# Patient Record
Sex: MALE | Race: BLACK OR AFRICAN AMERICAN | NOT HISPANIC OR LATINO | Employment: FULL TIME | URBAN - METROPOLITAN AREA
[De-identification: names, ages, dates, MRNs, and addresses within clinical notes are randomized per-mention and may not be internally consistent; named-entity substitution may affect disease eponyms.]

---

## 2022-01-01 ENCOUNTER — TELEPHONE (OUTPATIENT)
Dept: HEMATOLOGY/ONCOLOGY | Facility: CLINIC | Age: 62
End: 2022-01-01
Payer: MEDICAID

## 2022-01-01 ENCOUNTER — LAB VISIT (OUTPATIENT)
Dept: LAB | Facility: CLINIC | Age: 62
End: 2022-01-01
Payer: MEDICAID

## 2022-01-01 ENCOUNTER — OFFICE VISIT (OUTPATIENT)
Dept: HEMATOLOGY/ONCOLOGY | Facility: CLINIC | Age: 62
End: 2022-01-01
Payer: MEDICAID

## 2022-01-01 ENCOUNTER — INFUSION (OUTPATIENT)
Dept: INFUSION THERAPY | Facility: HOSPITAL | Age: 62
End: 2022-01-01
Attending: INTERNAL MEDICINE
Payer: MEDICAID

## 2022-01-01 ENCOUNTER — PATIENT MESSAGE (OUTPATIENT)
Dept: ADMINISTRATIVE | Facility: OTHER | Age: 62
End: 2022-01-01
Payer: MEDICAID

## 2022-01-01 ENCOUNTER — PATIENT MESSAGE (OUTPATIENT)
Dept: HEMATOLOGY/ONCOLOGY | Facility: CLINIC | Age: 62
End: 2022-01-01
Payer: MEDICAID

## 2022-01-01 ENCOUNTER — PATIENT MESSAGE (OUTPATIENT)
Dept: PALLIATIVE MEDICINE | Facility: CLINIC | Age: 62
End: 2022-01-01
Payer: MEDICAID

## 2022-01-01 ENCOUNTER — LAB VISIT (OUTPATIENT)
Dept: LAB | Facility: HOSPITAL | Age: 62
End: 2022-01-01
Attending: INTERNAL MEDICINE
Payer: MEDICAID

## 2022-01-01 ENCOUNTER — CLINICAL SUPPORT (OUTPATIENT)
Dept: HEMATOLOGY/ONCOLOGY | Facility: CLINIC | Age: 62
End: 2022-01-01
Payer: MEDICAID

## 2022-01-01 ENCOUNTER — OFFICE VISIT (OUTPATIENT)
Dept: PALLIATIVE MEDICINE | Facility: CLINIC | Age: 62
End: 2022-01-01
Payer: MEDICAID

## 2022-01-01 ENCOUNTER — DOCUMENTATION ONLY (OUTPATIENT)
Dept: HEMATOLOGY/ONCOLOGY | Facility: CLINIC | Age: 62
End: 2022-01-01
Payer: MEDICAID

## 2022-01-01 ENCOUNTER — LAB VISIT (OUTPATIENT)
Dept: LAB | Facility: HOSPITAL | Age: 62
End: 2022-01-01
Attending: NURSE PRACTITIONER
Payer: MEDICAID

## 2022-01-01 ENCOUNTER — TELEPHONE (OUTPATIENT)
Dept: INFUSION THERAPY | Facility: HOSPITAL | Age: 62
End: 2022-01-01
Payer: MEDICAID

## 2022-01-01 ENCOUNTER — DOCUMENTATION ONLY (OUTPATIENT)
Dept: INFUSION THERAPY | Facility: HOSPITAL | Age: 62
End: 2022-01-01
Payer: MEDICAID

## 2022-01-01 ENCOUNTER — HOSPITAL ENCOUNTER (OUTPATIENT)
Dept: RADIOLOGY | Facility: HOSPITAL | Age: 62
Discharge: HOME | End: 2022-11-18
Attending: INTERNAL MEDICINE
Payer: MEDICAID

## 2022-01-01 ENCOUNTER — TELEPHONE (OUTPATIENT)
Dept: ORTHOPEDICS | Facility: CLINIC | Age: 62
End: 2022-01-01
Payer: MEDICAID

## 2022-01-01 ENCOUNTER — DOCUMENTATION ONLY (OUTPATIENT)
Dept: INFUSION THERAPY | Facility: HOSPITAL | Age: 62
End: 2022-01-01

## 2022-01-01 ENCOUNTER — LAB VISIT (OUTPATIENT)
Dept: LAB | Facility: CLINIC | Age: 62
End: 2022-01-01
Attending: INTERNAL MEDICINE
Payer: MEDICAID

## 2022-01-01 ENCOUNTER — CONFERENCE (OUTPATIENT)
Dept: TRANSPLANT | Facility: CLINIC | Age: 62
End: 2022-01-01
Payer: MEDICAID

## 2022-01-01 ENCOUNTER — TELEPHONE (OUTPATIENT)
Dept: HEPATOLOGY | Facility: HOSPITAL | Age: 62
End: 2022-01-01
Payer: MEDICAID

## 2022-01-01 ENCOUNTER — SPECIALTY PHARMACY (OUTPATIENT)
Dept: PHARMACY | Facility: CLINIC | Age: 62
End: 2022-01-01
Payer: MEDICAID

## 2022-01-01 ENCOUNTER — HOSPITAL ENCOUNTER (OUTPATIENT)
Facility: HOSPITAL | Age: 62
Discharge: HOME | End: 2022-06-23
Attending: HOSPITALIST | Admitting: HOSPITALIST
Payer: MEDICAID

## 2022-01-01 ENCOUNTER — HOSPITAL ENCOUNTER (OUTPATIENT)
Dept: RADIOLOGY | Facility: HOSPITAL | Age: 62
Discharge: HOME | End: 2022-09-21
Attending: INTERNAL MEDICINE
Payer: MEDICAID

## 2022-01-01 VITALS
TEMPERATURE: 98 F | BODY MASS INDEX: 25.11 KG/M2 | WEIGHT: 165.13 LBS | RESPIRATION RATE: 16 BRPM | SYSTOLIC BLOOD PRESSURE: 117 MMHG | HEART RATE: 55 BPM | DIASTOLIC BLOOD PRESSURE: 74 MMHG

## 2022-01-01 VITALS
BODY MASS INDEX: 23.76 KG/M2 | RESPIRATION RATE: 18 BRPM | TEMPERATURE: 97 F | HEART RATE: 76 BPM | DIASTOLIC BLOOD PRESSURE: 87 MMHG | SYSTOLIC BLOOD PRESSURE: 129 MMHG | WEIGHT: 156.75 LBS | HEIGHT: 68 IN

## 2022-01-01 VITALS
HEIGHT: 68 IN | DIASTOLIC BLOOD PRESSURE: 70 MMHG | TEMPERATURE: 97 F | SYSTOLIC BLOOD PRESSURE: 115 MMHG | HEART RATE: 57 BPM | OXYGEN SATURATION: 97 % | RESPIRATION RATE: 18 BRPM | BODY MASS INDEX: 23.45 KG/M2 | WEIGHT: 154.75 LBS

## 2022-01-01 VITALS
DIASTOLIC BLOOD PRESSURE: 75 MMHG | RESPIRATION RATE: 16 BRPM | TEMPERATURE: 98 F | HEART RATE: 78 BPM | SYSTOLIC BLOOD PRESSURE: 107 MMHG | WEIGHT: 166.13 LBS | BODY MASS INDEX: 25.18 KG/M2 | HEIGHT: 68 IN | OXYGEN SATURATION: 97 %

## 2022-01-01 VITALS
RESPIRATION RATE: 16 BRPM | BODY MASS INDEX: 25.26 KG/M2 | SYSTOLIC BLOOD PRESSURE: 117 MMHG | HEART RATE: 69 BPM | HEIGHT: 68 IN | TEMPERATURE: 98 F | WEIGHT: 166.69 LBS | DIASTOLIC BLOOD PRESSURE: 75 MMHG

## 2022-01-01 VITALS
TEMPERATURE: 97 F | DIASTOLIC BLOOD PRESSURE: 70 MMHG | BODY MASS INDEX: 24.79 KG/M2 | HEART RATE: 68 BPM | SYSTOLIC BLOOD PRESSURE: 116 MMHG | HEIGHT: 68 IN | WEIGHT: 163.56 LBS | OXYGEN SATURATION: 100 %

## 2022-01-01 VITALS
TEMPERATURE: 98 F | HEART RATE: 62 BPM | SYSTOLIC BLOOD PRESSURE: 122 MMHG | DIASTOLIC BLOOD PRESSURE: 88 MMHG | RESPIRATION RATE: 18 BRPM | WEIGHT: 160.94 LBS | BODY MASS INDEX: 24.39 KG/M2 | HEIGHT: 68 IN

## 2022-01-01 VITALS
TEMPERATURE: 97 F | OXYGEN SATURATION: 96 % | WEIGHT: 153.88 LBS | SYSTOLIC BLOOD PRESSURE: 103 MMHG | DIASTOLIC BLOOD PRESSURE: 72 MMHG | BODY MASS INDEX: 23.32 KG/M2 | RESPIRATION RATE: 18 BRPM | HEIGHT: 68 IN | HEART RATE: 72 BPM

## 2022-01-01 VITALS
HEART RATE: 81 BPM | SYSTOLIC BLOOD PRESSURE: 129 MMHG | OXYGEN SATURATION: 98 % | BODY MASS INDEX: 23.83 KG/M2 | TEMPERATURE: 97 F | RESPIRATION RATE: 18 BRPM | DIASTOLIC BLOOD PRESSURE: 91 MMHG | WEIGHT: 156.75 LBS

## 2022-01-01 VITALS
HEART RATE: 72 BPM | TEMPERATURE: 97 F | WEIGHT: 160.94 LBS | DIASTOLIC BLOOD PRESSURE: 88 MMHG | HEIGHT: 68 IN | SYSTOLIC BLOOD PRESSURE: 118 MMHG | BODY MASS INDEX: 24.39 KG/M2 | OXYGEN SATURATION: 98 %

## 2022-01-01 VITALS
BODY MASS INDEX: 24.89 KG/M2 | HEART RATE: 70 BPM | DIASTOLIC BLOOD PRESSURE: 70 MMHG | HEIGHT: 68 IN | WEIGHT: 164.25 LBS | TEMPERATURE: 98 F | OXYGEN SATURATION: 96 % | SYSTOLIC BLOOD PRESSURE: 102 MMHG

## 2022-01-01 VITALS
TEMPERATURE: 97 F | SYSTOLIC BLOOD PRESSURE: 98 MMHG | HEIGHT: 68 IN | WEIGHT: 154.13 LBS | BODY MASS INDEX: 23.36 KG/M2 | OXYGEN SATURATION: 98 % | DIASTOLIC BLOOD PRESSURE: 70 MMHG | HEART RATE: 96 BPM

## 2022-01-01 VITALS
DIASTOLIC BLOOD PRESSURE: 70 MMHG | HEART RATE: 76 BPM | TEMPERATURE: 98 F | WEIGHT: 166.69 LBS | OXYGEN SATURATION: 98 % | SYSTOLIC BLOOD PRESSURE: 106 MMHG | HEIGHT: 68 IN | BODY MASS INDEX: 25.26 KG/M2

## 2022-01-01 VITALS
RESPIRATION RATE: 18 BRPM | SYSTOLIC BLOOD PRESSURE: 98 MMHG | HEIGHT: 68 IN | HEART RATE: 80 BPM | OXYGEN SATURATION: 96 % | WEIGHT: 159.63 LBS | TEMPERATURE: 97 F | DIASTOLIC BLOOD PRESSURE: 70 MMHG | BODY MASS INDEX: 24.19 KG/M2

## 2022-01-01 VITALS
HEIGHT: 68 IN | HEART RATE: 81 BPM | BODY MASS INDEX: 25.13 KG/M2 | WEIGHT: 165.81 LBS | OXYGEN SATURATION: 98 % | DIASTOLIC BLOOD PRESSURE: 84 MMHG | TEMPERATURE: 98 F | SYSTOLIC BLOOD PRESSURE: 120 MMHG

## 2022-01-01 VITALS
RESPIRATION RATE: 16 BRPM | TEMPERATURE: 97 F | BODY MASS INDEX: 23.45 KG/M2 | DIASTOLIC BLOOD PRESSURE: 70 MMHG | HEIGHT: 68 IN | WEIGHT: 154.75 LBS | OXYGEN SATURATION: 97 % | HEART RATE: 56 BPM | SYSTOLIC BLOOD PRESSURE: 125 MMHG

## 2022-01-01 VITALS
HEIGHT: 68 IN | WEIGHT: 153.88 LBS | DIASTOLIC BLOOD PRESSURE: 64 MMHG | HEART RATE: 66 BPM | TEMPERATURE: 97 F | BODY MASS INDEX: 23.32 KG/M2 | SYSTOLIC BLOOD PRESSURE: 110 MMHG | RESPIRATION RATE: 18 BRPM | OXYGEN SATURATION: 96 %

## 2022-01-01 VITALS
RESPIRATION RATE: 18 BRPM | DIASTOLIC BLOOD PRESSURE: 84 MMHG | OXYGEN SATURATION: 96 % | BODY MASS INDEX: 24.71 KG/M2 | HEIGHT: 68 IN | WEIGHT: 163 LBS | HEART RATE: 89 BPM | TEMPERATURE: 99 F | SYSTOLIC BLOOD PRESSURE: 125 MMHG

## 2022-01-01 DIAGNOSIS — M54.50 CHRONIC LEFT-SIDED LOW BACK PAIN WITHOUT SCIATICA: ICD-10-CM

## 2022-01-01 DIAGNOSIS — C22.0 HCC (HEPATOCELLULAR CARCINOMA): ICD-10-CM

## 2022-01-01 DIAGNOSIS — G89.3 CANCER RELATED PAIN: ICD-10-CM

## 2022-01-01 DIAGNOSIS — I10 HYPERTENSION, UNSPECIFIED TYPE: ICD-10-CM

## 2022-01-01 DIAGNOSIS — D69.6 THROMBOCYTOPENIA: ICD-10-CM

## 2022-01-01 DIAGNOSIS — R22.2 CHEST MASS: ICD-10-CM

## 2022-01-01 DIAGNOSIS — D64.9 NORMOCYTIC ANEMIA: ICD-10-CM

## 2022-01-01 DIAGNOSIS — B19.20 HEPATITIS C VIRUS INFECTION WITHOUT HEPATIC COMA, UNSPECIFIED CHRONICITY: ICD-10-CM

## 2022-01-01 DIAGNOSIS — R07.9 CHEST PAIN: ICD-10-CM

## 2022-01-01 DIAGNOSIS — G47.00 INSOMNIA, UNSPECIFIED TYPE: ICD-10-CM

## 2022-01-01 DIAGNOSIS — C22.0 HCC (HEPATOCELLULAR CARCINOMA): Primary | ICD-10-CM

## 2022-01-01 DIAGNOSIS — R16.0 LIVER MASS: Primary | ICD-10-CM

## 2022-01-01 DIAGNOSIS — K76.82 HEPATIC ENCEPHALOPATHY: ICD-10-CM

## 2022-01-01 DIAGNOSIS — R07.9 CHEST PAIN, UNSPECIFIED TYPE: ICD-10-CM

## 2022-01-01 DIAGNOSIS — R04.0 BLEEDING FROM THE NOSE: ICD-10-CM

## 2022-01-01 DIAGNOSIS — K76.82 HEPATIC ENCEPHALOPATHY: Primary | ICD-10-CM

## 2022-01-01 DIAGNOSIS — G89.29 CHRONIC LEFT-SIDED LOW BACK PAIN WITHOUT SCIATICA: ICD-10-CM

## 2022-01-01 DIAGNOSIS — I82.220: ICD-10-CM

## 2022-01-01 DIAGNOSIS — R25.1 TREMORS OF NERVOUS SYSTEM: ICD-10-CM

## 2022-01-01 DIAGNOSIS — M79.18 PIRIFORMIS MUSCLE PAIN: ICD-10-CM

## 2022-01-01 DIAGNOSIS — J45.20 MILD INTERMITTENT REACTIVE AIRWAY DISEASE WITHOUT COMPLICATION: ICD-10-CM

## 2022-01-01 DIAGNOSIS — Z71.89 ACP (ADVANCE CARE PLANNING): ICD-10-CM

## 2022-01-01 LAB
A1AT SERPL-MCNC: 200 MG/DL (ref 100–190)
AFP SERPL-MCNC: 8114 NG/ML (ref 0–8.4)
AFP SERPL-MCNC: 9243 NG/ML (ref 0–8.4)
AFP SERPL-MCNC: ABNORMAL NG/ML (ref 0–8.4)
AFP SERPL-MCNC: ABNORMAL NG/ML (ref 0–8.4)
ALBUMIN SERPL BCP-MCNC: 2.5 G/DL (ref 3.5–5.2)
ALBUMIN SERPL BCP-MCNC: 2.8 G/DL (ref 3.5–5.2)
ALBUMIN SERPL BCP-MCNC: 3 G/DL (ref 3.5–5.2)
ALBUMIN SERPL BCP-MCNC: 3.1 G/DL (ref 3.5–5.2)
ALBUMIN SERPL BCP-MCNC: 3.2 G/DL (ref 3.5–5.2)
ALBUMIN SERPL BCP-MCNC: 3.2 G/DL (ref 3.5–5.2)
ALBUMIN SERPL BCP-MCNC: 3.3 G/DL (ref 3.5–5.2)
ALBUMIN SERPL BCP-MCNC: 3.3 G/DL (ref 3.5–5.2)
ALP SERPL-CCNC: 105 U/L (ref 55–135)
ALP SERPL-CCNC: 119 U/L (ref 55–135)
ALP SERPL-CCNC: 120 U/L (ref 55–135)
ALP SERPL-CCNC: 120 U/L (ref 55–135)
ALP SERPL-CCNC: 124 U/L (ref 55–135)
ALP SERPL-CCNC: 127 U/L (ref 55–135)
ALP SERPL-CCNC: 131 U/L (ref 55–135)
ALP SERPL-CCNC: 131 U/L (ref 55–135)
ALP SERPL-CCNC: 192 U/L (ref 55–135)
ALP SERPL-CCNC: 82 U/L (ref 55–135)
ALP SERPL-CCNC: 82 U/L (ref 55–135)
ALT SERPL W/O P-5'-P-CCNC: 14 U/L (ref 10–44)
ALT SERPL W/O P-5'-P-CCNC: 18 U/L (ref 10–44)
ALT SERPL W/O P-5'-P-CCNC: 19 U/L (ref 10–44)
ALT SERPL W/O P-5'-P-CCNC: 31 U/L (ref 10–44)
ALT SERPL W/O P-5'-P-CCNC: 32 U/L (ref 10–44)
ALT SERPL W/O P-5'-P-CCNC: 35 U/L (ref 10–44)
ALT SERPL W/O P-5'-P-CCNC: 39 U/L (ref 10–44)
ALT SERPL W/O P-5'-P-CCNC: 49 U/L (ref 10–44)
ALT SERPL W/O P-5'-P-CCNC: 61 U/L (ref 10–44)
ALT SERPL W/O P-5'-P-CCNC: 64 U/L (ref 10–44)
ALT SERPL W/O P-5'-P-CCNC: 88 U/L (ref 10–44)
ANA SER QL IF: NORMAL
ANION GAP SERPL CALC-SCNC: 6 MMOL/L (ref 8–16)
ANION GAP SERPL CALC-SCNC: 7 MMOL/L (ref 8–16)
ANION GAP SERPL CALC-SCNC: 8 MMOL/L (ref 8–16)
ANION GAP SERPL CALC-SCNC: 9 MMOL/L (ref 8–16)
ASCENDING AORTA: 3 CM
AST SERPL-CCNC: 110 U/L (ref 10–40)
AST SERPL-CCNC: 111 U/L (ref 10–40)
AST SERPL-CCNC: 128 U/L (ref 10–40)
AST SERPL-CCNC: 143 U/L (ref 10–40)
AST SERPL-CCNC: 33 U/L (ref 10–40)
AST SERPL-CCNC: 33 U/L (ref 10–40)
AST SERPL-CCNC: 43 U/L (ref 10–40)
AST SERPL-CCNC: 74 U/L (ref 10–40)
AST SERPL-CCNC: 74 U/L (ref 10–40)
AST SERPL-CCNC: 83 U/L (ref 10–40)
AST SERPL-CCNC: 90 U/L (ref 10–40)
AV INDEX (PROSTH): 0.68
AV MEAN GRADIENT: 3 MMHG
AV PEAK GRADIENT: 4 MMHG
AV VALVE AREA: 2.36 CM2
AV VELOCITY RATIO: 0.81
BASOPHILS # BLD AUTO: 0.02 K/UL (ref 0–0.2)
BASOPHILS # BLD AUTO: 0.05 K/UL (ref 0–0.2)
BASOPHILS # BLD AUTO: 0.06 K/UL (ref 0–0.2)
BASOPHILS # BLD AUTO: 0.07 K/UL (ref 0–0.2)
BASOPHILS # BLD AUTO: 0.07 K/UL (ref 0–0.2)
BASOPHILS # BLD AUTO: 0.08 K/UL (ref 0–0.2)
BASOPHILS NFR BLD: 0.6 % (ref 0–1.9)
BASOPHILS NFR BLD: 0.8 % (ref 0–1.9)
BASOPHILS NFR BLD: 1.1 % (ref 0–1.9)
BASOPHILS NFR BLD: 1.4 % (ref 0–1.9)
BASOPHILS NFR BLD: 1.4 % (ref 0–1.9)
BASOPHILS NFR BLD: 1.5 % (ref 0–1.9)
BASOPHILS NFR BLD: 1.5 % (ref 0–1.9)
BASOPHILS NFR BLD: 1.6 % (ref 0–1.9)
BILIRUB SERPL-MCNC: 1 MG/DL (ref 0.1–1)
BILIRUB SERPL-MCNC: 1 MG/DL (ref 0.1–1)
BILIRUB SERPL-MCNC: 1.2 MG/DL (ref 0.1–1)
BILIRUB SERPL-MCNC: 1.3 MG/DL (ref 0.1–1)
BILIRUB SERPL-MCNC: 1.8 MG/DL (ref 0.1–1)
BILIRUB SERPL-MCNC: 2 MG/DL (ref 0.1–1)
BILIRUB SERPL-MCNC: 2.4 MG/DL (ref 0.1–1)
BILIRUB SERPL-MCNC: 2.5 MG/DL (ref 0.1–1)
BILIRUB SERPL-MCNC: 2.7 MG/DL (ref 0.1–1)
BILIRUB SERPL-MCNC: 2.7 MG/DL (ref 0.1–1)
BILIRUB SERPL-MCNC: 3.5 MG/DL (ref 0.1–1)
BSA FOR ECHO PROCEDURE: 1.88 M2
BUN SERPL-MCNC: 13 MG/DL (ref 8–23)
BUN SERPL-MCNC: 13 MG/DL (ref 8–23)
BUN SERPL-MCNC: 14 MG/DL (ref 8–23)
BUN SERPL-MCNC: 15 MG/DL (ref 8–23)
BUN SERPL-MCNC: 16 MG/DL (ref 8–23)
BUN SERPL-MCNC: 17 MG/DL (ref 8–23)
BUN SERPL-MCNC: 23 MG/DL (ref 8–23)
CALCIUM SERPL-MCNC: 8.3 MG/DL (ref 8.7–10.5)
CALCIUM SERPL-MCNC: 8.4 MG/DL (ref 8.7–10.5)
CALCIUM SERPL-MCNC: 8.4 MG/DL (ref 8.7–10.5)
CALCIUM SERPL-MCNC: 8.6 MG/DL (ref 8.7–10.5)
CALCIUM SERPL-MCNC: 9 MG/DL (ref 8.7–10.5)
CALCIUM SERPL-MCNC: 9.2 MG/DL (ref 8.7–10.5)
CALCIUM SERPL-MCNC: 9.2 MG/DL (ref 8.7–10.5)
CALCIUM SERPL-MCNC: 9.4 MG/DL (ref 8.7–10.5)
CALCIUM SERPL-MCNC: 9.4 MG/DL (ref 8.7–10.5)
CALCIUM SERPL-MCNC: 9.5 MG/DL (ref 8.7–10.5)
CALCIUM SERPL-MCNC: 9.7 MG/DL (ref 8.7–10.5)
CANCER AG19-9 SERPL-ACNC: <2.1 U/ML (ref 0–40)
CEA SERPL-MCNC: 11.9 NG/ML (ref 0–5)
CERULOPLASMIN SERPL-MCNC: 36 MG/DL (ref 15–45)
CHLORIDE SERPL-SCNC: 104 MMOL/L (ref 95–110)
CHLORIDE SERPL-SCNC: 105 MMOL/L (ref 95–110)
CHLORIDE SERPL-SCNC: 105 MMOL/L (ref 95–110)
CHLORIDE SERPL-SCNC: 106 MMOL/L (ref 95–110)
CHLORIDE SERPL-SCNC: 107 MMOL/L (ref 95–110)
CHLORIDE SERPL-SCNC: 108 MMOL/L (ref 95–110)
CO2 SERPL-SCNC: 18 MMOL/L (ref 23–29)
CO2 SERPL-SCNC: 18 MMOL/L (ref 23–29)
CO2 SERPL-SCNC: 20 MMOL/L (ref 23–29)
CO2 SERPL-SCNC: 21 MMOL/L (ref 23–29)
CO2 SERPL-SCNC: 22 MMOL/L (ref 23–29)
CO2 SERPL-SCNC: 23 MMOL/L (ref 23–29)
CO2 SERPL-SCNC: 24 MMOL/L (ref 23–29)
CREAT SERPL-MCNC: 0.8 MG/DL (ref 0.5–1.4)
CREAT SERPL-MCNC: 0.9 MG/DL (ref 0.5–1.4)
CREAT SERPL-MCNC: 1 MG/DL (ref 0.5–1.4)
CREAT SERPL-MCNC: 1.1 MG/DL (ref 0.5–1.4)
CREAT SERPL-MCNC: 1.3 MG/DL (ref 0.5–1.4)
CV ECHO LV RWT: 0.52 CM
DIFFERENTIAL METHOD: ABNORMAL
DOP CALC AO PEAK VEL: 1.04 M/S
DOP CALC AO VTI: 18.19 CM
DOP CALC LVOT AREA: 3.5 CM2
DOP CALC LVOT DIAMETER: 2.1 CM
DOP CALC LVOT PEAK VEL: 0.84 M/S
DOP CALC LVOT STROKE VOLUME: 43 CM3
DOP CALCLVOT PEAK VEL VTI: 12.42 CM
E WAVE DECELERATION TIME: 269.86 MSEC
E/A RATIO: 0.79
E/E' RATIO: 5.07 M/S
ECHO LV POSTERIOR WALL: 1.04 CM (ref 0.6–1.1)
EJECTION FRACTION: 58 %
EOSINOPHIL # BLD AUTO: 0.1 K/UL (ref 0–0.5)
EOSINOPHIL # BLD AUTO: 0.2 K/UL (ref 0–0.5)
EOSINOPHIL # BLD AUTO: 0.3 K/UL (ref 0–0.5)
EOSINOPHIL NFR BLD: 1.9 % (ref 0–8)
EOSINOPHIL NFR BLD: 2.7 % (ref 0–8)
EOSINOPHIL NFR BLD: 3 % (ref 0–8)
EOSINOPHIL NFR BLD: 3.7 % (ref 0–8)
EOSINOPHIL NFR BLD: 3.7 % (ref 0–8)
EOSINOPHIL NFR BLD: 4.3 % (ref 0–8)
EOSINOPHIL NFR BLD: 4.7 % (ref 0–8)
EOSINOPHIL NFR BLD: 5.3 % (ref 0–8)
ERYTHROCYTE [DISTWIDTH] IN BLOOD BY AUTOMATED COUNT: 13.2 % (ref 11.5–14.5)
ERYTHROCYTE [DISTWIDTH] IN BLOOD BY AUTOMATED COUNT: 13.2 % (ref 11.5–14.5)
ERYTHROCYTE [DISTWIDTH] IN BLOOD BY AUTOMATED COUNT: 14.3 % (ref 11.5–14.5)
ERYTHROCYTE [DISTWIDTH] IN BLOOD BY AUTOMATED COUNT: 14.5 % (ref 11.5–14.5)
ERYTHROCYTE [DISTWIDTH] IN BLOOD BY AUTOMATED COUNT: 14.5 % (ref 11.5–14.5)
ERYTHROCYTE [DISTWIDTH] IN BLOOD BY AUTOMATED COUNT: 14.6 % (ref 11.5–14.5)
ERYTHROCYTE [DISTWIDTH] IN BLOOD BY AUTOMATED COUNT: 14.7 % (ref 11.5–14.5)
ERYTHROCYTE [DISTWIDTH] IN BLOOD BY AUTOMATED COUNT: 15 % (ref 11.5–14.5)
ERYTHROCYTE [DISTWIDTH] IN BLOOD BY AUTOMATED COUNT: 15 % (ref 11.5–14.5)
ERYTHROCYTE [DISTWIDTH] IN BLOOD BY AUTOMATED COUNT: 15.9 % (ref 11.5–14.5)
ERYTHROCYTE [DISTWIDTH] IN BLOOD BY AUTOMATED COUNT: 16.3 % (ref 11.5–14.5)
EST. GFR  (AFRICAN AMERICAN): >60 ML/MIN/1.73 M^2
EST. GFR  (NO RACE VARIABLE): >60 ML/MIN/1.73 M^2
EST. GFR  (NON AFRICAN AMERICAN): >60 ML/MIN/1.73 M^2
FERRITIN SERPL-MCNC: 979 NG/ML (ref 20–300)
FRACTIONAL SHORTENING: 25 % (ref 28–44)
GLUCOSE SERPL-MCNC: 67 MG/DL (ref 70–110)
GLUCOSE SERPL-MCNC: 78 MG/DL (ref 70–110)
GLUCOSE SERPL-MCNC: 81 MG/DL (ref 70–110)
GLUCOSE SERPL-MCNC: 82 MG/DL (ref 70–110)
GLUCOSE SERPL-MCNC: 84 MG/DL (ref 70–110)
GLUCOSE SERPL-MCNC: 85 MG/DL (ref 70–110)
GLUCOSE SERPL-MCNC: 85 MG/DL (ref 70–110)
GLUCOSE SERPL-MCNC: 86 MG/DL (ref 70–110)
GLUCOSE SERPL-MCNC: 89 MG/DL (ref 70–110)
GLUCOSE SERPL-MCNC: 90 MG/DL (ref 70–110)
GLUCOSE SERPL-MCNC: 91 MG/DL (ref 70–110)
HAPTOGLOB SERPL-MCNC: 59 MG/DL (ref 30–250)
HAV IGM SERPL QL IA: NEGATIVE
HBV CORE IGM SERPL QL IA: NEGATIVE
HBV SURFACE AG SERPL QL IA: NEGATIVE
HCT VFR BLD AUTO: 23.6 % (ref 40–54)
HCT VFR BLD AUTO: 24.9 % (ref 40–54)
HCT VFR BLD AUTO: 25.2 % (ref 40–54)
HCT VFR BLD AUTO: 25.5 % (ref 40–54)
HCT VFR BLD AUTO: 25.8 % (ref 40–54)
HCT VFR BLD AUTO: 27 % (ref 40–54)
HCT VFR BLD AUTO: 27.6 % (ref 40–54)
HCT VFR BLD AUTO: 28 % (ref 40–54)
HCT VFR BLD AUTO: 28.9 % (ref 40–54)
HCT VFR BLD AUTO: 28.9 % (ref 40–54)
HCT VFR BLD AUTO: 31.9 % (ref 40–54)
HCT VFR BLD AUTO: 35.2 % (ref 40–54)
HCT VFR BLD AUTO: 37.5 % (ref 40–54)
HCV AB SERPL QL IA: POSITIVE
HCV RNA SERPL NAA+PROBE-ACNC: ABNORMAL IU/ML
HGB BLD-MCNC: 11 G/DL (ref 14–18)
HGB BLD-MCNC: 12.3 G/DL (ref 14–18)
HGB BLD-MCNC: 8.2 G/DL (ref 14–18)
HGB BLD-MCNC: 8.3 G/DL (ref 14–18)
HGB BLD-MCNC: 8.4 G/DL (ref 14–18)
HGB BLD-MCNC: 8.5 G/DL (ref 14–18)
HGB BLD-MCNC: 8.6 G/DL (ref 14–18)
HGB BLD-MCNC: 8.8 G/DL (ref 14–18)
HGB BLD-MCNC: 9.1 G/DL (ref 14–18)
HGB BLD-MCNC: 9.2 G/DL (ref 14–18)
HGB BLD-MCNC: 9.6 G/DL (ref 14–18)
HGB BLD-MCNC: 9.6 G/DL (ref 14–18)
HGB BLD-MCNC: 9.7 G/DL (ref 14–18)
IGG SERPL-MCNC: 2730 MG/DL (ref 650–1600)
IMM GRANULOCYTES # BLD AUTO: 0 K/UL (ref 0–0.04)
IMM GRANULOCYTES # BLD AUTO: 0.01 K/UL (ref 0–0.04)
IMM GRANULOCYTES # BLD AUTO: 0.02 K/UL (ref 0–0.04)
IMM GRANULOCYTES # BLD AUTO: 0.02 K/UL (ref 0–0.04)
IMM GRANULOCYTES # BLD AUTO: 0.03 K/UL (ref 0–0.04)
IMM GRANULOCYTES NFR BLD AUTO: 0 % (ref 0–0.5)
IMM GRANULOCYTES NFR BLD AUTO: 0.2 % (ref 0–0.5)
IMM GRANULOCYTES NFR BLD AUTO: 0.3 % (ref 0–0.5)
IMM GRANULOCYTES NFR BLD AUTO: 0.4 % (ref 0–0.5)
IMM GRANULOCYTES NFR BLD AUTO: 0.5 % (ref 0–0.5)
IMM GRANULOCYTES NFR BLD AUTO: 0.6 % (ref 0–0.5)
INR PPP: 1.1 (ref 0.8–1.2)
INR PPP: 1.2 (ref 0.8–1.2)
INR PPP: 1.2 (ref 0.8–1.2)
INR PPP: 1.3 (ref 0.8–1.2)
INR PPP: 1.4 (ref 0.8–1.2)
INTERVENTRICULAR SEPTUM: 1.22 CM (ref 0.6–1.1)
IRON SERPL-MCNC: 57 UG/DL (ref 45–160)
IVRT: 138.92 MSEC
LA MAJOR: 5.99 CM
LA MINOR: 6.05 CM
LA WIDTH: 3.25 CM
LDH SERPL L TO P-CCNC: 367 U/L (ref 110–260)
LEFT ATRIUM SIZE: 3.45 CM
LEFT ATRIUM VOLUME INDEX MOD: 20.6 ML/M2
LEFT ATRIUM VOLUME INDEX: 30.7 ML/M2
LEFT ATRIUM VOLUME MOD: 38.59 CM3
LEFT ATRIUM VOLUME: 57.37 CM3
LEFT INTERNAL DIMENSION IN SYSTOLE: 3 CM (ref 2.1–4)
LEFT VENTRICLE DIASTOLIC VOLUME INDEX: 37.71 ML/M2
LEFT VENTRICLE DIASTOLIC VOLUME: 70.51 ML
LEFT VENTRICLE MASS INDEX: 81 G/M2
LEFT VENTRICLE SYSTOLIC VOLUME INDEX: 18.8 ML/M2
LEFT VENTRICLE SYSTOLIC VOLUME: 35.09 ML
LEFT VENTRICULAR INTERNAL DIMENSION IN DIASTOLE: 4.01 CM (ref 3.5–6)
LEFT VENTRICULAR MASS: 152.03 G
LV LATERAL E/E' RATIO: 3.8 M/S
LV SEPTAL E/E' RATIO: 7.6 M/S
LYMPHOCYTES # BLD AUTO: 0.9 K/UL (ref 1–4.8)
LYMPHOCYTES # BLD AUTO: 1.3 K/UL (ref 1–4.8)
LYMPHOCYTES # BLD AUTO: 1.7 K/UL (ref 1–4.8)
LYMPHOCYTES # BLD AUTO: 1.8 K/UL (ref 1–4.8)
LYMPHOCYTES # BLD AUTO: 1.8 K/UL (ref 1–4.8)
LYMPHOCYTES # BLD AUTO: 2.2 K/UL (ref 1–4.8)
LYMPHOCYTES NFR BLD: 26.4 % (ref 18–48)
LYMPHOCYTES NFR BLD: 27.1 % (ref 18–48)
LYMPHOCYTES NFR BLD: 36.2 % (ref 18–48)
LYMPHOCYTES NFR BLD: 36.2 % (ref 18–48)
LYMPHOCYTES NFR BLD: 37.2 % (ref 18–48)
LYMPHOCYTES NFR BLD: 39.3 % (ref 18–48)
LYMPHOCYTES NFR BLD: 40 % (ref 18–48)
LYMPHOCYTES NFR BLD: 41.5 % (ref 18–48)
MAGNESIUM SERPL-MCNC: 1.5 MG/DL (ref 1.6–2.6)
MAGNESIUM SERPL-MCNC: 1.6 MG/DL (ref 1.6–2.6)
MCH RBC QN AUTO: 29.3 PG (ref 27–31)
MCH RBC QN AUTO: 29.3 PG (ref 27–31)
MCH RBC QN AUTO: 29.6 PG (ref 27–31)
MCH RBC QN AUTO: 29.8 PG (ref 27–31)
MCH RBC QN AUTO: 30 PG (ref 27–31)
MCH RBC QN AUTO: 30.1 PG (ref 27–31)
MCH RBC QN AUTO: 30.2 PG (ref 27–31)
MCH RBC QN AUTO: 30.4 PG (ref 27–31)
MCH RBC QN AUTO: 31 PG (ref 27–31)
MCH RBC QN AUTO: 31.1 PG (ref 27–31)
MCH RBC QN AUTO: 32.5 PG (ref 27–31)
MCH RBC QN AUTO: 33.4 PG (ref 27–31)
MCH RBC QN AUTO: 33.6 PG (ref 27–31)
MCHC RBC AUTO-ENTMCNC: 30.4 G/DL (ref 32–36)
MCHC RBC AUTO-ENTMCNC: 31.3 G/DL (ref 32–36)
MCHC RBC AUTO-ENTMCNC: 31.5 G/DL (ref 32–36)
MCHC RBC AUTO-ENTMCNC: 31.8 G/DL (ref 32–36)
MCHC RBC AUTO-ENTMCNC: 32.6 G/DL (ref 32–36)
MCHC RBC AUTO-ENTMCNC: 32.8 G/DL (ref 32–36)
MCHC RBC AUTO-ENTMCNC: 32.9 G/DL (ref 32–36)
MCHC RBC AUTO-ENTMCNC: 33.3 G/DL (ref 32–36)
MCHC RBC AUTO-ENTMCNC: 33.3 G/DL (ref 32–36)
MCHC RBC AUTO-ENTMCNC: 33.7 G/DL (ref 32–36)
MCHC RBC AUTO-ENTMCNC: 34.3 G/DL (ref 32–36)
MCHC RBC AUTO-ENTMCNC: 34.7 G/DL (ref 32–36)
MCHC RBC AUTO-ENTMCNC: 34.8 G/DL (ref 32–36)
MCV RBC AUTO: 91 FL (ref 82–98)
MCV RBC AUTO: 92 FL (ref 82–98)
MCV RBC AUTO: 93 FL (ref 82–98)
MCV RBC AUTO: 94 FL (ref 82–98)
MCV RBC AUTO: 95 FL (ref 82–98)
MCV RBC AUTO: 96 FL (ref 82–98)
MCV RBC AUTO: 98 FL (ref 82–98)
MITOCHONDRIA AB TITR SER IF: NORMAL {TITER}
MONOCYTES # BLD AUTO: 0.3 K/UL (ref 0.3–1)
MONOCYTES # BLD AUTO: 0.4 K/UL (ref 0.3–1)
MONOCYTES # BLD AUTO: 0.5 K/UL (ref 0.3–1)
MONOCYTES # BLD AUTO: 0.6 K/UL (ref 0.3–1)
MONOCYTES # BLD AUTO: 0.9 K/UL (ref 0.3–1)
MONOCYTES NFR BLD: 10.1 % (ref 4–15)
MONOCYTES NFR BLD: 11 % (ref 4–15)
MONOCYTES NFR BLD: 12.4 % (ref 4–15)
MONOCYTES NFR BLD: 12.6 % (ref 4–15)
MONOCYTES NFR BLD: 13.4 % (ref 4–15)
MONOCYTES NFR BLD: 14 % (ref 4–15)
MONOCYTES NFR BLD: 8.1 % (ref 4–15)
MONOCYTES NFR BLD: 9.9 % (ref 4–15)
MV PEAK A VEL: 0.48 M/S
MV PEAK E VEL: 0.38 M/S
MV STENOSIS PRESSURE HALF TIME: 78.26 MS
MV VALVE AREA P 1/2 METHOD: 2.81 CM2
NEUTROPHILS # BLD AUTO: 1.6 K/UL (ref 1.8–7.7)
NEUTROPHILS # BLD AUTO: 1.6 K/UL (ref 1.8–7.7)
NEUTROPHILS # BLD AUTO: 1.8 K/UL (ref 1.8–7.7)
NEUTROPHILS # BLD AUTO: 2.1 K/UL (ref 1.8–7.7)
NEUTROPHILS # BLD AUTO: 2.3 K/UL (ref 1.8–7.7)
NEUTROPHILS # BLD AUTO: 2.3 K/UL (ref 1.8–7.7)
NEUTROPHILS # BLD AUTO: 2.4 K/UL (ref 1.8–7.7)
NEUTROPHILS # BLD AUTO: 3.5 K/UL (ref 1.8–7.7)
NEUTROPHILS NFR BLD: 40.4 % (ref 38–73)
NEUTROPHILS NFR BLD: 41.6 % (ref 38–73)
NEUTROPHILS NFR BLD: 43.1 % (ref 38–73)
NEUTROPHILS NFR BLD: 45.7 % (ref 38–73)
NEUTROPHILS NFR BLD: 47.5 % (ref 38–73)
NEUTROPHILS NFR BLD: 48.7 % (ref 38–73)
NEUTROPHILS NFR BLD: 55.7 % (ref 38–73)
NEUTROPHILS NFR BLD: 60.3 % (ref 38–73)
NRBC BLD-RTO: 0 /100 WBC
PATH REV BLD -IMP: NORMAL
PATH REV BLD -IMP: NORMAL
PETH 16:0/18.1 (POPETH): 19 NG/ML
PETH 16:0/18.2 (PLPETH): 20 NG/ML
PHOSPHATE SERPL-MCNC: 2.7 MG/DL (ref 2.7–4.5)
PHOSPHATE SERPL-MCNC: 3 MG/DL (ref 2.7–4.5)
PISA TR MAX VEL: 1.91 M/S
PLATELET # BLD AUTO: 100 K/UL (ref 150–450)
PLATELET # BLD AUTO: 103 K/UL (ref 150–450)
PLATELET # BLD AUTO: 103 K/UL (ref 150–450)
PLATELET # BLD AUTO: 113 K/UL (ref 150–450)
PLATELET # BLD AUTO: 147 K/UL (ref 150–450)
PLATELET # BLD AUTO: 77 K/UL (ref 150–450)
PLATELET # BLD AUTO: 83 K/UL (ref 150–450)
PLATELET # BLD AUTO: 83 K/UL (ref 150–450)
PLATELET # BLD AUTO: 85 K/UL (ref 150–450)
PLATELET # BLD AUTO: 85 K/UL (ref 150–450)
PLATELET # BLD AUTO: 87 K/UL (ref 150–450)
PLATELET # BLD AUTO: 92 K/UL (ref 150–450)
PLATELET # BLD AUTO: 94 K/UL (ref 150–450)
PLATELET # BLD AUTO: 97 K/UL (ref 150–450)
PMV BLD AUTO: 10.2 FL (ref 9.2–12.9)
PMV BLD AUTO: 10.8 FL (ref 9.2–12.9)
PMV BLD AUTO: 10.8 FL (ref 9.2–12.9)
PMV BLD AUTO: 11.1 FL (ref 9.2–12.9)
PMV BLD AUTO: 11.2 FL (ref 9.2–12.9)
PMV BLD AUTO: 11.7 FL (ref 9.2–12.9)
PMV BLD AUTO: 11.8 FL (ref 9.2–12.9)
PMV BLD AUTO: 11.8 FL (ref 9.2–12.9)
PMV BLD AUTO: 11.9 FL (ref 9.2–12.9)
PMV BLD AUTO: 12.2 FL (ref 9.2–12.9)
PMV BLD AUTO: 12.3 FL (ref 9.2–12.9)
PMV BLD AUTO: 12.9 FL (ref 9.2–12.9)
POTASSIUM SERPL-SCNC: 3.8 MMOL/L (ref 3.5–5.1)
POTASSIUM SERPL-SCNC: 3.9 MMOL/L (ref 3.5–5.1)
POTASSIUM SERPL-SCNC: 4.1 MMOL/L (ref 3.5–5.1)
POTASSIUM SERPL-SCNC: 4.1 MMOL/L (ref 3.5–5.1)
POTASSIUM SERPL-SCNC: 4.2 MMOL/L (ref 3.5–5.1)
POTASSIUM SERPL-SCNC: 4.2 MMOL/L (ref 3.5–5.1)
POTASSIUM SERPL-SCNC: 4.6 MMOL/L (ref 3.5–5.1)
PROT SERPL-MCNC: 6.9 G/DL (ref 6–8.4)
PROT SERPL-MCNC: 7.1 G/DL (ref 6–8.4)
PROT SERPL-MCNC: 7.2 G/DL (ref 6–8.4)
PROT SERPL-MCNC: 7.2 G/DL (ref 6–8.4)
PROT SERPL-MCNC: 8.5 G/DL (ref 6–8.4)
PROT SERPL-MCNC: 8.8 G/DL (ref 6–8.4)
PROT SERPL-MCNC: 8.8 G/DL (ref 6–8.4)
PROT SERPL-MCNC: 9 G/DL (ref 6–8.4)
PROT SERPL-MCNC: 9.2 G/DL (ref 6–8.4)
PROTHROMBIN TIME: 11.1 SEC (ref 9–12.5)
PROTHROMBIN TIME: 11.3 SEC (ref 9–12.5)
PROTHROMBIN TIME: 11.4 SEC (ref 9–12.5)
PROTHROMBIN TIME: 11.7 SEC (ref 9–12.5)
PROTHROMBIN TIME: 12.4 SEC (ref 9–12.5)
PROTHROMBIN TIME: 12.6 SEC (ref 9–12.5)
PROTHROMBIN TIME: 13 SEC (ref 9–12.5)
PROTHROMBIN TIME: 14.6 SEC (ref 9–12.5)
RA MAJOR: 4.81 CM
RA PRESSURE: 15 MMHG
RA WIDTH: 2.58 CM
RBC # BLD AUTO: 2.52 M/UL (ref 4.6–6.2)
RBC # BLD AUTO: 2.71 M/UL (ref 4.6–6.2)
RBC # BLD AUTO: 2.73 M/UL (ref 4.6–6.2)
RBC # BLD AUTO: 2.75 M/UL (ref 4.6–6.2)
RBC # BLD AUTO: 2.83 M/UL (ref 4.6–6.2)
RBC # BLD AUTO: 2.86 M/UL (ref 4.6–6.2)
RBC # BLD AUTO: 2.87 M/UL (ref 4.6–6.2)
RBC # BLD AUTO: 2.97 M/UL (ref 4.6–6.2)
RBC # BLD AUTO: 3.03 M/UL (ref 4.6–6.2)
RBC # BLD AUTO: 3.14 M/UL (ref 4.6–6.2)
RBC # BLD AUTO: 3.31 M/UL (ref 4.6–6.2)
RBC # BLD AUTO: 3.66 M/UL (ref 4.6–6.2)
RBC # BLD AUTO: 4.13 M/UL (ref 4.6–6.2)
RETICS/RBC NFR AUTO: 2.2 % (ref 0.4–2)
RIGHT VENTRICULAR END-DIASTOLIC DIMENSION: 3.46 CM
RV TISSUE DOPPLER FREE WALL SYSTOLIC VELOCITY 1 (APICAL 4 CHAMBER VIEW): 8.49 CM/S
SATURATED IRON: 19 % (ref 20–50)
SINUS: 3.07 CM
SMOOTH MUSCLE AB TITR SER IF: ABNORMAL {TITER}
SODIUM SERPL-SCNC: 132 MMOL/L (ref 136–145)
SODIUM SERPL-SCNC: 133 MMOL/L (ref 136–145)
SODIUM SERPL-SCNC: 134 MMOL/L (ref 136–145)
SODIUM SERPL-SCNC: 135 MMOL/L (ref 136–145)
SODIUM SERPL-SCNC: 136 MMOL/L (ref 136–145)
SODIUM SERPL-SCNC: 137 MMOL/L (ref 136–145)
SODIUM SERPL-SCNC: 138 MMOL/L (ref 136–145)
STJ: 2.54 CM
TDI LATERAL: 0.1 M/S
TDI SEPTAL: 0.05 M/S
TDI: 0.08 M/S
TOTAL IRON BINDING CAPACITY: 306 UG/DL (ref 250–450)
TR MAX PG: 15 MMHG
TRANSFERRIN SERPL-MCNC: 207 MG/DL (ref 200–375)
TRICUSPID ANNULAR PLANE SYSTOLIC EXCURSION: 1.19 CM
TSH SERPL DL<=0.005 MIU/L-ACNC: 0.5 UIU/ML (ref 0.4–4)
TSH SERPL DL<=0.005 MIU/L-ACNC: 0.75 UIU/ML (ref 0.4–4)
TSH SERPL DL<=0.005 MIU/L-ACNC: 0.92 UIU/ML (ref 0.4–4)
TSH SERPL DL<=0.005 MIU/L-ACNC: 1.02 UIU/ML (ref 0.4–4)
TSH SERPL DL<=0.005 MIU/L-ACNC: 1.17 UIU/ML (ref 0.4–4)
TSH SERPL DL<=0.005 MIU/L-ACNC: 2.08 UIU/ML (ref 0.4–4)
TSH SERPL DL<=0.005 MIU/L-ACNC: 2.46 UIU/ML (ref 0.4–4)
TV REST PULMONARY ARTERY PRESSURE: 30 MMHG
WBC # BLD AUTO: 3.45 K/UL (ref 3.9–12.7)
WBC # BLD AUTO: 3.45 K/UL (ref 3.9–12.7)
WBC # BLD AUTO: 3.54 K/UL (ref 3.9–12.7)
WBC # BLD AUTO: 3.9 K/UL (ref 3.9–12.7)
WBC # BLD AUTO: 3.95 K/UL (ref 3.9–12.7)
WBC # BLD AUTO: 3.98 K/UL (ref 3.9–12.7)
WBC # BLD AUTO: 4.02 K/UL (ref 3.9–12.7)
WBC # BLD AUTO: 4.3 K/UL (ref 3.9–12.7)
WBC # BLD AUTO: 4.35 K/UL (ref 3.9–12.7)
WBC # BLD AUTO: 4.73 K/UL (ref 3.9–12.7)
WBC # BLD AUTO: 4.92 K/UL (ref 3.9–12.7)
WBC # BLD AUTO: 5.45 K/UL (ref 3.9–12.7)
WBC # BLD AUTO: 6.3 K/UL (ref 3.9–12.7)

## 2022-01-01 PROCEDURE — 36415 COLL VENOUS BLD VENIPUNCTURE: CPT | Mod: PN | Performed by: INTERNAL MEDICINE

## 2022-01-01 PROCEDURE — 99214 OFFICE O/P EST MOD 30 MIN: CPT | Mod: PBBFAC,PN | Performed by: NURSE PRACTITIONER

## 2022-01-01 PROCEDURE — 85060 PATHOLOGIST REVIEW: ICD-10-PCS | Mod: ,,, | Performed by: PATHOLOGY

## 2022-01-01 PROCEDURE — 3074F SYST BP LT 130 MM HG: CPT | Mod: CPTII,,, | Performed by: INTERNAL MEDICINE

## 2022-01-01 PROCEDURE — 99999 PR PBB SHADOW E&M-EST. PATIENT-LVL I: ICD-10-PCS | Mod: PBBFAC,,, | Performed by: NURSE PRACTITIONER

## 2022-01-01 PROCEDURE — 85025 COMPLETE CBC W/AUTO DIFF WBC: CPT | Mod: PN | Performed by: INTERNAL MEDICINE

## 2022-01-01 PROCEDURE — 82728 ASSAY OF FERRITIN: CPT | Performed by: INTERNAL MEDICINE

## 2022-01-01 PROCEDURE — 86256 FLUORESCENT ANTIBODY TITER: CPT | Performed by: INTERNAL MEDICINE

## 2022-01-01 PROCEDURE — 99999 PR PBB SHADOW E&M-EST. PATIENT-LVL III: ICD-10-PCS | Mod: PBBFAC,,, | Performed by: INTERNAL MEDICINE

## 2022-01-01 PROCEDURE — 82105 ALPHA-FETOPROTEIN SERUM: CPT | Performed by: INTERNAL MEDICINE

## 2022-01-01 PROCEDURE — 3079F DIAST BP 80-89 MM HG: CPT | Mod: CPTII,,, | Performed by: INTERNAL MEDICINE

## 2022-01-01 PROCEDURE — 99215 OFFICE O/P EST HI 40 MIN: CPT | Mod: S$PBB,,, | Performed by: INTERNAL MEDICINE

## 2022-01-01 PROCEDURE — 85025 COMPLETE CBC W/AUTO DIFF WBC: CPT | Performed by: INTERNAL MEDICINE

## 2022-01-01 PROCEDURE — 99215 PR OFFICE/OUTPT VISIT, EST, LEVL V, 40-54 MIN: ICD-10-PCS | Mod: S$PBB,,, | Performed by: INTERNAL MEDICINE

## 2022-01-01 PROCEDURE — 1159F MED LIST DOCD IN RCRD: CPT | Mod: CPTII,,, | Performed by: INTERNAL MEDICINE

## 2022-01-01 PROCEDURE — 3008F BODY MASS INDEX DOCD: CPT | Mod: CPTII,,, | Performed by: INTERNAL MEDICINE

## 2022-01-01 PROCEDURE — 1159F PR MEDICATION LIST DOCUMENTED IN MEDICAL RECORD: ICD-10-PCS | Mod: CPTII,,, | Performed by: INTERNAL MEDICINE

## 2022-01-01 PROCEDURE — 25000003 PHARM REV CODE 250: Performed by: INTERNAL MEDICINE

## 2022-01-01 PROCEDURE — G0378 HOSPITAL OBSERVATION PER HR: HCPCS

## 2022-01-01 PROCEDURE — 85610 PROTHROMBIN TIME: CPT | Performed by: INTERNAL MEDICINE

## 2022-01-01 PROCEDURE — 96413 CHEMO IV INFUSION 1 HR: CPT | Mod: PN

## 2022-01-01 PROCEDURE — 36415 COLL VENOUS BLD VENIPUNCTURE: CPT | Performed by: INTERNAL MEDICINE

## 2022-01-01 PROCEDURE — 90677 PCV20 VACCINE IM: CPT | Performed by: STUDENT IN AN ORGANIZED HEALTH CARE EDUCATION/TRAINING PROGRAM

## 2022-01-01 PROCEDURE — 71260 CT THORAX DX C+: CPT | Mod: 26,,, | Performed by: RADIOLOGY

## 2022-01-01 PROCEDURE — 80074 ACUTE HEPATITIS PANEL: CPT | Performed by: INTERNAL MEDICINE

## 2022-01-01 PROCEDURE — 25000003 PHARM REV CODE 250: Mod: PN | Performed by: INTERNAL MEDICINE

## 2022-01-01 PROCEDURE — 99225 PR SUBSEQUENT OBSERVATION CARE,LEVEL II: ICD-10-PCS | Mod: ,,, | Performed by: INTERNAL MEDICINE

## 2022-01-01 PROCEDURE — 63600175 PHARM REV CODE 636 W HCPCS: Mod: JG,PN | Performed by: INTERNAL MEDICINE

## 2022-01-01 PROCEDURE — 25500020 PHARM REV CODE 255: Performed by: INTERNAL MEDICINE

## 2022-01-01 PROCEDURE — 3078F PR MOST RECENT DIASTOLIC BLOOD PRESSURE < 80 MM HG: ICD-10-PCS | Mod: CPTII,,, | Performed by: INTERNAL MEDICINE

## 2022-01-01 PROCEDURE — 99999 PR PBB SHADOW E&M-EST. PATIENT-LVL IV: ICD-10-PCS | Mod: PBBFAC,,, | Performed by: INTERNAL MEDICINE

## 2022-01-01 PROCEDURE — 80053 COMPREHEN METABOLIC PANEL: CPT | Performed by: INTERNAL MEDICINE

## 2022-01-01 PROCEDURE — 3078F DIAST BP <80 MM HG: CPT | Mod: CPTII,,, | Performed by: INTERNAL MEDICINE

## 2022-01-01 PROCEDURE — 3074F PR MOST RECENT SYSTOLIC BLOOD PRESSURE < 130 MM HG: ICD-10-PCS | Mod: CPTII,,, | Performed by: INTERNAL MEDICINE

## 2022-01-01 PROCEDURE — 99999 PR PBB SHADOW E&M-EST. PATIENT-LVL III: CPT | Mod: PBBFAC,,, | Performed by: INTERNAL MEDICINE

## 2022-01-01 PROCEDURE — 74177 CT CHEST ABDOMEN PELVIS WITH CONTRAST (XPD): ICD-10-PCS | Mod: 26,,, | Performed by: RADIOLOGY

## 2022-01-01 PROCEDURE — 82390 ASSAY OF CERULOPLASMIN: CPT | Performed by: INTERNAL MEDICINE

## 2022-01-01 PROCEDURE — A9698 NON-RAD CONTRAST MATERIALNOC: HCPCS | Mod: PO | Performed by: INTERNAL MEDICINE

## 2022-01-01 PROCEDURE — 1160F RVW MEDS BY RX/DR IN RCRD: CPT | Mod: CPTII,,, | Performed by: NURSE PRACTITIONER

## 2022-01-01 PROCEDURE — 99223 PR INITIAL HOSPITAL CARE,LEVL III: ICD-10-PCS | Mod: ,,, | Performed by: INTERNAL MEDICINE

## 2022-01-01 PROCEDURE — 99213 OFFICE O/P EST LOW 20 MIN: CPT | Mod: PBBFAC,25,PN | Performed by: INTERNAL MEDICINE

## 2022-01-01 PROCEDURE — 1159F PR MEDICATION LIST DOCUMENTED IN MEDICAL RECORD: ICD-10-PCS | Mod: CPTII,,, | Performed by: NURSE PRACTITIONER

## 2022-01-01 PROCEDURE — 80321 ALCOHOLS BIOMARKERS 1OR 2: CPT | Performed by: INTERNAL MEDICINE

## 2022-01-01 PROCEDURE — A9585 GADOBUTROL INJECTION: HCPCS | Performed by: INTERNAL MEDICINE

## 2022-01-01 PROCEDURE — 99213 OFFICE O/P EST LOW 20 MIN: CPT | Mod: PBBFAC,PN,25 | Performed by: INTERNAL MEDICINE

## 2022-01-01 PROCEDURE — 71260 CT THORAX DX C+: CPT | Mod: TC,PO

## 2022-01-01 PROCEDURE — 85027 COMPLETE CBC AUTOMATED: CPT | Performed by: INTERNAL MEDICINE

## 2022-01-01 PROCEDURE — 99214 PR OFFICE/OUTPT VISIT, EST, LEVL IV, 30-39 MIN: ICD-10-PCS | Mod: FS,,, | Performed by: STUDENT IN AN ORGANIZED HEALTH CARE EDUCATION/TRAINING PROGRAM

## 2022-01-01 PROCEDURE — 84443 ASSAY THYROID STIM HORMONE: CPT | Performed by: INTERNAL MEDICINE

## 2022-01-01 PROCEDURE — 3079F PR MOST RECENT DIASTOLIC BLOOD PRESSURE 80-89 MM HG: ICD-10-PCS | Mod: CPTII,,, | Performed by: INTERNAL MEDICINE

## 2022-01-01 PROCEDURE — 36415 PR COLLECTION VENOUS BLOOD,VENIPUNCTURE: ICD-10-PCS | Mod: ,,, | Performed by: STUDENT IN AN ORGANIZED HEALTH CARE EDUCATION/TRAINING PROGRAM

## 2022-01-01 PROCEDURE — 36415 COLL VENOUS BLD VENIPUNCTURE: CPT | Mod: ,,, | Performed by: STUDENT IN AN ORGANIZED HEALTH CARE EDUCATION/TRAINING PROGRAM

## 2022-01-01 PROCEDURE — 99233 SBSQ HOSP IP/OBS HIGH 50: CPT | Mod: ,,, | Performed by: INTERNAL MEDICINE

## 2022-01-01 PROCEDURE — A4216 STERILE WATER/SALINE, 10 ML: HCPCS | Mod: PN | Performed by: INTERNAL MEDICINE

## 2022-01-01 PROCEDURE — 86301 IMMUNOASSAY TUMOR CA 19-9: CPT | Performed by: INTERNAL MEDICINE

## 2022-01-01 PROCEDURE — 25000003 PHARM REV CODE 250: Performed by: PHYSICIAN ASSISTANT

## 2022-01-01 PROCEDURE — 99213 OFFICE O/P EST LOW 20 MIN: CPT | Mod: PBBFAC,PN | Performed by: INTERNAL MEDICINE

## 2022-01-01 PROCEDURE — 99497 ADVNCD CARE PLAN 30 MIN: CPT | Mod: PBBFAC,PN | Performed by: NURSE PRACTITIONER

## 2022-01-01 PROCEDURE — 82784 ASSAY IGA/IGD/IGG/IGM EACH: CPT | Performed by: INTERNAL MEDICINE

## 2022-01-01 PROCEDURE — 86038 ANTINUCLEAR ANTIBODIES: CPT | Performed by: INTERNAL MEDICINE

## 2022-01-01 PROCEDURE — 99211 OFF/OP EST MAY X REQ PHY/QHP: CPT | Mod: PBBFAC,25,PN | Performed by: NURSE PRACTITIONER

## 2022-01-01 PROCEDURE — 74177 CT ABD & PELVIS W/CONTRAST: CPT | Mod: TC,PO

## 2022-01-01 PROCEDURE — 3008F PR BODY MASS INDEX (BMI) DOCUMENTED: ICD-10-PCS | Mod: CPTII,,, | Performed by: INTERNAL MEDICINE

## 2022-01-01 PROCEDURE — 99233 PR SUBSEQUENT HOSPITAL CARE,LEVL III: ICD-10-PCS | Mod: ,,, | Performed by: INTERNAL MEDICINE

## 2022-01-01 PROCEDURE — 99214 OFFICE O/P EST MOD 30 MIN: CPT | Mod: PBBFAC,PN | Performed by: INTERNAL MEDICINE

## 2022-01-01 PROCEDURE — 87522 HEPATITIS C REVRS TRNSCRPJ: CPT | Performed by: INTERNAL MEDICINE

## 2022-01-01 PROCEDURE — 99214 OFFICE O/P EST MOD 30 MIN: CPT | Mod: S$PBB,,, | Performed by: NURSE PRACTITIONER

## 2022-01-01 PROCEDURE — 83010 ASSAY OF HAPTOGLOBIN QUANT: CPT | Performed by: INTERNAL MEDICINE

## 2022-01-01 PROCEDURE — 1159F MED LIST DOCD IN RCRD: CPT | Mod: CPTII,,, | Performed by: NURSE PRACTITIONER

## 2022-01-01 PROCEDURE — G0379 DIRECT REFER HOSPITAL OBSERV: HCPCS

## 2022-01-01 PROCEDURE — 20600001 HC STEP DOWN PRIVATE ROOM

## 2022-01-01 PROCEDURE — 99999 PR PBB SHADOW E&M-EST. PATIENT-LVL IV: CPT | Mod: PBBFAC,,, | Performed by: INTERNAL MEDICINE

## 2022-01-01 PROCEDURE — 25500020 PHARM REV CODE 255: Mod: PO | Performed by: INTERNAL MEDICINE

## 2022-01-01 PROCEDURE — 99225 PR SUBSEQUENT OBSERVATION CARE,LEVEL II: CPT | Mod: ,,, | Performed by: INTERNAL MEDICINE

## 2022-01-01 PROCEDURE — 85045 AUTOMATED RETICULOCYTE COUNT: CPT | Mod: PN | Performed by: INTERNAL MEDICINE

## 2022-01-01 PROCEDURE — 3080F DIAST BP >= 90 MM HG: CPT | Mod: CPTII,,, | Performed by: INTERNAL MEDICINE

## 2022-01-01 PROCEDURE — 99225 PR SUBSEQUENT OBSERVATION CARE,LEVEL II: ICD-10-PCS | Mod: ,,, | Performed by: STUDENT IN AN ORGANIZED HEALTH CARE EDUCATION/TRAINING PROGRAM

## 2022-01-01 PROCEDURE — 63600175 PHARM REV CODE 636 W HCPCS: Performed by: INTERNAL MEDICINE

## 2022-01-01 PROCEDURE — 80053 COMPREHEN METABOLIC PANEL: CPT | Mod: PN | Performed by: INTERNAL MEDICINE

## 2022-01-01 PROCEDURE — 99223 PR INITIAL HOSPITAL CARE,LEVL III: ICD-10-PCS | Mod: ,,, | Performed by: PHYSICIAN ASSISTANT

## 2022-01-01 PROCEDURE — 63600175 PHARM REV CODE 636 W HCPCS: Performed by: STUDENT IN AN ORGANIZED HEALTH CARE EDUCATION/TRAINING PROGRAM

## 2022-01-01 PROCEDURE — 99999 PR PBB SHADOW E&M-EST. PATIENT-LVL IV: ICD-10-PCS | Mod: PBBFAC,,, | Performed by: NURSE PRACTITIONER

## 2022-01-01 PROCEDURE — 74177 CT ABD & PELVIS W/CONTRAST: CPT | Mod: 26,,, | Performed by: RADIOLOGY

## 2022-01-01 PROCEDURE — 71260 CT CHEST ABDOMEN PELVIS WITH CONTRAST (XPD): ICD-10-PCS | Mod: 26,,, | Performed by: RADIOLOGY

## 2022-01-01 PROCEDURE — 36415 COLL VENOUS BLD VENIPUNCTURE: CPT | Performed by: PHYSICIAN ASSISTANT

## 2022-01-01 PROCEDURE — 83735 ASSAY OF MAGNESIUM: CPT | Performed by: PHYSICIAN ASSISTANT

## 2022-01-01 PROCEDURE — 83615 LACTATE (LD) (LDH) ENZYME: CPT | Mod: PN | Performed by: INTERNAL MEDICINE

## 2022-01-01 PROCEDURE — 80053 COMPREHEN METABOLIC PANEL: CPT | Performed by: PHYSICIAN ASSISTANT

## 2022-01-01 PROCEDURE — 99223 1ST HOSP IP/OBS HIGH 75: CPT | Mod: ,,, | Performed by: INTERNAL MEDICINE

## 2022-01-01 PROCEDURE — 86235 NUCLEAR ANTIGEN ANTIBODY: CPT | Performed by: INTERNAL MEDICINE

## 2022-01-01 PROCEDURE — 99214 PR OFFICE/OUTPT VISIT, EST, LEVL IV, 30-39 MIN: ICD-10-PCS | Mod: S$PBB,,, | Performed by: NURSE PRACTITIONER

## 2022-01-01 PROCEDURE — 85060 BLOOD SMEAR INTERPRETATION: CPT | Mod: ,,, | Performed by: PATHOLOGY

## 2022-01-01 PROCEDURE — 99497 PR ADVNCD CARE PLAN 30 MIN: ICD-10-PCS | Mod: S$PBB,,, | Performed by: NURSE PRACTITIONER

## 2022-01-01 PROCEDURE — 3080F PR MOST RECENT DIASTOLIC BLOOD PRESSURE >= 90 MM HG: ICD-10-PCS | Mod: CPTII,,, | Performed by: INTERNAL MEDICINE

## 2022-01-01 PROCEDURE — 84466 ASSAY OF TRANSFERRIN: CPT | Performed by: INTERNAL MEDICINE

## 2022-01-01 PROCEDURE — 99223 1ST HOSP IP/OBS HIGH 75: CPT | Mod: ,,, | Performed by: PHYSICIAN ASSISTANT

## 2022-01-01 PROCEDURE — 99497 ADVNCD CARE PLAN 30 MIN: CPT | Mod: S$PBB,,, | Performed by: NURSE PRACTITIONER

## 2022-01-01 PROCEDURE — 99999 PR PBB SHADOW E&M-EST. PATIENT-LVL I: CPT | Mod: PBBFAC,,, | Performed by: NURSE PRACTITIONER

## 2022-01-01 PROCEDURE — 83735 ASSAY OF MAGNESIUM: CPT | Performed by: INTERNAL MEDICINE

## 2022-01-01 PROCEDURE — 90471 IMMUNIZATION ADMIN: CPT | Performed by: STUDENT IN AN ORGANIZED HEALTH CARE EDUCATION/TRAINING PROGRAM

## 2022-01-01 PROCEDURE — 99999 PR PBB SHADOW E&M-EST. PATIENT-LVL IV: CPT | Mod: PBBFAC,,, | Performed by: NURSE PRACTITIONER

## 2022-01-01 PROCEDURE — 84100 ASSAY OF PHOSPHORUS: CPT | Performed by: INTERNAL MEDICINE

## 2022-01-01 PROCEDURE — 99225 PR SUBSEQUENT OBSERVATION CARE,LEVEL II: CPT | Mod: ,,, | Performed by: STUDENT IN AN ORGANIZED HEALTH CARE EDUCATION/TRAINING PROGRAM

## 2022-01-01 PROCEDURE — 1160F PR REVIEW ALL MEDS BY PRESCRIBER/CLIN PHARMACIST DOCUMENTED: ICD-10-PCS | Mod: CPTII,,, | Performed by: NURSE PRACTITIONER

## 2022-01-01 PROCEDURE — 99214 OFFICE O/P EST MOD 30 MIN: CPT | Mod: FS,,, | Performed by: STUDENT IN AN ORGANIZED HEALTH CARE EDUCATION/TRAINING PROGRAM

## 2022-01-01 PROCEDURE — 82103 ALPHA-1-ANTITRYPSIN TOTAL: CPT | Performed by: INTERNAL MEDICINE

## 2022-01-01 PROCEDURE — 85610 PROTHROMBIN TIME: CPT | Performed by: PHYSICIAN ASSISTANT

## 2022-01-01 PROCEDURE — 99213 OFFICE O/P EST LOW 20 MIN: CPT | Mod: PBBFAC,25,27,PN | Performed by: INTERNAL MEDICINE

## 2022-01-01 PROCEDURE — 82378 CARCINOEMBRYONIC ANTIGEN: CPT | Performed by: INTERNAL MEDICINE

## 2022-01-01 RX ORDER — HEPARIN 100 UNIT/ML
500 SYRINGE INTRAVENOUS
Status: CANCELLED | OUTPATIENT
Start: 2022-01-01

## 2022-01-01 RX ORDER — MAGNESIUM SULFATE HEPTAHYDRATE 40 MG/ML
2 INJECTION, SOLUTION INTRAVENOUS ONCE
Status: COMPLETED | OUTPATIENT
Start: 2022-01-01 | End: 2022-01-01

## 2022-01-01 RX ORDER — CYCLOBENZAPRINE HCL 5 MG
5 TABLET ORAL 3 TIMES DAILY PRN
Qty: 45 TABLET | Refills: 0 | Status: SHIPPED | OUTPATIENT
Start: 2022-01-01 | End: 2022-01-01

## 2022-01-01 RX ORDER — HEPARIN 100 UNIT/ML
500 SYRINGE INTRAVENOUS
Status: DISCONTINUED | OUTPATIENT
Start: 2022-01-01 | End: 2022-01-01 | Stop reason: HOSPADM

## 2022-01-01 RX ORDER — SENNOSIDES 8.6 MG/1
1 TABLET ORAL DAILY
Qty: 30 TABLET | Refills: 0 | Status: SHIPPED | OUTPATIENT
Start: 2022-01-01 | End: 2022-01-01

## 2022-01-01 RX ORDER — PANTOPRAZOLE SODIUM 40 MG/1
40 TABLET, DELAYED RELEASE ORAL EVERY MORNING
Status: DISCONTINUED | OUTPATIENT
Start: 2022-01-01 | End: 2022-01-01 | Stop reason: HOSPADM

## 2022-01-01 RX ORDER — LACTULOSE 10 G/15ML
10 SOLUTION ORAL 3 TIMES DAILY
Qty: 1350 ML | Refills: 0 | Status: SHIPPED | OUTPATIENT
Start: 2022-01-01 | End: 2022-01-01

## 2022-01-01 RX ORDER — SODIUM CHLORIDE 0.9 % (FLUSH) 0.9 %
10 SYRINGE (ML) INJECTION
Status: CANCELLED | OUTPATIENT
Start: 2022-01-01

## 2022-01-01 RX ORDER — DIPHENHYDRAMINE HYDROCHLORIDE 50 MG/ML
50 INJECTION INTRAMUSCULAR; INTRAVENOUS ONCE AS NEEDED
Status: CANCELLED | OUTPATIENT
Start: 2022-01-01

## 2022-01-01 RX ORDER — GLUCAGON 1 MG
1 KIT INJECTION
Status: DISCONTINUED | OUTPATIENT
Start: 2022-01-01 | End: 2022-01-01 | Stop reason: HOSPADM

## 2022-01-01 RX ORDER — POLYETHYLENE GLYCOL 3350 17 G/17G
17 POWDER, FOR SOLUTION ORAL DAILY PRN
Status: DISCONTINUED | OUTPATIENT
Start: 2022-01-01 | End: 2022-01-01

## 2022-01-01 RX ORDER — GABAPENTIN 300 MG/1
300 CAPSULE ORAL 2 TIMES DAILY
Status: DISCONTINUED | OUTPATIENT
Start: 2022-01-01 | End: 2022-01-01 | Stop reason: HOSPADM

## 2022-01-01 RX ORDER — POLYETHYLENE GLYCOL 3350 17 G/17G
17 POWDER, FOR SOLUTION ORAL DAILY
Status: DISCONTINUED | OUTPATIENT
Start: 2022-01-01 | End: 2022-01-01 | Stop reason: HOSPADM

## 2022-01-01 RX ORDER — EPINEPHRINE 0.3 MG/.3ML
0.3 INJECTION SUBCUTANEOUS ONCE AS NEEDED
Status: DISCONTINUED | OUTPATIENT
Start: 2022-01-01 | End: 2022-01-01 | Stop reason: HOSPADM

## 2022-01-01 RX ORDER — IBUPROFEN 200 MG
16 TABLET ORAL
Status: DISCONTINUED | OUTPATIENT
Start: 2022-01-01 | End: 2022-01-01 | Stop reason: HOSPADM

## 2022-01-01 RX ORDER — ALBUTEROL SULFATE 90 UG/1
2 AEROSOL, METERED RESPIRATORY (INHALATION) EVERY 4 HOURS PRN
Qty: 18 G | Refills: 6 | Status: SHIPPED | OUTPATIENT
Start: 2022-01-01

## 2022-01-01 RX ORDER — OXYCODONE HYDROCHLORIDE 10 MG/1
10 TABLET ORAL EVERY 4 HOURS PRN
Qty: 60 TABLET | Refills: 0 | Status: SHIPPED | OUTPATIENT
Start: 2022-01-01 | End: 2022-01-01 | Stop reason: SDUPTHER

## 2022-01-01 RX ORDER — ONDANSETRON 2 MG/ML
4 INJECTION INTRAMUSCULAR; INTRAVENOUS EVERY 8 HOURS PRN
Status: DISCONTINUED | OUTPATIENT
Start: 2022-01-01 | End: 2022-01-01 | Stop reason: HOSPADM

## 2022-01-01 RX ORDER — SODIUM CHLORIDE 0.9 % (FLUSH) 0.9 %
10 SYRINGE (ML) INJECTION
Status: DISCONTINUED | OUTPATIENT
Start: 2022-01-01 | End: 2022-01-01 | Stop reason: HOSPADM

## 2022-01-01 RX ORDER — ONDANSETRON 8 MG/1
8 TABLET, ORALLY DISINTEGRATING ORAL EVERY 8 HOURS PRN
Qty: 40 TABLET | Refills: 3 | Status: SHIPPED | OUTPATIENT
Start: 2022-01-01 | End: 2023-07-28

## 2022-01-01 RX ORDER — LEVOFLOXACIN 750 MG/1
750 TABLET ORAL DAILY
Status: COMPLETED | OUTPATIENT
Start: 2022-01-01 | End: 2022-01-01

## 2022-01-01 RX ORDER — BISACODYL 10 MG
10 SUPPOSITORY, RECTAL RECTAL DAILY PRN
Status: DISCONTINUED | OUTPATIENT
Start: 2022-01-01 | End: 2022-01-01 | Stop reason: HOSPADM

## 2022-01-01 RX ORDER — LOPERAMIDE HYDROCHLORIDE 2 MG/1
2 CAPSULE ORAL 4 TIMES DAILY PRN
Qty: 30 CAPSULE | Refills: 2 | Status: SHIPPED | OUTPATIENT
Start: 2022-01-01

## 2022-01-01 RX ORDER — HYDROMORPHONE HYDROCHLORIDE 1 MG/ML
1 INJECTION, SOLUTION INTRAMUSCULAR; INTRAVENOUS; SUBCUTANEOUS EVERY 4 HOURS PRN
Status: DISCONTINUED | OUTPATIENT
Start: 2022-01-01 | End: 2022-01-01 | Stop reason: HOSPADM

## 2022-01-01 RX ORDER — SORAFENIB 200 MG/1
400 TABLET, FILM COATED ORAL 2 TIMES DAILY
Qty: 60 TABLET | Refills: 6 | Status: SHIPPED | OUTPATIENT
Start: 2022-01-01 | End: 2022-01-01

## 2022-01-01 RX ORDER — DIPHENHYDRAMINE HYDROCHLORIDE 50 MG/ML
50 INJECTION INTRAMUSCULAR; INTRAVENOUS ONCE AS NEEDED
Status: DISCONTINUED | OUTPATIENT
Start: 2022-01-01 | End: 2022-01-01 | Stop reason: HOSPADM

## 2022-01-01 RX ORDER — OXYCODONE AND ACETAMINOPHEN 10; 325 MG/1; MG/1
1 TABLET ORAL EVERY 4 HOURS PRN
Status: DISCONTINUED | OUTPATIENT
Start: 2022-01-01 | End: 2022-01-01

## 2022-01-01 RX ORDER — ACETAMINOPHEN 325 MG/1
650 TABLET ORAL EVERY 4 HOURS PRN
Status: DISCONTINUED | OUTPATIENT
Start: 2022-01-01 | End: 2022-01-01

## 2022-01-01 RX ORDER — AMOXICILLIN 250 MG
1 CAPSULE ORAL 2 TIMES DAILY
Status: DISCONTINUED | OUTPATIENT
Start: 2022-01-01 | End: 2022-01-01 | Stop reason: HOSPADM

## 2022-01-01 RX ORDER — OXYCODONE AND ACETAMINOPHEN 10; 325 MG/1; MG/1
1 TABLET ORAL EVERY 4 HOURS PRN
Status: DISCONTINUED | OUTPATIENT
Start: 2022-01-01 | End: 2022-01-01 | Stop reason: HOSPADM

## 2022-01-01 RX ORDER — EPINEPHRINE 0.3 MG/.3ML
0.3 INJECTION SUBCUTANEOUS ONCE AS NEEDED
Status: CANCELLED | OUTPATIENT
Start: 2022-01-01

## 2022-01-01 RX ORDER — ZOLPIDEM TARTRATE 5 MG/1
5 TABLET ORAL NIGHTLY PRN
Qty: 10 TABLET | Refills: 0 | Status: SHIPPED | OUTPATIENT
Start: 2022-01-01 | End: 2022-01-01

## 2022-01-01 RX ORDER — AMLODIPINE BESYLATE 5 MG/1
5 TABLET ORAL EVERY MORNING
Status: DISCONTINUED | OUTPATIENT
Start: 2022-01-01 | End: 2022-01-01 | Stop reason: HOSPADM

## 2022-01-01 RX ORDER — TALC
6 POWDER (GRAM) TOPICAL NIGHTLY PRN
Status: DISCONTINUED | OUTPATIENT
Start: 2022-01-01 | End: 2022-01-01 | Stop reason: HOSPADM

## 2022-01-01 RX ORDER — GADOBUTROL 604.72 MG/ML
10 INJECTION INTRAVENOUS
Status: COMPLETED | OUTPATIENT
Start: 2022-01-01 | End: 2022-01-01

## 2022-01-01 RX ORDER — OXYCODONE HYDROCHLORIDE 5 MG/1
5 TABLET ORAL EVERY 4 HOURS PRN
Qty: 60 TABLET | Refills: 0 | Status: SHIPPED | OUTPATIENT
Start: 2022-01-01 | End: 2022-01-01 | Stop reason: DRUGHIGH

## 2022-01-01 RX ORDER — ACETAMINOPHEN 500 MG
500 TABLET ORAL EVERY 8 HOURS PRN
Status: DISCONTINUED | OUTPATIENT
Start: 2022-01-01 | End: 2022-01-01 | Stop reason: HOSPADM

## 2022-01-01 RX ORDER — IBUPROFEN 200 MG
24 TABLET ORAL
Status: DISCONTINUED | OUTPATIENT
Start: 2022-01-01 | End: 2022-01-01 | Stop reason: HOSPADM

## 2022-01-01 RX ORDER — SODIUM CHLORIDE 0.9 % (FLUSH) 0.9 %
10 SYRINGE (ML) INJECTION EVERY 8 HOURS PRN
Status: DISCONTINUED | OUTPATIENT
Start: 2022-01-01 | End: 2022-01-01 | Stop reason: HOSPADM

## 2022-01-01 RX ORDER — IPRATROPIUM BROMIDE AND ALBUTEROL SULFATE 2.5; .5 MG/3ML; MG/3ML
3 SOLUTION RESPIRATORY (INHALATION) EVERY 4 HOURS PRN
Status: DISCONTINUED | OUTPATIENT
Start: 2022-01-01 | End: 2022-01-01 | Stop reason: HOSPADM

## 2022-01-01 RX ORDER — LACTULOSE 10 G/15ML
SOLUTION ORAL; RECTAL
COMMUNITY
Start: 2022-01-01

## 2022-01-01 RX ORDER — TRAZODONE HYDROCHLORIDE 50 MG/1
50 TABLET ORAL NIGHTLY PRN
COMMUNITY

## 2022-01-01 RX ORDER — OXYCODONE AND ACETAMINOPHEN 5; 325 MG/1; MG/1
1 TABLET ORAL EVERY 4 HOURS PRN
Status: DISCONTINUED | OUTPATIENT
Start: 2022-01-01 | End: 2022-01-01

## 2022-01-01 RX ADMIN — HYDROMORPHONE HYDROCHLORIDE 1 MG: 1 INJECTION, SOLUTION INTRAMUSCULAR; INTRAVENOUS; SUBCUTANEOUS at 09:06

## 2022-01-01 RX ADMIN — AMLODIPINE BESYLATE 5 MG: 5 TABLET ORAL at 08:06

## 2022-01-01 RX ADMIN — Medication 10 ML: at 11:10

## 2022-01-01 RX ADMIN — OXYCODONE AND ACETAMINOPHEN 1 TABLET: 10; 325 TABLET ORAL at 09:06

## 2022-01-01 RX ADMIN — PANTOPRAZOLE SODIUM 40 MG: 40 TABLET, DELAYED RELEASE ORAL at 09:06

## 2022-01-01 RX ADMIN — Medication 6 MG: at 09:06

## 2022-01-01 RX ADMIN — SODIUM CHLORIDE 200 MG: 9 INJECTION, SOLUTION INTRAVENOUS at 10:09

## 2022-01-01 RX ADMIN — SODIUM CHLORIDE: 0.9 INJECTION, SOLUTION INTRAVENOUS at 10:09

## 2022-01-01 RX ADMIN — HYDROMORPHONE HYDROCHLORIDE 1 MG: 1 INJECTION, SOLUTION INTRAMUSCULAR; INTRAVENOUS; SUBCUTANEOUS at 10:06

## 2022-01-01 RX ADMIN — SODIUM CHLORIDE 200 MG: 9 INJECTION, SOLUTION INTRAVENOUS at 01:11

## 2022-01-01 RX ADMIN — Medication 10 ML: at 12:09

## 2022-01-01 RX ADMIN — AMLODIPINE BESYLATE 5 MG: 5 TABLET ORAL at 09:06

## 2022-01-01 RX ADMIN — Medication 6 MG: at 10:06

## 2022-01-01 RX ADMIN — SODIUM CHLORIDE: 0.9 INJECTION, SOLUTION INTRAVENOUS at 10:08

## 2022-01-01 RX ADMIN — BARIUM SULFATE 900 ML: 20 SUSPENSION ORAL at 04:11

## 2022-01-01 RX ADMIN — SODIUM CHLORIDE: 0.9 INJECTION, SOLUTION INTRAVENOUS at 11:09

## 2022-01-01 RX ADMIN — PNEUMOCOCCAL 20-VALENT CONJUGATE VACCINE 0.5 ML
2.2; 2.2; 2.2; 2.2; 2.2; 2.2; 2.2; 2.2; 2.2; 2.2; 2.2; 2.2; 2.2; 2.2; 2.2; 2.2; 4.4; 2.2; 2.2; 2.2 INJECTION, SUSPENSION INTRAMUSCULAR at 05:06

## 2022-01-01 RX ADMIN — GABAPENTIN 300 MG: 300 CAPSULE ORAL at 08:06

## 2022-01-01 RX ADMIN — BARIUM SULFATE 900 ML: 20 SUSPENSION ORAL at 01:09

## 2022-01-01 RX ADMIN — GABAPENTIN 300 MG: 300 CAPSULE ORAL at 09:06

## 2022-01-01 RX ADMIN — OXYCODONE AND ACETAMINOPHEN 1 TABLET: 10; 325 TABLET ORAL at 08:06

## 2022-01-01 RX ADMIN — LEVOFLOXACIN 750 MG: 750 TABLET, FILM COATED ORAL at 10:06

## 2022-01-01 RX ADMIN — SODIUM CHLORIDE 200 MG: 9 INJECTION, SOLUTION INTRAVENOUS at 10:08

## 2022-01-01 RX ADMIN — MAGNESIUM SULFATE 2 G: 2 INJECTION INTRAVENOUS at 09:06

## 2022-01-01 RX ADMIN — OXYCODONE AND ACETAMINOPHEN 1 TABLET: 10; 325 TABLET ORAL at 02:06

## 2022-01-01 RX ADMIN — SODIUM CHLORIDE: 0.9 INJECTION, SOLUTION INTRAVENOUS at 11:11

## 2022-01-01 RX ADMIN — LEVOFLOXACIN 750 MG: 750 TABLET, FILM COATED ORAL at 08:06

## 2022-01-01 RX ADMIN — PANTOPRAZOLE SODIUM 40 MG: 40 TABLET, DELAYED RELEASE ORAL at 08:06

## 2022-01-01 RX ADMIN — IOHEXOL 75 ML: 350 INJECTION, SOLUTION INTRAVENOUS at 03:11

## 2022-01-01 RX ADMIN — IOHEXOL 75 ML: 350 INJECTION, SOLUTION INTRAVENOUS at 12:09

## 2022-01-01 RX ADMIN — GABAPENTIN 300 MG: 300 CAPSULE ORAL at 10:06

## 2022-01-01 RX ADMIN — AMLODIPINE BESYLATE 5 MG: 5 TABLET ORAL at 10:06

## 2022-01-01 RX ADMIN — ACETAMINOPHEN 650 MG: 325 TABLET ORAL at 09:06

## 2022-01-01 RX ADMIN — SENNOSIDES AND DOCUSATE SODIUM 1 TABLET: 50; 8.6 TABLET ORAL at 09:06

## 2022-01-01 RX ADMIN — SENNOSIDES AND DOCUSATE SODIUM 1 TABLET: 50; 8.6 TABLET ORAL at 10:06

## 2022-01-01 RX ADMIN — SODIUM CHLORIDE: 0.9 INJECTION, SOLUTION INTRAVENOUS at 11:10

## 2022-01-01 RX ADMIN — SODIUM CHLORIDE: 0.9 INJECTION, SOLUTION INTRAVENOUS at 11:07

## 2022-01-01 RX ADMIN — HUMAN ALBUMIN MICROSPHERES AND PERFLUTREN 0.11 MG: 10; .22 INJECTION, SOLUTION INTRAVENOUS at 12:06

## 2022-01-01 RX ADMIN — GADOBUTROL 10 ML: 604.72 INJECTION INTRAVENOUS at 04:06

## 2022-01-01 RX ADMIN — ACETAMINOPHEN 500 MG: 500 TABLET ORAL at 09:06

## 2022-01-01 RX ADMIN — OXYCODONE AND ACETAMINOPHEN 1 TABLET: 10; 325 TABLET ORAL at 06:06

## 2022-01-01 RX ADMIN — SODIUM CHLORIDE 200 MG: 9 INJECTION, SOLUTION INTRAVENOUS at 12:09

## 2022-01-01 RX ADMIN — PANTOPRAZOLE SODIUM 40 MG: 40 TABLET, DELAYED RELEASE ORAL at 10:06

## 2022-01-01 RX ADMIN — SODIUM CHLORIDE 200 MG: 9 INJECTION, SOLUTION INTRAVENOUS at 11:07

## 2022-01-01 RX ADMIN — SENNOSIDES AND DOCUSATE SODIUM 1 TABLET: 50; 8.6 TABLET ORAL at 08:06

## 2022-01-01 RX ADMIN — OXYCODONE HYDROCHLORIDE AND ACETAMINOPHEN 1 TABLET: 5; 325 TABLET ORAL at 08:06

## 2022-01-01 RX ADMIN — POLYETHYLENE GLYCOL 3350 17 G: 17 POWDER, FOR SOLUTION ORAL at 09:06

## 2022-01-01 RX ADMIN — SENNOSIDES AND DOCUSATE SODIUM 1 TABLET: 50; 8.6 TABLET ORAL at 11:06

## 2022-01-01 RX ADMIN — OXYCODONE AND ACETAMINOPHEN 1 TABLET: 10; 325 TABLET ORAL at 10:06

## 2022-01-01 RX ADMIN — SODIUM CHLORIDE 200 MG: 9 INJECTION, SOLUTION INTRAVENOUS at 11:10

## 2022-06-17 PROBLEM — I82.519 CHRONIC DEEP VEIN THROMBOSIS (DVT) OF FEMORAL VEIN: Status: ACTIVE | Noted: 2022-01-01

## 2022-06-17 PROBLEM — I82.220: Status: ACTIVE | Noted: 2022-01-01

## 2022-06-17 PROBLEM — K21.9 GERD (GASTROESOPHAGEAL REFLUX DISEASE): Status: ACTIVE | Noted: 2022-01-01

## 2022-06-17 PROBLEM — D64.9 ANEMIA: Status: ACTIVE | Noted: 2022-01-01

## 2022-06-17 PROBLEM — R16.0 LIVER MASS: Status: ACTIVE | Noted: 2022-01-01

## 2022-06-17 PROBLEM — I10 HTN (HYPERTENSION): Status: ACTIVE | Noted: 2022-01-01

## 2022-06-17 PROBLEM — R19.00 INTRAABDOMINAL MASS: Status: ACTIVE | Noted: 2022-01-01

## 2022-06-17 PROBLEM — R63.4 WEIGHT LOSS, UNINTENTIONAL: Status: ACTIVE | Noted: 2022-01-01

## 2022-06-17 PROBLEM — R19.00 INTRAABDOMINAL MASS: Status: RESOLVED | Noted: 2022-01-01 | Resolved: 2022-01-01

## 2022-06-17 NOTE — HPI
"Irving Linn is a 61 y.o. male with PMHx significant for HTN,, GERD and DVT on Xarelto admitted to hospital medicine as a transfer from [facility] ED for abnormal CT imagine. Patient presented to [facility] ED complaining of shortness of breath that has progressively worsened over the last 8 months. Labs were largely unremarkable.  Chest x-ray revealed no acute abnormalities did not appreciate any infiltrates. Ultrasound of bilateral lower extremities revealed a nonocclusive left distal superficial partial femoral DVT. CTA chest revealed bilateral small pleural effusions, no PE. CT scan abdomen pelvis with IV contrast revealed "apparent soft tissue density involving the cephalad portion of the inferior vena cava and extending into the inferior portion of the right atrium. This measures 6 x 4 by 11.2 cm in AP medial-lateral and craniocaudal dimensions respectively. There is a fluid collection in the inferior right lobe of the liver measuring 4.5 by 4.2 cm." Radiologist at outside facility felt that there may be a mass in the right atrium possibly a myxoma that is the source of the clot. 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 CTS recommended transfer to [facility] for higher level of care.      On arrival, patient reports minimal improvement of shortness of breath with supplemental oxygen. Endorses chest pain with deep breaths and unintentional weight loss over the last few months. Denies fever/chills, HA, cough, abdominal pain, n/v, diarrhea, constipation, urinary symptoms, weakness. AFVSS.   "

## 2022-06-17 NOTE — NURSING
REPORT RECEIVED FROM CARLOS JOSEPH AT APPROXIMATELY 0430.  PATIENT AT THAT TIME WAS STILL WAITING FOR TRANSPORT.  PATIENT HAS NOT ARRIVED AT THIS TIME; REPORT WILL BE GIVEN TO MORNING NURSE.

## 2022-06-17 NOTE — ASSESSMENT & PLAN NOTE
Liver Mass  - presented to outside facility complaining of shortness of breath ~8 months  - CT A/p showed apparent soft tissue density involving the cephalad portion of the inferior vena cava and extending into the inferior portion of the right atrium. This measures 6 x 4 by 11.2 cm in AP medial-lateral and craniocaudal dimensions respectively. There is a fluid collection in the inferior right lobe of the liver measuring 4.5 by 4.2 cm.  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 We can biopsy the liver lesion in segment 5.  We are unable to biopsy the lesion extending to the right atrium.   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 If AFP not elevated w/ atypical imaging will likely need biopsy.  -- Would not recommend anticoagulation for tumor thrombus. May need IR for prophylactic stent placement of IVC given near occlusion of RA.   -- case will be discussed in tumor board

## 2022-06-17 NOTE — SUBJECTIVE & OBJECTIVE
No past medical history on file.    Past Surgical History:   Procedure Laterality Date    MANDIBLE FRACTURE SURGERY         Review of patient's allergies indicates:  No Known Allergies    Current Facility-Administered Medications on File Prior to Encounter   Medication    [COMPLETED] iodixanoL (VISIPAQUE 320) injection 100 mL    [DISCONTINUED] albuterol inhaler 2 puff    [DISCONTINUED] amLODIPine tablet 5 mg    [DISCONTINUED] gabapentin capsule 300 mg    [DISCONTINUED] pantoprazole EC tablet 40 mg     Current Outpatient Medications on File Prior to Encounter   Medication Sig    albuterol (PROVENTIL/VENTOLIN HFA) 90 mcg/actuation inhaler Inhale 2 puffs into the lungs every 4 (four) hours as needed for Shortness of Breath.    amLODIPine (NORVASC) 5 MG tablet Take 5 mg by mouth every morning.    gabapentin (NEURONTIN) 300 MG capsule Take 300 mg by mouth 2 (two) times daily.    levoFLOXacin (LEVAQUIN) 750 MG tablet Take 750 mg by mouth once daily.    pantoprazole (PROTONIX) 40 MG tablet Take 40 mg by mouth every morning.    XARELTO 15 mg Tab Take 15 mg by mouth every morning.     Family History    None       Tobacco Use    Smoking status: Current Every Day Smoker     Packs/day: 1.00     Types: Cigarettes    Smokeless tobacco: Current User     Types: Chew   Substance and Sexual Activity    Alcohol use: No    Drug use: Yes    Sexual activity: Not on file     Comment: Crack     Review of Systems   Constitutional:  Negative for activity change, chills and fever.   HENT:  Negative for rhinorrhea, sinus pressure and trouble swallowing.    Eyes:  Negative for photophobia and visual disturbance.   Respiratory:  Positive for shortness of breath. Negative for chest tightness and wheezing.    Cardiovascular:  Positive for chest pain. Negative for palpitations and leg swelling.   Gastrointestinal:  Negative for abdominal pain, constipation, diarrhea, nausea and vomiting.   Genitourinary:  Negative for dysuria, frequency, hematuria  and urgency.   Musculoskeletal:  Negative for arthralgias, back pain and gait problem.   Skin:  Negative for color change and rash.   Neurological:  Negative for dizziness, syncope, weakness, light-headedness, numbness and headaches.   Psychiatric/Behavioral:  Negative for agitation and confusion. The patient is not nervous/anxious.    Objective:     Vital Signs (Most Recent):  Temp: 98.9 °F (37.2 °C) (06/17/22 0803)  Pulse: 66 (06/17/22 0803)  Resp: 12 (06/17/22 0803)  BP: 127/82 (06/17/22 0803)  SpO2: 100 % (06/17/22 0803) Vital Signs (24h Range):  Temp:  [98.3 °F (36.8 °C)-98.9 °F (37.2 °C)] 98.9 °F (37.2 °C)  Pulse:  [62-87] 66  Resp:  [12-23] 12  SpO2:  [99 %-100 %] 100 %  BP: (116-147)/(77-99) 127/82     Weight: 74.2 kg (163 lb 9.3 oz)  Body mass index is 24.87 kg/m².    Physical Exam  Vitals and nursing note reviewed.   Constitutional:       General: He is not in acute distress.     Appearance: Normal appearance. He is not ill-appearing.   HENT:      Head: Normocephalic and atraumatic.      Right Ear: External ear normal.      Left Ear: External ear normal.   Eyes:      Extraocular Movements: Extraocular movements intact.      Conjunctiva/sclera: Conjunctivae normal.      Pupils: Pupils are equal, round, and reactive to light.   Cardiovascular:      Rate and Rhythm: Normal rate and regular rhythm.      Pulses: Normal pulses.      Heart sounds: Normal heart sounds. No murmur heard.  Pulmonary:      Effort: Pulmonary effort is normal. No respiratory distress.      Breath sounds: Normal breath sounds.      Comments: Breathing comfortably on 2L NC  Abdominal:      General: Abdomen is flat. Bowel sounds are normal.      Tenderness: There is no abdominal tenderness.   Musculoskeletal:         General: Normal range of motion.      Cervical back: Normal range of motion and neck supple.      Right lower leg: No edema.      Left lower leg: No edema.   Skin:     General: Skin is warm and dry.      Capillary Refill:  Capillary refill takes less than 2 seconds.      Coloration: Skin is not jaundiced.   Neurological:      General: No focal deficit present.      Mental Status: He is alert and oriented to person, place, and time. Mental status is at baseline.      Cranial Nerves: No cranial nerve deficit.   Psychiatric:         Mood and Affect: Mood normal.         Behavior: Behavior normal.         CRANIAL NERVES     CN III, IV, VI   Pupils are equal, round, and reactive to light.     Significant Labs: All pertinent labs within the past 24 hours have been reviewed.  BMP:   Recent Labs   Lab 06/16/22 1930         K 3.6      CO2 22   BUN 17   CREATININE 1.05   CALCIUM 8.5     CBC:   Recent Labs   Lab 06/16/22 1930   WBC 4.90   HGB 9.6*   HCT 30.2*   *     CMP:   Recent Labs   Lab 06/16/22 1930      K 3.6      CO2 22      BUN 17   CREATININE 1.05   CALCIUM 8.5   PROT 8.7*   ALBUMIN 3.6   BILITOT 3.0*   ALKPHOS 115   *   ALT 45   ANIONGAP 8   EGFRNONAA >60       Significant Imaging: I have reviewed all pertinent imaging results/findings within the past 24 hours.

## 2022-06-17 NOTE — CONSULTS
Inpatient Radiology Consult Note    History of Present Illness:  Irving Linn is a 61 y.o. male with PMHx significant for HTN,, GERD and DVT on Xarelto who initially presented to [facility] ED with shortness of breath.  CT revealed large mass within IVC to R atrium. Pt transferred to [facility] for CTS evaluation. IR consulted for biopsy.    Plan:  IR biopsy can be performed in the outpatient setting. We can biopsy the liver lesion in segment 5.  We are unable to biopsy the lesion extending to the right atrium.  If biopsy of the segment 5 liver lesion is required, please place referral to IR clinic.      Stephanie Stewart MD  Department of Radiology, PGY-4  Pager: [phone]

## 2022-06-17 NOTE — PLAN OF CARE
[facility] ([place] [address])  Initial Discharge Assessment       Primary Care Provider: Primary Doctor No    Admission Diagnosis: Chest mass [R22.2]    Admission Date: 6/17/2022  Expected Discharge Date: 6/22/2022     CM at bedside to discuss discharge planning assessment, met patient no family at bedside.Initial discharge planning completed.     Discharge Barriers Identified: None    Payor: MEDICAID / Plan: [facility] COMMUNITY PLAN [facility] ([place] MEDICAID) / Product Type: Managed Medicaid /     Extended Emergency Contact Information  Primary Emergency Contact: RamAmarilys  Address: [address] [place] [address] [id number] [place] [address]  Home Phone: [phone]  Mobile Phone: [phone]  Relation: Sister  Secondary Emergency Contact: Shikha Dias  Mobile Phone: [phone]  Relation: Sister    Discharge Plan A: Home with family  Discharge Plan B: Home      [facility] - [address] [address]  Phone: [phone] Fax: [phone]      Initial Assessment (most recent)     Adult Discharge Assessment - 06/17/22 1529        Discharge Assessment    Assessment Type Discharge Planning Assessment     Confirmed/corrected address, phone number and insurance Yes     Confirmed Demographics Correct on Facesheet     Source of Information patient     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 Yes     Do you have prescription coverage? Yes     Is the patient taking medications as prescribed? yes     Who is going to help you get home at discharge?  [facility]aquin [facility]- [phone]     How do you get to doctors appointments? family or friend will provide     Are you on dialysis? No     Do you take coumadin? No     Discharge Plan A Home with family     Discharge Plan B Home     DME Needed Upon Discharge  none     Discharge Plan discussed with: Patient     Discharge Barriers Identified None        Relationship/Environment    Name(s) of Who Lives With Patient Faustino Rizvi-[phone]

## 2022-06-17 NOTE — ASSESSMENT & PLAN NOTE
Leg pain   - on Xarelto 15mg daily at home, reports last dose was 6/16  - will hold for now till evaluated by CTS for possible heparin gtt  - continue home gabapentin 300mg BID

## 2022-06-17 NOTE — NURSING
Patient received from EMS as transfer for higher level of care roughly 08:00 this day. Patient is in no acute distress, breathing normally with 2 lpm via nasal canula with SpO2 around 100%, normal effort/rate. Alert and oriented, pleasant and conversant. Only complaint is of pain in LLE and midsternal pain which he rates as 8/10 and constant, not new in onset. Worse somewhat with deep inspiration, no relieving factors. Also endorses pain in left upper quadrant abdomen, tender to palpation. Kept NPO pending CTS consult.

## 2022-06-17 NOTE — SUBJECTIVE & OBJECTIVE
Current Facility-Administered Medications on File Prior to Encounter   Medication    [COMPLETED] iodixanoL (VISIPAQUE 320) injection 100 mL    [DISCONTINUED] albuterol inhaler 2 puff    [DISCONTINUED] amLODIPine tablet 5 mg    [DISCONTINUED] gabapentin capsule 300 mg    [DISCONTINUED] pantoprazole EC tablet 40 mg     Current Outpatient Medications on File Prior to Encounter   Medication Sig    albuterol (PROVENTIL/VENTOLIN HFA) 90 mcg/actuation inhaler Inhale 2 puffs into the lungs every 4 (four) hours as needed for Shortness of Breath.    amLODIPine (NORVASC) 5 MG tablet Take 5 mg by mouth every morning.    gabapentin (NEURONTIN) 300 MG capsule Take 300 mg by mouth 2 (two) times daily.    levoFLOXacin (LEVAQUIN) 750 MG tablet Take 750 mg by mouth once daily.    pantoprazole (PROTONIX) 40 MG tablet Take 40 mg by mouth every morning.    XARELTO 15 mg Tab Take 15 mg by mouth every morning.       Review of patient's allergies indicates:  No Known Allergies    No past medical history on file.  Past Surgical History:   Procedure Laterality Date    MANDIBLE FRACTURE SURGERY       Family History    None       Tobacco Use    Smoking status: Current Every Day Smoker     Packs/day: 1.00     Types: Cigarettes    Smokeless tobacco: Current User     Types: Chew   Substance and Sexual Activity    Alcohol use: No    Drug use: Yes    Sexual activity: Not on file     Comment: Crack     Review of Systems   Constitutional:  Negative for activity change, appetite change, fatigue and fever.   HENT:  Negative for nosebleeds.    Respiratory:  Positive for shortness of breath. Negative for cough.    Cardiovascular:  Positive for chest pain. Negative for palpitations and leg swelling.   Gastrointestinal:  Negative for abdominal distention, abdominal pain and nausea.   Genitourinary:  Negative for frequency.   Musculoskeletal:  Negative for arthralgias and myalgias.   Skin:  Negative for rash.   Neurological:  Negative for dizziness and  numbness.   Hematological:  Does not bruise/bleed easily.   Objective:     Vital Signs (Most Recent):  Temp: 98.9 °F (37.2 °C) (06/17/22 0803)  Pulse: 66 (06/17/22 0803)  Resp: 12 (06/17/22 0803)  BP: 127/82 (06/17/22 0803)  SpO2: 100 % (06/17/22 0803) Vital Signs (24h Range):  Temp:  [98.3 °F (36.8 °C)-98.9 °F (37.2 °C)] 98.9 °F (37.2 °C)  Pulse:  [62-87] 66  Resp:  [12-23] 12  SpO2:  [99 %-100 %] 100 %  BP: (116-147)/(77-99) 127/82     Weight: 74.2 kg (163 lb 9.3 oz)  Body mass index is 24.87 kg/m².    SpO2: 100 %        Intake/Output - Last 3 Shifts       None             Lines/Drains/Airways       Peripheral Intravenous Line  Duration                  Peripheral IV - Single Lumen 06/16/22 1935 20 G Right Antecubital <1 day                      Physical Exam  HENT:      Head: Normocephalic and atraumatic.   Eyes:      Extraocular Movements: Extraocular movements intact.   Cardiovascular:      Rate and Rhythm: Normal rate and regular rhythm.      Heart sounds: Normal heart sounds.   Pulmonary:      Effort: Pulmonary effort is normal.      Breath sounds: Normal breath sounds.   Abdominal:      General: Abdomen is flat.      Palpations: Abdomen is soft.   Musculoskeletal:         General: Normal range of motion.      Cervical back: Normal range of motion.   Skin:     General: Skin is warm and dry.      Capillary Refill: Capillary refill takes less than 2 seconds.   Neurological:      General: No focal deficit present.       Significant Labs:  BMP:   Recent Labs   Lab 06/16/22 1930         K 3.6      CO2 22   BUN 17   CREATININE 1.05   CALCIUM 8.5     CBC:   Recent Labs   Lab 06/16/22 1930   WBC 4.90   RBC 3.34*   HGB 9.6*   HCT 30.2*   *   MCV 90   MCH 28.7   MCHC 31.8*     CMP:   Recent Labs   Lab 06/16/22 1930      CALCIUM 8.5   ALBUMIN 3.6   PROT 8.7*      K 3.6   CO2 22      BUN 17   CREATININE 1.05   ALKPHOS 115   ALT 45   *   BILITOT 3.0*     Coagulation:    Recent Labs   Lab 06/17/22  0914   INR 1.4*       Significant Diagnostics:  I have reviewed and interpreted all pertinent imaging results/findings within the past 24 hours.

## 2022-06-17 NOTE — CONSULTS
"[facility] - [facility] Care ([place] [address])  Cardiothoracic Surgery  Consult Note    Patient Name: Irving Linn  MRN: [id number]  Admission Date: 6/17/2022  Attending Physician: Jeremy Cheema MD  Referring Provider: Nat Lane MD    Patient information was obtained from patient, past medical records and ER records.     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 male with PMHx significant for HTN,, GERD and DVT on Xarelto admitted to hospital medicine as a transfer from [facility] ED for abnormal CT imagine. Patient presented to [facility] ED complaining of shortness of breath that has progressively worsened over the last 8 months. Labs were largely unremarkable.  Chest x-ray revealed no acute abnormalities did not appreciate any infiltrates. Ultrasound of bilateral lower extremities revealed a nonocclusive left distal superficial partial femoral DVT. CTA chest revealed bilateral small pleural effusions, no PE. CT scan abdomen pelvis with IV contrast revealed "apparent soft tissue density involving the cephalad portion of the inferior vena cava and extending into the inferior portion of the right atrium. This measures 6 x 4 by 11.2 cm in AP medial-lateral and craniocaudal dimensions respectively. There is a fluid collection in the inferior right lobe of the liver measuring 4.5 by 4.2 cm." Radiologist at outside facility felt that there may be a mass in the right atrium possibly a myxoma that is the source of the clot. 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 CTS recommended [place] [facility] [place] level of care.      On " arrival, patient reports minimal improvement of shortness of breath with supplemental oxygen. Endorses chest pain with deep breaths and unintentional weight loss over the last few months. Denies fever/chills, HA, cough, abdominal pain, n/v, diarrhea, constipation, urinary symptoms, weakness. AFVSS.       Current Facility-Administered Medications on File Prior to Encounter   Medication    [COMPLETED] iodixanoL (VISIPAQUE 320) injection 100 mL    [DISCONTINUED] albuterol inhaler 2 puff    [DISCONTINUED] amLODIPine tablet 5 mg    [DISCONTINUED] gabapentin capsule 300 mg    [DISCONTINUED] pantoprazole EC tablet 40 mg     Current Outpatient Medications on File Prior to Encounter   Medication Sig    albuterol (PROVENTIL/VENTOLIN HFA) 90 mcg/actuation inhaler Inhale 2 puffs into the lungs every 4 (four) hours as needed for Shortness of Breath.    amLODIPine (NORVASC) 5 MG tablet Take 5 mg by mouth every morning.    gabapentin (NEURONTIN) 300 MG capsule Take 300 mg by mouth 2 (two) times daily.    levoFLOXacin (LEVAQUIN) 750 MG tablet Take 750 mg by mouth once daily.    pantoprazole (PROTONIX) 40 MG tablet Take 40 mg by mouth every morning.    XARELTO 15 mg Tab Take 15 mg by mouth every morning.       Review of patient's allergies indicates:  No Known Allergies    No past medical history on file.  Past Surgical History:   Procedure Laterality Date    MANDIBLE FRACTURE SURGERY       Family History    None       Tobacco Use    Smoking status: Current Every Day Smoker     Packs/day: 1.00     Types: Cigarettes    Smokeless tobacco: Current User     Types: Chew   Substance and Sexual Activity    Alcohol use: No    Drug use: Yes    Sexual activity: Not on file     Comment: Crack     Review of Systems   Constitutional:  Negative for activity change, appetite change, fatigue and fever.   HENT:  Negative for nosebleeds.    Respiratory:  Positive for shortness of breath. Negative for cough.    Cardiovascular:  Positive for chest  pain. Negative for palpitations and leg swelling.   Gastrointestinal:  Negative for abdominal distention, abdominal pain and nausea.   Genitourinary:  Negative for frequency.   Musculoskeletal:  Negative for arthralgias and myalgias.   Skin:  Negative for rash.   Neurological:  Negative for dizziness and numbness.   Hematological:  Does not bruise/bleed easily.   Objective:     Vital Signs (Most Recent):  Temp: 98.9 °F (37.2 °C) (06/17/22 0803)  Pulse: 66 (06/17/22 0803)  Resp: 12 (06/17/22 0803)  BP: 127/82 (06/17/22 0803)  SpO2: 100 % (06/17/22 0803) Vital Signs (24h Range):  Temp:  [98.3 °F (36.8 °C)-98.9 °F (37.2 °C)] 98.9 °F (37.2 °C)  Pulse:  [62-87] 66  Resp:  [12-23] 12  SpO2:  [99 %-100 %] 100 %  BP: (116-147)/(77-99) 127/82     Weight: 74.2 kg (163 lb 9.3 oz)  Body mass index is 24.87 kg/m².    SpO2: 100 %        Intake/Output - Last 3 Shifts       None             Lines/Drains/Airways       Peripheral Intravenous Line  Duration                  Peripheral IV - Single Lumen 06/16/22 1935 20 G Right Antecubital <1 day                      Physical Exam  HENT:      Head: Normocephalic and atraumatic.   Eyes:      Extraocular Movements: Extraocular movements intact.   Cardiovascular:      Rate and Rhythm: Normal rate and regular rhythm.      Heart sounds: Normal heart sounds.   Pulmonary:      Effort: Pulmonary effort is normal.      Breath sounds: Normal breath sounds.   Abdominal:      General: Abdomen is flat.      Palpations: Abdomen is soft.   Musculoskeletal:         General: Normal range of motion.      Cervical back: Normal range of motion.   Skin:     General: Skin is warm and dry.      Capillary Refill: Capillary refill takes less than 2 seconds.   Neurological:      General: No focal deficit present.       Significant Labs:  BMP:   Recent Labs   Lab 06/16/22 1930         K 3.6      CO2 22   BUN 17   CREATININE 1.05   CALCIUM 8.5     CBC:   Recent Labs   Lab 06/16/22 1930   WBC  "4.90   RBC 3.34*   HGB 9.6*   HCT 30.2*   *   MCV 90   MCH 28.7   MCHC 31.8*     CMP:   Recent Labs   Lab 06/16/22  1930      CALCIUM 8.5   ALBUMIN 3.6   PROT 8.7*      K 3.6   CO2 22      BUN 17   CREATININE 1.05   ALKPHOS 115   ALT 45   *   BILITOT 3.0*     Coagulation:   Recent Labs   Lab 06/17/22  0914   INR 1.4*       Significant Diagnostics:  I have reviewed and interpreted all pertinent imaging results/findings within the past 24 hours.      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 male with PMHx significant for HTN,, GERD and DVT on Xarelto admitted to hospital medicine as a transfer from [facility] ED for abnormal CT imagine. Patient presented to [facility] ED complaining of shortness of breath that has progressively worsened over the last 8 months. Labs were largely unremarkable.  Chest x-ray revealed no acute abnormalities did not appreciate any infiltrates. Ultrasound of bilateral lower extremities revealed a nonocclusive left distal superficial partial femoral DVT. CTA chest revealed bilateral small pleural effusions, no PE. CT scan abdomen pelvis with IV contrast revealed "apparent soft tissue density involving the cephalad portion of the inferior vena cava and extending into the inferior portion of the right atrium. This measures 6 x 4 by 11.2 cm in AP medial-lateral and craniocaudal dimensions respectively. There is a fluid collection in the inferior right lobe of the liver measuring 4.5 by 4.2 cm." Radiologist at outside facility felt that there may be a mass in the right atrium possibly a myxoma that is the source of the clot. 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 CTS recommended transfer to [facility] for higher level of care.      Will discuss case with Dr. Pompa who will give recommendations. "         Thank you for your consult. I will follow-up with patient. Please contact us if you have any additional questions.    Trina Shelby NP  Cardiothoracic Surgery  [facility] - Intensive Care ([place])      I have seen the patient and reviewed the nurse practitioner's note above. I have personally interviewed and examined the patient at bedside and agree with the findings.     Given the CT scan images, it appears this mass could be arising more from the liver and traveling into the right atrium via the IVC rather than arising from the right atrium as a primary atrial tumor.  With this in mind, malignancy is of high concern.  I recommend hematology/oncology consultation and possible biopsy of the mass (either the liver mass via interventional radiology or the right atrial mass via cardiology using the heart transplant transcatheter biopsy devices).      Jerrod Pompa MD  Cardiothoracic Surgery  [facility]

## 2022-06-17 NOTE — HPI
"Irving Linn is a 61 y.o. male with PMHx significant for HTN,, GERD and DVT on Xarelto admitted to hospital medicine as a transfer from [facility] ED for abnormal CT imagine. Patient presented to [facility] ED complaining of shortness of breath that has progressively worsened over the last 8 months. Labs were largely unremarkable.  Chest x-ray revealed no acute abnormalities did not appreciate any infiltrates. Ultrasound of bilateral lower extremities revealed a nonocclusive left distal superficial partial femoral DVT. CTA chest revealed bilateral small pleural effusions, no PE. CT scan abdomen pelvis with IV contrast revealed "apparent soft tissue density involving the cephalad portion of the inferior vena cava and extending into the inferior portion of the right atrium. This measures 6 x 4 by 11.2 cm in AP medial-lateral and craniocaudal dimensions respectively. There is a fluid collection in the inferior right lobe of the liver measuring 4.5 by 4.2 cm." Radiologist at outside facility felt that there may be a mass in the right atrium possibly a myxoma that is the source of the clot. 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 CTS recommended transfer to [facility] for higher level of care.     On arrival, patient reports minimal improvement of shortness of breath with supplemental oxygen. Endorses chest pain with deep breaths and unintentional weight loss over the last few months. Denies fever/chills, HA, cough, abdominal pain, n/v, diarrhea, constipation, urinary symptoms, weakness. AFVSS.   "

## 2022-06-17 NOTE — H&P
"[facility] - Intensive Care ([address] Medicine  History & Physical    Patient Name: Irving Linn  MRN: [id number]  Patient Class: IP- Inpatient  Admission Date: 6/17/2022  Attending Physician: Jeremy Cheema MD   Primary Care Provider: Primary Doctor No         Patient information was obtained from patient, past medical records and ER records.     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       HPI: Irving Linn is a 61 y.o. male with PMHx significant for HTN,, GERD and DVT on Xarelto admitted to hospital medicine as a transfer from [facility] ED for abnormal CT imagine. Patient presented to [facility] ED complaining of shortness of breath that has progressively worsened over the last 8 months. Labs were largely unremarkable.  Chest x-ray revealed no acute abnormalities did not appreciate any infiltrates. Ultrasound of bilateral lower extremities revealed a nonocclusive left distal superficial partial femoral DVT. CTA chest revealed bilateral small pleural effusions, no PE. CT scan abdomen pelvis with IV contrast revealed "apparent soft tissue density involving the cephalad portion of the inferior vena cava and extending into the inferior portion of the right atrium. This measures 6 x 4 by 11.2 cm in AP medial-lateral and craniocaudal dimensions respectively. There is a fluid collection in the inferior right lobe of the liver measuring 4.5 by 4.2 cm." Radiologist at outside facility felt that there may be a mass in the right atrium possibly a myxoma that is the source of the clot. 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 CTS recommended transfer to [facility] for higher level of care.     On arrival, patient reports minimal improvement of shortness of breath with supplemental oxygen. Endorses chest pain with " deep breaths and unintentional weight loss over the last few months. Denies fever/chills, HA, cough, abdominal pain, n/v, diarrhea, constipation, urinary symptoms, weakness. AFVSS.       No past medical history on file.    Past Surgical History:   Procedure Laterality Date    MANDIBLE FRACTURE SURGERY         Review of patient's allergies indicates:  No Known Allergies    Current Facility-Administered Medications on File Prior to Encounter   Medication    [COMPLETED] iodixanoL (VISIPAQUE 320) injection 100 mL    [DISCONTINUED] albuterol inhaler 2 puff    [DISCONTINUED] amLODIPine tablet 5 mg    [DISCONTINUED] gabapentin capsule 300 mg    [DISCONTINUED] pantoprazole EC tablet 40 mg     Current Outpatient Medications on File Prior to Encounter   Medication Sig    albuterol (PROVENTIL/VENTOLIN HFA) 90 mcg/actuation inhaler Inhale 2 puffs into the lungs every 4 (four) hours as needed for Shortness of Breath.    amLODIPine (NORVASC) 5 MG tablet Take 5 mg by mouth every morning.    gabapentin (NEURONTIN) 300 MG capsule Take 300 mg by mouth 2 (two) times daily.    levoFLOXacin (LEVAQUIN) 750 MG tablet Take 750 mg by mouth once daily.    pantoprazole (PROTONIX) 40 MG tablet Take 40 mg by mouth every morning.    XARELTO 15 mg Tab Take 15 mg by mouth every morning.     Family History    None       Tobacco Use    Smoking status: Current Every Day Smoker     Packs/day: 1.00     Types: Cigarettes    Smokeless tobacco: Current User     Types: Chew   Substance and Sexual Activity    Alcohol use: No    Drug use: Yes    Sexual activity: Not on file     Comment: Crack     Review of Systems   Constitutional:  Negative for activity change, chills and fever.   HENT:  Negative for rhinorrhea, sinus pressure and trouble swallowing.    Eyes:  Negative for photophobia and visual disturbance.   Respiratory:  Positive for shortness of breath. Negative for chest tightness and wheezing.    Cardiovascular:  Positive for chest  pain. Negative for palpitations and leg swelling.   Gastrointestinal:  Negative for abdominal pain, constipation, diarrhea, nausea and vomiting.   Genitourinary:  Negative for dysuria, frequency, hematuria and urgency.   Musculoskeletal:  Negative for arthralgias, back pain and gait problem.   Skin:  Negative for color change and rash.   Neurological:  Negative for dizziness, syncope, weakness, light-headedness, numbness and headaches.   Psychiatric/Behavioral:  Negative for agitation and confusion. The patient is not nervous/anxious.    Objective:     Vital Signs (Most Recent):  Temp: 98.9 °F (37.2 °C) (06/17/22 0803)  Pulse: 66 (06/17/22 0803)  Resp: 12 (06/17/22 0803)  BP: 127/82 (06/17/22 0803)  SpO2: 100 % (06/17/22 0803) Vital Signs (24h Range):  Temp:  [98.3 °F (36.8 °C)-98.9 °F (37.2 °C)] 98.9 °F (37.2 °C)  Pulse:  [62-87] 66  Resp:  [12-23] 12  SpO2:  [99 %-100 %] 100 %  BP: (116-147)/(77-99) 127/82     Weight: 74.2 kg (163 lb 9.3 oz)  Body mass index is 24.87 kg/m².    Physical Exam  Vitals and nursing note reviewed.   Constitutional:       General: He is not in acute distress.     Appearance: Normal appearance. He is not ill-appearing.   HENT:      Head: Normocephalic and atraumatic.      Right Ear: External ear normal.      Left Ear: External ear normal.   Eyes:      Extraocular Movements: Extraocular movements intact.      Conjunctiva/sclera: Conjunctivae normal.      Pupils: Pupils are equal, round, and reactive to light.   Cardiovascular:      Rate and Rhythm: Normal rate and regular rhythm.      Pulses: Normal pulses.      Heart sounds: Normal heart sounds. No murmur heard.  Pulmonary:      Effort: Pulmonary effort is normal. No respiratory distress.      Breath sounds: Normal breath sounds.      Comments: Breathing comfortably on 2L NC  Abdominal:      General: Abdomen is flat. Bowel sounds are normal.      Tenderness: There is no abdominal tenderness.   Musculoskeletal:         General: Normal  range of motion.      Cervical back: Normal range of motion and neck supple.      Right lower leg: No edema.      Left lower leg: No edema.   Skin:     General: Skin is warm and dry.      Capillary Refill: Capillary refill takes less than 2 seconds.      Coloration: Skin is not jaundiced.   Neurological:      General: No focal deficit present.      Mental Status: He is alert and oriented to person, place, and time. Mental status is at baseline.      Cranial Nerves: No cranial nerve deficit.   Psychiatric:         Mood and Affect: Mood normal.         Behavior: Behavior normal.         CRANIAL NERVES     CN III, IV, VI   Pupils are equal, round, and reactive to light.     Significant Labs: All pertinent labs within the past 24 hours have been reviewed.  BMP:   Recent Labs   Lab 06/16/22 1930         K 3.6      CO2 22   BUN 17   CREATININE 1.05   CALCIUM 8.5     CBC:   Recent Labs   Lab 06/16/22 1930   WBC 4.90   HGB 9.6*   HCT 30.2*   *     CMP:   Recent Labs   Lab 06/16/22 1930      K 3.6      CO2 22      BUN 17   CREATININE 1.05   CALCIUM 8.5   PROT 8.7*   ALBUMIN 3.6   BILITOT 3.0*   ALKPHOS 115   *   ALT 45   ANIONGAP 8   EGFRNONAA >60       Significant Imaging: I have reviewed all pertinent imaging results/findings within the past 24 hours.    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 This measures 6 x 4 by 11.2 cm in AP medial-lateral and craniocaudal dimensions respectively. There is a fluid collection in the inferior right lobe of the liver measuring 4.5 by 4.2 cm.  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  - Cardiothoracic surgery consulted, recommend IR consult for  possible guided biopsy of liver mass prior to other recommendation  - IR consulted, appreciate recs  - Of note, patient on levofloxacin for presumed PNA till 6/19/22, will complete  - keep NPO for now    GERD (gastroesophageal reflux disease)  - continue home protonix     HTN (hypertension)  - controlled on admit  - continue amlodipine 5mg daily    Chronic deep vein thrombosis (DVT) of femoral vein  Leg pain   - on Xarelto 15mg daily at home, reports last dose was 6/16  - will hold for now till evaluated by CTS for possible heparin gtt  - continue home gabapentin 300mg BID      VTE Risk Mitigation (From admission, onward)         Ordered     Reason for No Pharmacological VTE Prophylaxis  Once        Question:  Reasons:  Answer:  Already adequately anticoagulated on oral Anticoagulants    06/17/22 0828     IP VTE HIGH RISK PATIENT  Once         06/17/22 0828     Place sequential compression device  Until discontinued         06/17/22 0828                   JERED ResendezC  Department of Hospital Medicine   [facility] - Int[facility] Care ([place])

## 2022-06-17 NOTE — ASSESSMENT & PLAN NOTE
"Irving Linn is a 61 y.o. male with PMHx significant for HTN,, GERD and DVT on Xarelto admitted to hospital medicine as a transfer from [facility] ED for abnormal CT imagine. Patient presented to [facility] ED complaining of shortness of breath that has progressively worsened over the last 8 months. Labs were largely unremarkable.  Chest x-ray revealed no acute abnormalities did not appreciate any infiltrates. Ultrasound of bilateral lower extremities revealed a nonocclusive left distal superficial partial femoral DVT. CTA chest revealed bilateral small pleural effusions, no PE. CT scan abdomen pelvis with IV contrast revealed "apparent soft tissue density involving the cephalad portion of the inferior vena cava and extending into the inferior portion of the right atrium. This measures 6 x 4 by 11.2 cm in AP medial-lateral and craniocaudal dimensions respectively. There is a fluid collection in the inferior right lobe of the liver measuring 4.5 by 4.2 cm." Radiologist at outside facility felt that there may be a mass in the right atrium possibly a myxoma that is the source of the clot. 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 CTS recommended transfer to [facility] for higher level of care.      Will discuss case with Dr. Pompa who will give recommendations.   "

## 2022-06-17 NOTE — ASSESSMENT & PLAN NOTE
- presented to outside facility complaining of shortness of breath ~8 months  - CT A/p showed apparent soft tissue density involving the cephalad portion of the inferior vena cava and extending into the inferior portion of the right atrium. This measures 6 x 4 by 11.2 cm in AP medial-lateral and craniocaudal dimensions respectively. There is a fluid collection in the inferior right lobe of the liver measuring 4.5 by 4.2 cm.  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  - Cardiothoracic surgery consulted, appreciate recs  - Of note, patient on levofloxacin for presumed PNA till 6/19/22, will complete  - keep NPO for now

## 2022-06-17 NOTE — NURSING
Pt still endorsing shortness of breath, worse with deep inspiration. Also complains of fatigue and midsternal deep chest pain which is constant and not new onset. Pt on room air with SpO2 in high 90s. Tolerating food, voiding bladder, ambulatory in room with minimal assistance. MRI completed earlier in this shift. Pt very pleasant and A&O x4.

## 2022-06-18 NOTE — SUBJECTIVE & OBJECTIVE
Oncology Treatment Plan:   [Could not find a treatment plan. This SmartLink may be configured incorrectly. Contact a  for help.]    Medications:  Continuous Infusions:  Scheduled Meds:   amLODIPine  5 mg Oral QAM    gabapentin  300 mg Oral BID    pantoprazole  40 mg Oral QAM     PRN Meds:acetaminophen, albuterol-ipratropium, bisacodyL, dextrose 10%, dextrose 10%, glucagon (human recombinant), glucose, glucose, melatonin, ondansetron, oxyCODONE-acetaminophen, oxyCODONE-acetaminophen, polyethylene glycol, sodium chloride 0.9%     Review of patient's allergies indicates:  No Known Allergies     No past medical history on file.  Past Surgical History:   Procedure Laterality Date    MANDIBLE FRACTURE SURGERY       Family History    None       Tobacco Use    Smoking status: Current Every Day Smoker     Packs/day: 1.00     Types: Cigarettes    Smokeless tobacco: Current User     Types: Chew   Substance and Sexual Activity    Alcohol use: No    Drug use: Yes    Sexual activity: Not on file     Comment: Crack       Review of Systems  Patient reports shortness of breath, weight loss   Objective:     Vital Signs (Most Recent):  Temp: 97.8 °F (36.6 °C) (06/18/22 1525)  Pulse: 79 (06/18/22 1525)  Resp: 18 (06/18/22 1827)  BP: 111/73 (06/18/22 1525)  SpO2: (!) 94 % (06/18/22 1525)   Vital Signs (24h Range):  Temp:  [97.7 °F (36.5 °C)-99.1 °F (37.3 °C)] 97.8 °F (36.6 °C)  Pulse:  [] 79  Resp:  [16-31] 18  SpO2:  [93 %-98 %] 94 %  BP: (111-140)/(73-81) 111/73     Weight: 73.9 kg (163 lb)  Body mass index is 24.78 kg/m².  Body surface area is 1.88 meters squared.    No intake or output data in the 24 hours ending 06/18/22 1853    Physical Exam  Alert awake oriented x3  Regular rate and rhythm  Lungs clear to auscultation bilaterally  Abdomen distended and tender  No lower extremity edema    Significant Labs:   All pertinent labs from the last 24 hours have been reviewed.    Diagnostic Results:  I have  reviewed all pertinent imaging results/findings within the past 24 hours.

## 2022-06-18 NOTE — CONSULTS
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 Patient presented for SOB.    CT A/P w/ 11 cm soft tissue density involving IVC extending into right atrium.  Rounded lesion in the inferior right lobe of the liver.      MRI abdomen/pelvis:  Nodular contour suggestive of cirrhosis.  Soft tissue mass in the IVC extending into the right atrium.  Approximately 7.6 cm in greatest dimension.  Mass extends into and occludes the right hepatic vein.  Mass in the inferior right hepatic lobe.            Medications:  Continuous Infusions:  Scheduled Meds:   amLODIPine  5 mg Oral QAM    gabapentin  300 mg Oral BID    pantoprazole  40 mg Oral QAM     PRN Meds:acetaminophen, albuterol-ipratropium, bisacodyL, dextrose 10%, dextrose 10%, glucagon (human recombinant), glucose, glucose, melatonin, ondansetron, oxyCODONE-acetaminophen, oxyCODONE-acetaminophen, polyethylene glycol, sodium chloride 0.9%     Review of patient's allergies indicates:  No Known Allergies     No past medical history on file.  Past Surgical History:   Procedure Laterality Date    MANDIBLE FRACTURE SURGERY       Family History    None       Tobacco Use    Smoking status: Current Every Day Smoker     Packs/day: 1.00     Types: Cigarettes    Smokeless tobacco: Current User     Types: Chew   Substance and Sexual Activity    Alcohol use: No    Drug use: Yes    Sexual activity: Not on file     Comment: Crack       Review of  Systems  Reports SOB, chest pain  Objective:     Vital Signs (Most Recent):  Temp: 98 °F (36.7 °C) (06/18/22 1115)  Pulse: 72 (06/18/22 1239)  Resp: 17 (06/18/22 1115)  BP: 120/79 (06/18/22 1239)  SpO2: 96 % (06/18/22 1115) Vital Signs (24h Range):  Temp:  [97.7 °F (36.5 °C)-99.1 °F (37.3 °C)] 98 °F (36.7 °C)  Pulse:  [] 72  Resp:  [16-31] 17  SpO2:  [93 %-99 %] 96 %  BP: (120-148)/(75-87) 120/79     Weight: 73.9 kg (163 lb)  Body mass index is 24.78 kg/m².  Body surface area is 1.88 meters squared.    No intake or output data in the 24 hours ending 06/18/22 1443    Physical Exam  Alert awake oriented x3  Regular rate and rhythm  Lungs clear to auscultation bilaterally  Abdomen soft nontender  No lower extremity edema      Significant Labs:   All pertinent labs from the last 24 hours have been reviewed.    Diagnostic Results:  I have reviewed all pertinent imaging results/findings within the past 24 hours.    Assessment/Plan:     Cirrhosis  Hepatic mass in the inferior right hepatic lobe. Soft tissue density in the IVC.     Patient's mass is concerning for atypical HCC with tumor thrombus but can not definitively say this. Agree with obtaining AFP, hepatitis serologies, auto-immune panel. If AFP not elevated w/ atypical imaging will likely need biopsy. Would not recommend anticoagulation for tumor thrombus. May need IR for prophylactic stent placement of IVC given near occlusion of RA.     Case will be discussed at IR tumor board.     Thank you for your consult. I will follow-up with patient. Please contact us if you have any additional questions.    Yady Whitmore MD  Hematology/Oncology Fellow PGY IV  [facility]

## 2022-06-18 NOTE — CONSULTS
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 Patient presented for SOB.    Patient reports worsening shortness of breath, weight loss 467 months.  He currently is living with his nephew.  He reports that he previously used to drink 1 case of beer a T for 30 years, quit 1 year ago.  Also quit smoking 1 year ago.  He reports weakness and shortness of breath with exertion.    CT A/P w/ 11 cm soft tissue density involving IVC extending into right atrium.  Rounded lesion in the inferior right lobe of the liver.      MRI abdomen/pelvis:  Nodular contour suggestive of cirrhosis.  Soft tissue mass in the IVC extending into the right atrium.  Approximately 7.6 cm in greatest dimension.  Mass extends into and occludes the right hepatic vein.  Mass in the inferior right hepatic lobe.      Oncology Treatment Plan:   [Could not find a treatment plan. This SmartLink may be configured incorrectly. Contact a  for help.]    Medications:  Continuous Infusions:  Scheduled Meds:   amLODIPine  5 mg Oral QAM    gabapentin  300 mg Oral BID    pantoprazole  40 mg Oral QAM     PRN Meds:acetaminophen, albuterol-ipratropium, bisacodyL, dextrose 10%, dextrose 10%, glucagon (human recombinant), glucose, glucose, melatonin, ondansetron, oxyCODONE-acetaminophen,  oxyCODONE-acetaminophen, polyethylene glycol, sodium chloride 0.9%     Review of patient's allergies indicates:  No Known Allergies     No past medical history on file.  Past Surgical History:   Procedure Laterality Date    MANDIBLE FRACTURE SURGERY       Family History    None       Tobacco Use    Smoking status: Current Every Day Smoker     Packs/day: 1.00     Types: Cigarettes    Smokeless tobacco: Current User     Types: Chew   Substance and Sexual Activity    Alcohol use: No    Drug use: Yes    Sexual activity: Not on file     Comment: Crack       Review of Systems  Patient reports shortness of breath, weight loss   Objective:     Vital Signs (Most Recent):  Temp: 97.8 °F (36.6 °C) (06/18/22 1525)  Pulse: 79 (06/18/22 1525)  Resp: 18 (06/18/22 1827)  BP: 111/73 (06/18/22 1525)  SpO2: (!) 94 % (06/18/22 1525)   Vital Signs (24h Range):  Temp:  [97.7 °F (36.5 °C)-99.1 °F (37.3 °C)] 97.8 °F (36.6 °C)  Pulse:  [] 79  Resp:  [16-31] 18  SpO2:  [93 %-98 %] 94 %  BP: (111-140)/(73-81) 111/73     Weight: 73.9 kg (163 lb)  Body mass index is 24.78 kg/m².  Body surface area is 1.88 meters squared.    No intake or output data in the 24 hours ending 06/18/22 1853    Physical Exam  Alert awake oriented x3  Regular rate and rhythm  Lungs clear to auscultation bilaterally  Abdomen distended and tender  No lower extremity edema    Significant Labs:   All pertinent labs from the last 24 hours have been reviewed.    Diagnostic Results:  I have reviewed all pertinent imaging results/findings within the past 24 hours.    Assessment/Plan:     Cirrhosis  Hepatic mass in the inferior right hepatic lobe. Soft tissue density in the IVC.     Patient's mass is concerning for atypical HCC with tumor thrombus but can not definitively say this. Agree with obtaining AFP, hepatitis serologies, auto-immune panel. If AFP not elevated w/ atypical imaging will likely need biopsy. Would not recommend anticoagulation for tumor thrombus.  May need IR for prophylactic stent placement of IVC given near occlusion of RA.     Case will be discussed at IR tumor board.     Thank you for your consult. I will follow-up with patient. Please contact us if you have any additional questions.    Yady Whitmore MD  Hematology/Oncology Fellow PGY IV  [facility]

## 2022-06-18 NOTE — CONSULTS
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 male with history of hypertension, DVT on Xarelto presents to the hospital as a transfer from [place] for abnormal CT imaging.  Initially presented there for shortness of breath.  No obvious cause is found on chest x-ray, he was found to have a superficial partial femoral DVT on ultrasound. CT scan abdomen pelvis with IV contrast revealed "apparent soft tissue density involving the cephalad portion of the inferior vena cava and extending into the inferior portion of the right atrium. This measures 6 x 4 by 11.2 cm in AP medial-lateral and craniocaudal dimensions respectively. There is a fluid collection in the inferior right lobe of the liver measuring 4.5 by 4.2 cm." He was transferred here for further evaluation.    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    The patient denies any prior knowledge of liver disease.  Reports that he was a heavy alcohol drinker, drinking beer daily for many years, quit about a year ago.  Reports ongoing shortness of breath and chest pain.    Hemodynamically stable, labs notable for Hgb 8.8, platelets 97, INR 1.4, sodium 134, creatinine 0.8, to bili 2.7, AST 74 ALT 31.       No past medical history on file.    Past Surgical History:   Procedure Laterality Date    MANDIBLE FRACTURE SURGERY   "       No family history on file.    Social History     Socioeconomic History    Marital status: Single   Tobacco Use    Smoking status: Current Every Day Smoker     Packs/day: 1.00     Types: Cigarettes    Smokeless tobacco: Current User     Types: Chew   Substance and Sexual Activity    Alcohol use: No    Drug use: Yes       No current facility-administered medications on file prior to encounter.     Current Outpatient Medications on File Prior to Encounter   Medication Sig Dispense Refill    albuterol (PROVENTIL/VENTOLIN HFA) 90 mcg/actuation inhaler Inhale 2 puffs into the lungs every 4 (four) hours as needed for Shortness of Breath.      amLODIPine (NORVASC) 5 MG tablet Take 5 mg by mouth every morning.      gabapentin (NEURONTIN) 300 MG capsule Take 300 mg by mouth 2 (two) times daily.      levoFLOXacin (LEVAQUIN) 750 MG tablet Take 750 mg by mouth once daily.      pantoprazole (PROTONIX) 40 MG tablet Take 40 mg by mouth every morning.      XARELTO 15 mg Tab Take 15 mg by mouth every morning.         Review of patient's allergies indicates:  No Known Allergies    Review of Systems   Constitutional: Negative.    HENT: Negative.    Eyes: Negative.    Respiratory: Positive for shortness of breath.    Cardiovascular: Positive for chest pain.   Gastrointestinal: Negative for abdominal pain, diarrhea, nausea and vomiting.   Genitourinary: Negative.    Musculoskeletal: Negative.    Skin: Negative.    Neurological: Negative.    Psychiatric/Behavioral: Negative.         Objective:     Vitals:    06/18/22 0933   BP:    Pulse:    Resp: 20   Temp:          Constitutional:  not in acute distress and well developed  HENT: Head: Normal, normocephalic, atraumatic.  Eyes: conjunctiva clear and sclera nonicteric  Cardiovascular: regular rate and rhythm  Respiratory: normal chest expansion & respiratory effort   and no accessory muscle use  GI: soft, non-tender, without masses or organomegaly  Musculoskeletal: no  muscular tenderness noted  Skin: normal color  Neurological: alert, oriented x3  Psychiatric: mood and affect are within normal limits, pt is a good historian; no memory problems were noted    Significant Labs:  Recent Labs   Lab 06/16/22  1930 06/18/22  1041   HGB 9.6* 8.8*       Lab Results   Component Value Date    WBC 3.45 (L) 06/18/2022    HGB 8.8 (L) 06/18/2022    HCT 27.0 (L) 06/18/2022    MCV 91 06/18/2022    PLT 97 (L) 06/18/2022       Lab Results   Component Value Date     (L) 06/18/2022    K 3.8 06/18/2022     06/18/2022    CO2 21 (L) 06/18/2022    BUN 13 06/18/2022    CREATININE 0.8 06/18/2022    CALCIUM 8.4 (L) 06/18/2022    ANIONGAP 7 (L) 06/18/2022    ESTGFRAFRICA >60.0 06/18/2022    EGFRNONAA >60.0 06/18/2022       Lab Results   Component Value Date    ALT 31 06/18/2022    AST 74 (H) 06/18/2022    ALKPHOS 120 06/18/2022    BILITOT 2.7 (H) 06/18/2022       Lab Results   Component Value Date    INR 1.4 (H) 06/17/2022       Significant Imaging:  Reviewed pertinent radiology findings.       Assessment/Plan:     Irving Linn is a 61 y.o. male with history of hypertension, DVT on Xarelto presents to the hospital as a transfer from [place] for abnormal CT imaging. Hepatology consulted for cirrhosis, liver mass.    Problem List:  1. Cirrhosis  2. Liver mass  3. IVC thrombus      Assessment:  The patient has newly diagnosed cirrhosis, likely due to alcoholic liver disease, however would recommend obtaining serologies to rule out other causes of liver disease.  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  Recommended obtaining tumor markers and if AFP is elevated this is likely HCC.  If unable to diagnosed by imaging and serology, might require liver biopsy.  We will discuss his imaging at our multidisciplinary IR conference next week.  As far as his thrombosis, would defer to Hematology in regards to  anticoagulation.  He might have esophageal varices, however it would be difficult to sedate  him for EGD with the cardiac Mccartney and IVC thrombus.    Recommendations:  - please obtain AFP, CEA, CA 19 9  - please obtain acute hepatitis panel, JOHNY, anti smooth muscle antibody, antimitochondrial antibody, total IgG, alpha-1 antitrypsin phenotype, ceruloplasmin, PETH if not done so already  - AC per hematology  - we will discuss his imaging at IR conference next week    Thank you for involving us in the care of Irving Linn. Please call with any additional questions, concerns or changes in the patient's clinical status. We will continue to follow.    Niecy Penny MD  Gastroenterology & Hepatology Fellow PGY V   [facility]

## 2022-06-18 NOTE — SUBJECTIVE & OBJECTIVE
Medications:  Continuous Infusions:  Scheduled Meds:   amLODIPine  5 mg Oral QAM    gabapentin  300 mg Oral BID    pantoprazole  40 mg Oral QAM     PRN Meds:acetaminophen, albuterol-ipratropium, bisacodyL, dextrose 10%, dextrose 10%, glucagon (human recombinant), glucose, glucose, melatonin, ondansetron, oxyCODONE-acetaminophen, oxyCODONE-acetaminophen, polyethylene glycol, sodium chloride 0.9%     Review of patient's allergies indicates:  No Known Allergies     No past medical history on file.  Past Surgical History:   Procedure Laterality Date    MANDIBLE FRACTURE SURGERY       Family History    None       Tobacco Use    Smoking status: Current Every Day Smoker     Packs/day: 1.00     Types: Cigarettes    Smokeless tobacco: Current User     Types: Chew   Substance and Sexual Activity    Alcohol use: No    Drug use: Yes    Sexual activity: Not on file     Comment: Crack       Review of Systems  Reports SOB, chest pain  Objective:     Vital Signs (Most Recent):  Temp: 98 °F (36.7 °C) (06/18/22 1115)  Pulse: 72 (06/18/22 1239)  Resp: 17 (06/18/22 1115)  BP: 120/79 (06/18/22 1239)  SpO2: 96 % (06/18/22 1115) Vital Signs (24h Range):  Temp:  [97.7 °F (36.5 °C)-99.1 °F (37.3 °C)] 98 °F (36.7 °C)  Pulse:  [] 72  Resp:  [16-31] 17  SpO2:  [93 %-99 %] 96 %  BP: (120-148)/(75-87) 120/79     Weight: 73.9 kg (163 lb)  Body mass index is 24.78 kg/m².  Body surface area is 1.88 meters squared.    No intake or output data in the 24 hours ending 06/18/22 1443    Physical Exam  Alert awake oriented x3  Regular rate and rhythm  Lungs clear to auscultation bilaterally  Abdomen soft nontender  No lower extremity edema      Significant Labs:   All pertinent labs from the last 24 hours have been reviewed.    Diagnostic Results:  I have reviewed all pertinent imaging results/findings within the past 24 hours.

## 2022-06-18 NOTE — HPI
Patient is a 61-year-old male with history of HTN, DVT on Xarelto who presents to the hospital as a transfer from [place] for abnormal CT imaging. Patient presented for SOB.    Patient reports worsening shortness of breath, weight loss 467 months.  He currently is living with his nephew.  He reports that he previously used to drink 1 case of beer a T for 30 years, quit 1 year ago.  Also quit smoking 1 year ago.  He reports weakness and shortness of breath with exertion.    CT A/P w/ 11 cm soft tissue density involving IVC extending into right atrium.  Rounded lesion in the inferior right lobe of the liver.      MRI abdomen/pelvis:  Nodular contour suggestive of cirrhosis.  Soft tissue mass in the IVC extending into the right atrium.  Approximately 7.6 cm in greatest dimension.  Mass extends into and occludes the right hepatic vein.  Mass in the inferior right hepatic lobe.

## 2022-06-18 NOTE — PLAN OF CARE
PATIENT AAOX4, SLEPT THROUGH THE NIGHT.  PATIENT STATES HE HAD DIFFICULTY SLEEPING THROUGH THE NIGHT.  PATIENT RECEIVED PRN MELATONIN AND SLEPT THROUGH OUT THE NIGHT WITHOUT COMPLAINTS.  NAD NOTED, CALL LIGHT WITHIN REACH, BED IN LOW POSITION, WALK WAY CLEAR.  Problem: Adult Inpatient Plan of Care  Goal: Plan of Care Review  Outcome: Ongoing, Progressing  Goal: Patient-Specific Goal (Individualized)  Outcome: Ongoing, Progressing  Goal: Readiness for Transition of Care  Outcome: Ongoing, Progressing     Problem: Fall Injury Risk  Goal: Absence of Fall and Fall-Related Injury  Outcome: Ongoing, Progressing

## 2022-06-18 NOTE — RESPIRATORY THERAPY
RAPID RESPONSE RESPIRATORY CHART CHECK       Chart check completed, instructed to call [phone] for further concerns or assistance.

## 2022-06-19 PROBLEM — K74.60 CIRRHOSIS: Status: ACTIVE | Noted: 2022-01-01

## 2022-06-19 NOTE — ASSESSMENT & PLAN NOTE
Liver Mass  - presented to outside facility complaining of shortness of breath ~8 months  - CT A/p showed apparent soft tissue density involving the cephalad portion of the inferior vena cava and extending into the inferior portion of the right atrium. This measures 6 x 4 by 11.2 cm in AP medial-lateral and craniocaudal dimensions respectively. There is a fluid collection in the inferior right lobe of the liver measuring 4.5 by 4.2 cm.  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 We can biopsy the liver lesion in segment 5.  We are unable to biopsy the lesion extending to the right atrium.   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  -- elevated AFP and CEA  -- Would not recommend anticoagulation for tumor thrombus. May need IR for prophylactic stent placement of IVC given near occlusion of RA.   -- case will be discussed in tumor board

## 2022-06-19 NOTE — SUBJECTIVE & OBJECTIVE
Interval History: Patient lying in bed, no acute distress. No acute events overnight. Patient reports chest pain and SOB improving. No new complaints. Reports good UOP, regular bowel movements and good po intake. Malignancy and cirrhosis workup pending. Elevated AFP and CEA. Stopped AC per heme/onc. Case will be discussed in IR conference and tumor board in the next week.       Review of Systems   Constitutional:  Negative for activity change, chills, fatigue and fever.   HENT:  Negative for congestion and trouble swallowing.    Eyes:  Negative for visual disturbance.   Respiratory:  Positive for shortness of breath. Negative for cough.    Cardiovascular:  Positive for chest pain. Negative for leg swelling.   Gastrointestinal:  Negative for abdominal distention, abdominal pain, nausea and vomiting.   Endocrine: Negative for cold intolerance, heat intolerance, polydipsia and polyuria.   Genitourinary:  Negative for difficulty urinating and dysuria.   Musculoskeletal:  Negative for back pain and myalgias.   Skin:  Negative for rash and wound.   Neurological:  Negative for dizziness, weakness and light-headedness.   Hematological:  Negative for adenopathy. Does not bruise/bleed easily.   Psychiatric/Behavioral:  Negative for confusion and sleep disturbance.      Objective:     Vital Signs (Most Recent):  Temp: 98.8 °F (37.1 °C) (06/19/22 0745)  Pulse: 77 (06/19/22 0745)  Resp: 18 (06/19/22 0852)  BP: 117/69 (06/19/22 0745)  SpO2: 95 % (06/19/22 0745) Vital Signs (24h Range):  Temp:  [97.8 °F (36.6 °C)-99 °F (37.2 °C)] 98.8 °F (37.1 °C)  Pulse:  [72-90] 77  Resp:  [17-39] 18  SpO2:  [92 %-96 %] 95 %  BP: (111-143)/(68-82) 117/69     Weight: 73.9 kg (163 lb)  Body mass index is 24.78 kg/m².    Intake/Output Summary (Last 24 hours) at 6/19/2022 1000  Last data filed at 6/19/2022 0600  Gross per 24 hour   Intake 250 ml   Output --   Net 250 ml        Physical Exam  Constitutional:       Appearance: He is well-developed.    HENT:      Head: Normocephalic and atraumatic.   Eyes:      General: No scleral icterus.     Pupils: Pupils are equal, round, and reactive to light.   Neck:      Vascular: No JVD.   Cardiovascular:      Rate and Rhythm: Normal rate and regular rhythm.      Heart sounds: No murmur heard.    No friction rub. No gallop.   Pulmonary:      Effort: Pulmonary effort is normal. No respiratory distress.      Breath sounds: Normal breath sounds. No wheezing or rales.   Abdominal:      General: Bowel sounds are normal. There is no distension.      Palpations: Abdomen is soft.      Tenderness: There is no abdominal tenderness. There is no guarding or rebound.   Musculoskeletal:         General: No deformity. Normal range of motion.      Cervical back: Neck supple.   Lymphadenopathy:      Cervical: No cervical adenopathy.   Skin:     General: Skin is warm and dry.      Capillary Refill: Capillary refill takes less than 2 seconds.      Findings: No erythema or rash.   Neurological:      Mental Status: He is alert and oriented to person, place, and time.      Cranial Nerves: No cranial nerve deficit.      Sensory: No sensory deficit.   Psychiatric:         Mood and Affect: Mood normal.       Significant Labs: All pertinent labs within the past 24 hours have been reviewed.    Significant Imaging: I have reviewed all pertinent imaging results/findings within the past 24 hours.

## 2022-06-19 NOTE — PROGRESS NOTES
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 male with PMHx significant for HTN,, GERD and DVT on Xarelto admitted to hospital medicine as a transfer from [facility] ED for abnormal CT imagine. Patient presented to [facility] ED complaining of shortness of breath that has progressively worsened over the last 8 months. Labs were largely unremarkable.  Chest x-ray revealed no acute abnormalities did not appreciate any infiltrates. Ultrasound of bilateral lower extremities revealed a nonocclusive left distal superficial partial femoral DVT. CTA chest revealed bilateral small pleural effusions, no PE. CT scan abdomen pelvis with IV contrast revealed "apparent soft tissue density involving the cephalad portion of the inferior vena cava and extending into the inferior portion of the right atrium. This measures 6 x 4 by 11.2 cm in AP medial-lateral and craniocaudal dimensions respectively. There is a fluid collection in the inferior right lobe of the liver measuring 4.5 by 4.2 cm." Radiologist at outside facility felt that there may be a mass in the right atrium possibly a myxoma that is the source of the clot. 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 CTS recommended transfer to [facility] for higher level of care.     On arrival, patient reports minimal improvement of shortness of breath with supplemental oxygen. Endorses chest pain with deep breaths and unintentional weight loss over the last few months. Denies fever/chills, HA, cough, abdominal pain, " n/v, diarrhea, constipation, urinary symptoms, weakness. AFVSS.       Overview/Hospital Course:  No notes on file    Interval History: Patient lying in bed, no acute distress. No acute events overnight. Patient reports chest pain and SOB improving. No new complaints. Reports good UOP, regular bowel movements and good po intake. Malignancy and cirrhosis workup pending. Elevated AFP and CEA. Stopped AC per heme/onc. Case will be discussed in IR conference and tumor board in the next week.       Review of Systems   Constitutional:  Negative for activity change, chills, fatigue and fever.   HENT:  Negative for congestion and trouble swallowing.    Eyes:  Negative for visual disturbance.   Respiratory:  Positive for shortness of breath. Negative for cough.    Cardiovascular:  Positive for chest pain. Negative for leg swelling.   Gastrointestinal:  Negative for abdominal distention, abdominal pain, nausea and vomiting.   Endocrine: Negative for cold intolerance, heat intolerance, polydipsia and polyuria.   Genitourinary:  Negative for difficulty urinating and dysuria.   Musculoskeletal:  Negative for back pain and myalgias.   Skin:  Negative for rash and wound.   Neurological:  Negative for dizziness, weakness and light-headedness.   Hematological:  Negative for adenopathy. Does not bruise/bleed easily.   Psychiatric/Behavioral:  Negative for confusion and sleep disturbance.      Objective:     Vital Signs (Most Recent):  Temp: 98.8 °F (37.1 °C) (06/19/22 0745)  Pulse: 77 (06/19/22 0745)  Resp: 18 (06/19/22 0852)  BP: 117/69 (06/19/22 0745)  SpO2: 95 % (06/19/22 0745) Vital Signs (24h Range):  Temp:  [97.8 °F (36.6 °C)-99 °F (37.2 °C)] 98.8 °F (37.1 °C)  Pulse:  [72-90] 77  Resp:  [17-39] 18  SpO2:  [92 %-96 %] 95 %  BP: (111-143)/(68-82) 117/69     Weight: 73.9 kg (163 lb)  Body mass index is 24.78 kg/m².    Intake/Output Summary (Last 24 hours) at 6/19/2022 1000  Last data filed at 6/19/2022 0600  Gross per 24 hour    Intake 250 ml   Output --   Net 250 ml        Physical Exam  Constitutional:       Appearance: He is well-developed.   HENT:      Head: Normocephalic and atraumatic.   Eyes:      General: No scleral icterus.     Pupils: Pupils are equal, round, and reactive to light.   Neck:      Vascular: No JVD.   Cardiovascular:      Rate and Rhythm: Normal rate and regular rhythm.      Heart sounds: No murmur heard.    No friction rub. No gallop.   Pulmonary:      Effort: Pulmonary effort is normal. No respiratory distress.      Breath sounds: Normal breath sounds. No wheezing or rales.   Abdominal:      General: Bowel sounds are normal. There is no distension.      Palpations: Abdomen is soft.      Tenderness: There is no abdominal tenderness. There is no guarding or rebound.   Musculoskeletal:         General: No deformity. Normal range of motion.      Cervical back: Neck supple.   Lymphadenopathy:      Cervical: No cervical adenopathy.   Skin:     General: Skin is warm and dry.      Capillary Refill: Capillary refill takes less than 2 seconds.      Findings: No erythema or rash.   Neurological:      Mental Status: He is alert and oriented to person, place, and time.      Cranial Nerves: No cranial nerve deficit.      Sensory: No sensory deficit.   Psychiatric:         Mood and Affect: Mood normal.       Significant Labs: All pertinent labs within the past 24 hours have been reviewed.    Significant Imaging: I have reviewed all pertinent imaging results/findings within the past 24 hours.      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 This measures 6 x 4 by 11.2 cm in AP medial-lateral and craniocaudal dimensions respectively. There is a fluid  collection in the inferior right lobe of the liver measuring 4.5 by 4.2 cm.  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 We can biopsy the liver lesion in segment 5.  We are unable to biopsy the lesion extending to the right atrium.   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  -- elevated AFP and CEA  -- Would not recommend anticoagulation for tumor thrombus. May need IR for prophylactic stent placement of IVC given near occlusion of RA.   -- case will be discussed in tumor board    Cirrhosis  Hepatology consulted. apprec recs  -- newly diagnosed cirrhosis, likely due to alcoholic liver disease, however would recommend obtaining serologies to rule out other causes of liver disease.    -- please obtain acute hepatitis panel, JOHNY, anti smooth muscle antibody, antimitochondrial antibody, total IgG, alpha-1 antitrypsin phenotype, ceruloplasmin, PETH if not done so already          Liver mass  - hepatology consulted. 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 -- Recommended obtaining tumor markers and if AFP is elevated this is likely HCC.  If unable to diagnosed by imaging and serology, might require liver biopsy.    -- please obtain AFP, CEA, CA 19 9  -- We will discuss his imaging at our multidisciplinary IR conference next week.      GERD (gastroesophageal reflux disease)  - continue home protonix     HTN (hypertension)  - controlled on admit  - continue amlodipine 5mg daily    Chronic deep vein thrombosis (DVT) of femoral vein  Leg pain   - on Xarelto 15mg daily at home, reports last  dose was 6/16  - holding AC per heme/onc  - continue home gabapentin 300mg BID      VTE Risk Mitigation (From admission, onward)         Ordered     Reason for No Pharmacological VTE Prophylaxis  Once        Question:  Reasons:  Answer:  Already adequately anticoagulated on oral Anticoagulants    06/17/22 0828     IP VTE HIGH RISK PATIENT  Once         06/17/22 0828     Place sequential compression device  Until discontinued         06/17/22 0828                Discharge Planning   YANE: 6/22/2022     Code Status: Full Code   Is the patient medically ready for discharge?: No    Reason for patient still in hospital (select all that apply): Patient unstable, Patient trending condition, Laboratory test, Treatment and Consult recommendations  Discharge Plan A: Home with family            Time spent in care of patient: > 35 minutes         Jeremy Cheema MD  Department of Hospital Medicine   [facility] - Intensive Care ([place]

## 2022-06-19 NOTE — ASSESSMENT & PLAN NOTE
Hepatology consulted. apprec recs  -- newly diagnosed cirrhosis, likely due to alcoholic liver disease, however would recommend obtaining serologies to rule out other causes of liver disease.    -- please obtain acute hepatitis panel, JOHNY, anti smooth muscle antibody, antimitochondrial antibody, total IgG, alpha-1 antitrypsin phenotype, ceruloplasmin, PETH if not done so already

## 2022-06-19 NOTE — ASSESSMENT & PLAN NOTE
- hepatology consulted. 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 -- Recommended obtaining tumor markers and if AFP is elevated this is likely HCC.  If unable to diagnosed by imaging and serology, might require liver biopsy.    -- please obtain AFP, CEA, CA 19 9  -- We will discuss his imaging at our multidisciplinary IR conference next week.

## 2022-06-19 NOTE — PROGRESS NOTES
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 male with history of hypertension, DVT on Xarelto presents to the hospital as a transfer from [facility] for abnormal CT imaging.  Initially presented there for shortness of breath.  No obvious cause is found on chest x-ray, he was found to have a superficial partial femoral DVT on ultrasound. CT scan abdomen pelvis with IV contrast revealed "apparent soft tissue density involving the cephalad portion of the inferior vena cava and extending into the inferior portion of the right atrium. This measures 6 x 4 by 11.2 cm in AP medial-lateral and craniocaudal dimensions respectively. There is a fluid collection in the inferior right lobe of the liver measuring 4.5 by 4.2 cm." He was transferred here for further evaluation.     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     The patient denies any prior knowledge of liver disease.  Reports that he was a heavy alcohol drinker, drinking beer daily for many years, quit about a year ago.  Reports ongoing shortness of breath and chest pain.     Hemodynamically stable, labs notable for Hgb 8.8, platelets 97, INR 1.4, sodium 134, creatinine 0.8, to bili 2.7, AST 74 ALT 31.       Interval history:  AFP elevated at > 8000.  Highly suggestive of hepatocellular carcinoma.  No acute events overnight, discussed the diagnosis with the patient's sister who is a nurse practitioner.    No past medical history on file.    Past " Surgical History:   Procedure Laterality Date    MANDIBLE FRACTURE SURGERY         No family history on file.    Social History     Socioeconomic History    Marital status: Single   Tobacco Use    Smoking status: Current Every Day Smoker     Packs/day: 1.00     Types: Cigarettes    Smokeless tobacco: Current User     Types: Chew   Substance and Sexual Activity    Alcohol use: No    Drug use: Yes       No current facility-administered medications on file prior to encounter.     Current Outpatient Medications on File Prior to Encounter   Medication Sig Dispense Refill    albuterol (PROVENTIL/VENTOLIN HFA) 90 mcg/actuation inhaler Inhale 2 puffs into the lungs every 4 (four) hours as needed for Shortness of Breath.      amLODIPine (NORVASC) 5 MG tablet Take 5 mg by mouth every morning.      gabapentin (NEURONTIN) 300 MG capsule Take 300 mg by mouth 2 (two) times daily.      levoFLOXacin (LEVAQUIN) 750 MG tablet Take 750 mg by mouth once daily.      pantoprazole (PROTONIX) 40 MG tablet Take 40 mg by mouth every morning.      XARELTO 15 mg Tab Take 15 mg by mouth every morning.         Review of patient's allergies indicates:  No Known Allergies      Objective:     Vitals:    06/19/22 0852   BP:    Pulse:    Resp: 18   Temp:          Constitutional:  not in acute distress and well developed  HENT: Head: Normal, normocephalic, atraumatic.  Eyes: conjunctiva clear and sclera nonicteric  Cardiovascular: regular rate and rhythm  Respiratory: normal chest expansion & respiratory effort   and no accessory muscle use  GI: soft, non-tender, without masses or organomegaly  Musculoskeletal: no muscular tenderness noted  Skin: normal color  Neurological: alert, oriented x3  Psychiatric: mood and affect are within normal limits, pt is a good historian; no memory problems were noted     Significant Labs:  Recent Labs   Lab 06/16/22  1930 06/18/22  1041 06/19/22  0554   HGB 9.6* 8.8* 8.4*       Lab Results   Component  Value Date    WBC 3.90 06/19/2022    HGB 8.4 (L) 06/19/2022    HCT 24.9 (L) 06/19/2022    MCV 92 06/19/2022    PLT 94 (L) 06/19/2022       Lab Results   Component Value Date     (L) 06/19/2022    K 3.9 06/19/2022     06/19/2022    CO2 18 (L) 06/19/2022    BUN 15 06/19/2022    CREATININE 0.9 06/19/2022    CALCIUM 8.3 (L) 06/19/2022    ANIONGAP 9 06/19/2022    ESTGFRAFRICA >60.0 06/19/2022    EGFRNONAA >60.0 06/19/2022       Lab Results   Component Value Date    ALT 32 06/19/2022    AST 74 (H) 06/19/2022    ALKPHOS 127 06/19/2022    BILITOT 2.5 (H) 06/19/2022       Lab Results   Component Value Date    INR 1.4 (H) 06/17/2022           Significant Imaging:  Reviewed pertinent radiology findings.       Assessment/Plan:       MELD-Na score: 17 at 6/19/2022  5:54 AM  MELD score: 14 at 6/19/2022  5:54 AM  Calculated from:  Serum Creatinine: 0.9 mg/dL (Using min of 1 mg/dL) at 6/19/2022  5:54 AM  Serum Sodium: 134 mmol/L at 6/19/2022  5:54 AM  Total Bilirubin: 2.5 mg/dL at 6/19/2022  5:54 AM  INR(ratio): 1.4 at 6/17/2022  9:14 AM  Age: 61 years    Problem List:  1. Cirrhosis  2. Liver mass  3. IVC thrombus        Assessment:  The patient has newly diagnosed cirrhosis, likely due to alcoholic liver disease, however would recommend obtaining serologies to rule out other causes of liver disease.  He was found to have a liver mass as well as extensive IVC thrombus extending into the right atrium, possible mass as well.  With an elevated AFP this is most likely hepatocellular carcinoma.  Will defer to Oncology to see if there are any chemotherapeutic options available.   We will discuss his imaging at our multidisciplinary IR conference next week.     Recommendations:  - we will discuss his imaging at IR conference next week  - oncology input if there are any chemotherapeutic options    Please obtain daily CBC, BMP, LFT, INR  Thank you for involving us in the care of Irving Linn. Please call with any additional  questions, concerns or changes in the patient's clinical status.    Niecy Penny MD  Gastroenterology & Hepatology Fellow PGY V   [facility]

## 2022-06-19 NOTE — TELEPHONE ENCOUNTER
"Patient: Irving Linn       MRN: [id number]      : 1960     Age: 61 y.o.  [address] [address]    Providers  Hepatologists: Letty Argueta MD  Surgeons: none  Radiologists: none  Advanced Practice providers:     Priority of review: Cancer-inpt; AFP 8000    Patient Transplant Status: Not a candidate    Reason for presentation: Other initial review for evaluation and management    Clinical Summary: Irving Linn is a 61 y.o. male with history of HCV, hypertension, DVT on Xarelto presents to the hospital as a transfer from [facility] for abnormal CT imaging.  Initially presented there for shortness of breath.  No obvious cause is found on chest x-ray, he was found to have a superficial partial femoral DVT on ultrasound. CT scan abdomen pelvis with IV contrast revealed "apparent soft tissue density involving the cephalad portion of the inferior vena cava and extending into the inferior portion of the right atrium. This measures 6 x 4 by 11.2 cm in AP medial-lateral and craniocaudal dimensions respectively. There is a fluid collection in the inferior right lobe of the liver measuring 4.5 by 4.2 cm." He was transferred here for further evaluation.     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    Imaging to be reviewed: MRI abdo/pelvis; ct abdo/pelvis; CTA chest    HCC Treatment History: n/a    ABO:     Platelets:   Lab Results   Component Value Date/Time    PLT 94 (L) 2022 05:54 AM     Creatinine:   Lab Results   Component Value Date/Time    CREATININE 0.9 2022 05:54 AM     Bilirubin:   Lab Results   Component Value Date/Time    BILITOT 2.5 (H) 2022 05:54 AM     AFP Last 3 each if available:   Lab Results   Component Value Date/Time    AFP 8114 (H) 2022 10:41 AM       MELD: MELD-Na score: 17 at 2022  5:54 AM  MELD score: 14 at 2022  " 5:54 AM  Calculated from:  Serum Creatinine: 0.9 mg/dL (Using min of 1 mg/dL) at 6/19/2022  5:54 AM  Serum Sodium: 134 mmol/L at 6/19/2022  5:54 AM  Total Bilirubin: 2.5 mg/dL at 6/19/2022  5:54 AM  INR(ratio): 1.4 at 6/17/2022  9:14 AM  Age: 61 years    Plan:     Follow-up Provider:

## 2022-06-19 NOTE — SUBJECTIVE & OBJECTIVE
Interval History: Patient lying in bed, no acute distress. No acute events overnight. Patient reports chest pain and SOB. Patient also notes good UOP, regular bowel movements and good po intake. Denies fever, chills, cough, N/V, abdominal pain, changes in bowel or bladder function, signs of bleeding, rashes, wounds or swelling. Heme/onc and hepatology consulted for mass in IVC extending to RA and the liver mass. Malignancy workup pending.       Review of Systems   Constitutional:  Negative for activity change, chills, fatigue and fever.   HENT:  Negative for congestion and trouble swallowing.    Eyes:  Negative for visual disturbance.   Respiratory:  Positive for shortness of breath. Negative for cough.    Cardiovascular:  Positive for chest pain. Negative for leg swelling.   Gastrointestinal:  Negative for abdominal distention, abdominal pain, nausea and vomiting.   Endocrine: Negative for cold intolerance, heat intolerance, polydipsia and polyuria.   Genitourinary:  Negative for difficulty urinating and dysuria.   Musculoskeletal:  Negative for back pain and myalgias.   Skin:  Negative for rash and wound.   Neurological:  Negative for dizziness, weakness and light-headedness.   Hematological:  Negative for adenopathy. Does not bruise/bleed easily.   Psychiatric/Behavioral:  Negative for confusion and sleep disturbance.      Objective:     Vital Signs (Most Recent):  Temp: 98.8 °F (37.1 °C) (06/19/22 0745)  Pulse: 77 (06/19/22 0745)  Resp: 18 (06/19/22 0852)  BP: 117/69 (06/19/22 0745)  SpO2: 95 % (06/19/22 0745) Vital Signs (24h Range):  Temp:  [97.8 °F (36.6 °C)-99 °F (37.2 °C)] 98.8 °F (37.1 °C)  Pulse:  [72-90] 77  Resp:  [17-39] 18  SpO2:  [92 %-96 %] 95 %  BP: (111-143)/(68-82) 117/69     Weight: 73.9 kg (163 lb)  Body mass index is 24.78 kg/m².    Intake/Output Summary (Last 24 hours) at 6/19/2022 0951  Last data filed at 6/19/2022 0600  Gross per 24 hour   Intake 250 ml   Output --   Net 250 ml       Physical Exam  Constitutional:       Appearance: He is well-developed.   HENT:      Head: Normocephalic and atraumatic.   Eyes:      General: No scleral icterus.     Pupils: Pupils are equal, round, and reactive to light.   Neck:      Vascular: No JVD.   Cardiovascular:      Rate and Rhythm: Normal rate and regular rhythm.      Heart sounds: No murmur heard.    No friction rub. No gallop.   Pulmonary:      Effort: Pulmonary effort is normal. No respiratory distress.      Breath sounds: Normal breath sounds. No wheezing or rales.   Abdominal:      General: Bowel sounds are normal. There is no distension.      Palpations: Abdomen is soft.      Tenderness: There is no abdominal tenderness. There is no guarding or rebound.   Musculoskeletal:         General: No deformity. Normal range of motion.      Cervical back: Neck supple.   Lymphadenopathy:      Cervical: No cervical adenopathy.   Skin:     General: Skin is warm and dry.      Capillary Refill: Capillary refill takes less than 2 seconds.      Findings: No erythema or rash.   Neurological:      Mental Status: He is alert and oriented to person, place, and time.      Cranial Nerves: No cranial nerve deficit.      Sensory: No sensory deficit.   Psychiatric:         Mood and Affect: Mood normal.       Significant Labs: All pertinent labs within the past 24 hours have been reviewed.    Significant Imaging: I have reviewed all pertinent imaging results/findings within the past 24 hours.

## 2022-06-19 NOTE — ASSESSMENT & PLAN NOTE
Leg pain   - on Xarelto 15mg daily at home, reports last dose was 6/16  - holding AC per heme/onc  - continue home gabapentin 300mg BID

## 2022-06-19 NOTE — PROGRESS NOTES
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 male with PMHx significant for HTN,, GERD and DVT on Xarelto admitted to hospital medicine as a transfer from [facility] ED for abnormal CT imagine. Patient presented to [facility] ED complaining of shortness of breath that has progressively worsened over the last 8 months. Labs were largely unremarkable.  Chest x-ray revealed no acute abnormalities did not appreciate any infiltrates. Ultrasound of bilateral lower extremities revealed a nonocclusive left distal superficial partial femoral DVT. CTA chest revealed bilateral small pleural effusions, no PE. CT scan abdomen pelvis with IV contrast revealed "apparent soft tissue density involving the cephalad portion of the inferior vena cava and extending into the inferior portion of the right atrium. This measures 6 x 4 by 11.2 cm in AP medial-lateral and craniocaudal dimensions respectively. There is a fluid collection in the inferior right lobe of the liver measuring 4.5 by 4.2 cm." Radiologist at outside facility felt that there may be a mass in the right atrium possibly a myxoma that is the source of the clot. 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 CTS recommended transfer to [facility] for higher level of care.     On arrival, patient reports minimal improvement of shortness of breath with supplemental oxygen. Endorses chest pain with deep breaths and unintentional weight loss over the last few months. Denies fever/chills, HA, cough, abdominal pain, " n/v, diarrhea, constipation, urinary symptoms, weakness. AFVSS.       Overview/Hospital Course:  No notes on file    Interval History: Patient lying in bed, no acute distress. No acute events overnight. Patient reports chest pain and SOB. Patient also notes good UOP, regular bowel movements and good po intake. Denies fever, chills, cough, N/V, abdominal pain, changes in bowel or bladder function, signs of bleeding, rashes, wounds or swelling. Heme/onc and hepatology consulted for mass in IVC extending to RA and the liver mass. Malignancy workup pending.       Review of Systems   Constitutional:  Negative for activity change, chills, fatigue and fever.   HENT:  Negative for congestion and trouble swallowing.    Eyes:  Negative for visual disturbance.   Respiratory:  Positive for shortness of breath. Negative for cough.    Cardiovascular:  Positive for chest pain. Negative for leg swelling.   Gastrointestinal:  Negative for abdominal distention, abdominal pain, nausea and vomiting.   Endocrine: Negative for cold intolerance, heat intolerance, polydipsia and polyuria.   Genitourinary:  Negative for difficulty urinating and dysuria.   Musculoskeletal:  Negative for back pain and myalgias.   Skin:  Negative for rash and wound.   Neurological:  Negative for dizziness, weakness and light-headedness.   Hematological:  Negative for adenopathy. Does not bruise/bleed easily.   Psychiatric/Behavioral:  Negative for confusion and sleep disturbance.      Objective:     Vital Signs (Most Recent):  Temp: 98.8 °F (37.1 °C) (06/19/22 0745)  Pulse: 77 (06/19/22 0745)  Resp: 18 (06/19/22 0852)  BP: 117/69 (06/19/22 0745)  SpO2: 95 % (06/19/22 0745) Vital Signs (24h Range):  Temp:  [97.8 °F (36.6 °C)-99 °F (37.2 °C)] 98.8 °F (37.1 °C)  Pulse:  [72-90] 77  Resp:  [17-39] 18  SpO2:  [92 %-96 %] 95 %  BP: (111-143)/(68-82) 117/69     Weight: 73.9 kg (163 lb)  Body mass index is 24.78 kg/m².    Intake/Output Summary (Last 24 hours) at  6/19/2022 0951  Last data filed at 6/19/2022 0600  Gross per 24 hour   Intake 250 ml   Output --   Net 250 ml      Physical Exam  Constitutional:       Appearance: He is well-developed.   HENT:      Head: Normocephalic and atraumatic.   Eyes:      General: No scleral icterus.     Pupils: Pupils are equal, round, and reactive to light.   Neck:      Vascular: No JVD.   Cardiovascular:      Rate and Rhythm: Normal rate and regular rhythm.      Heart sounds: No murmur heard.    No friction rub. No gallop.   Pulmonary:      Effort: Pulmonary effort is normal. No respiratory distress.      Breath sounds: Normal breath sounds. No wheezing or rales.   Abdominal:      General: Bowel sounds are normal. There is no distension.      Palpations: Abdomen is soft.      Tenderness: There is no abdominal tenderness. There is no guarding or rebound.   Musculoskeletal:         General: No deformity. Normal range of motion.      Cervical back: Neck supple.   Lymphadenopathy:      Cervical: No cervical adenopathy.   Skin:     General: Skin is warm and dry.      Capillary Refill: Capillary refill takes less than 2 seconds.      Findings: No erythema or rash.   Neurological:      Mental Status: He is alert and oriented to person, place, and time.      Cranial Nerves: No cranial nerve deficit.      Sensory: No sensory deficit.   Psychiatric:         Mood and Affect: Mood normal.       Significant Labs: All pertinent labs within the past 24 hours have been reviewed.    Significant Imaging: I have reviewed all pertinent imaging results/findings within the past 24 hours.      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 This measures 6 x 4 by 11.2 cm in AP  medial-lateral and craniocaudal dimensions respectively. There is a fluid collection in the inferior right lobe of the liver measuring 4.5 by 4.2 cm.  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 We can biopsy the liver lesion in segment 5.  We are unable to biopsy the lesion extending to the right atrium.   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 If AFP not elevated w/ atypical imaging will likely need biopsy.  -- Would not recommend anticoagulation for tumor thrombus. May need IR for prophylactic stent placement of IVC given near occlusion of RA.   -- case will be discussed in tumor board    Cirrhosis  Hepatology consulted. apprec recs  -- newly diagnosed cirrhosis, likely due to alcoholic liver disease, however would recommend obtaining serologies to rule out other causes of liver disease.    -- please obtain acute hepatitis panel, JOHNY, anti smooth muscle antibody, antimitochondrial antibody, total IgG, alpha-1 antitrypsin phenotype, ceruloplasmin, PETH if not done so already          Liver mass  - hepatology consulted. 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 -- Recommended obtaining tumor markers and if AFP is elevated this is likely HCC.  If unable to diagnosed by imaging and serology, might require liver biopsy.    -- please obtain AFP, CEA, CA 19 9  -- We will discuss his imaging at our multidisciplinary IR conference next week.      GERD (gastroesophageal reflux disease)  - continue home protonix     HTN (hypertension)  - controlled on admit  - continue amlodipine 5mg  daily    Chronic deep vein thrombosis (DVT) of femoral vein  Leg pain   - on Xarelto 15mg daily at home, reports last dose was 6/16  - holding AC per heme/onc  - continue home gabapentin 300mg BID      VTE Risk Mitigation (From admission, onward)         Ordered     Reason for No Pharmacological VTE Prophylaxis  Once        Question:  Reasons:  Answer:  Already adequately anticoagulated on oral Anticoagulants    06/17/22 0828     IP VTE HIGH RISK PATIENT  Once         06/17/22 0828     Place sequential compression device  Until discontinued         06/17/22 0828                Discharge Planning   YANE: 6/22/2022     Code Status: Full Code   Is the patient medically ready for discharge?: No    Reason for patient still in hospital (select all that apply): Patient unstable, Patient new problem, Patient trending condition, Laboratory test, Treatment, Imaging and Consult recommendations  Discharge Plan A: Home with family          Time spent in care of patient: > 35 minutes           Jeremy Cheema MD  Department of Hospital Medicine   [facility] - Intensive Care ([place]-14)

## 2022-06-20 NOTE — CARE UPDATE
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    Last VS: /65 (BP Location: Right arm, Patient Position: Lying)   Pulse 92   Temp 98.2 °F (36.8 °C) (Oral)   Resp 16   Ht 5' 8" (1.727 m)   Wt 73.9 kg (163 lb)   SpO2 (!) 94%   BMI 24.78 kg/m²     24H Vital Sign Range:  Temp:  [98.1 °F (36.7 °C)-99 °F (37.2 °C)]   Pulse:  [77-92]   Resp:  [16-39]   BP: (116-121)/(65-70)   SpO2:  [92 %-95 %]     Level of Consciousness (AVPU): alert    Recent Labs     06/18/22  1041 06/19/22  0554   WBC 3.45* 3.90   HGB 8.8* 8.4*   HCT 27.0* 24.9*   PLT 97* 94*       Recent Labs     06/18/22  0735 06/19/22  0554   * 134*   K 3.8 3.9    107   CO2 21* 18*   CREATININE 0.8 0.9   GLU 82 91   MG 1.5* 1.6        No results for input(s): PH, PCO2, PO2, HCO3, POCSATURATED, BE in the last 72 hours.     OXYGEN:  Flow (L/min): 2     O2 Device (Oxygen Therapy): room air    MEWS score: 1    Charge Kasia GONZALEZ contacted. No concerns verbalized at this time. Instructed to call [phone] for further concerns or assistance.    Adela Larson RN      "

## 2022-06-20 NOTE — PLAN OF CARE
Problem: Pain Acute  Goal: Acceptable Pain Control and Functional Ability  Outcome: Ongoing, Progressing     Problem: Coping Ineffective  Goal: Effective Coping  Outcome: Ongoing, Progressing

## 2022-06-21 NOTE — PROGRESS NOTES
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 male with PMHx significant for HTN,, GERD and DVT on Xarelto admitted to hospital medicine as a transfer from [facility] ED for abnormal CT imagine. Patient presented to [facility] ED complaining of shortness of breath that has progressively worsened over the last 8 months. Labs were largely unremarkable.  Chest x-ray revealed no acute abnormalities did not appreciate any infiltrates. Ultrasound of bilateral lower extremities revealed a nonocclusive left distal superficial partial femoral DVT. CTA chest revealed bilateral small pleural effusions, no PE. CT scan abdomen pelvis with IV contrast revealed "apparent soft tissue density involving the cephalad portion of the inferior vena cava and extending into the inferior portion of the right atrium. This measures 6 x 4 by 11.2 cm in AP medial-lateral and craniocaudal dimensions respectively. There is a fluid collection in the inferior right lobe of the liver measuring 4.5 by 4.2 cm." Radiologist at outside facility felt that there may be a mass in the right atrium possibly a myxoma that is the source of the clot. 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 CTS recommended transfer to [facility] for higher level of care.     On arrival, patient reports minimal improvement of shortness of breath with supplemental oxygen. Endorses chest pain with deep breaths and unintentional weight loss over the last few months. Denies fever/chills, HA, cough, abdominal pain, " n/v, diarrhea, constipation, urinary symptoms, weakness. AFVSS.       Overview/Hospital Course:  No notes on file    Interval History: Patient lying in bed, no acute distress. No acute events overnight. Patient reports abdominal pain moderately controlled. No other complaints. Awaiting hematology and hepatology recs once IR and tumor board review of the case is completed.     Review of Systems   Constitutional:  Negative for activity change, chills, fatigue and fever.   HENT:  Negative for congestion and trouble swallowing.    Eyes:  Negative for visual disturbance.   Respiratory:  Negative for cough and shortness of breath.    Cardiovascular:  Positive for chest pain. Negative for leg swelling.   Gastrointestinal:  Positive for abdominal pain. Negative for abdominal distention, nausea and vomiting.   Endocrine: Negative for cold intolerance, heat intolerance, polydipsia and polyuria.   Genitourinary:  Negative for difficulty urinating and dysuria.   Musculoskeletal:  Negative for back pain and myalgias.   Skin:  Negative for rash and wound.   Neurological:  Negative for dizziness, weakness and light-headedness.   Hematological:  Negative for adenopathy. Does not bruise/bleed easily.   Psychiatric/Behavioral:  Negative for confusion and sleep disturbance.      Objective:     Vital Signs (Most Recent):  Temp: 99.5 °F (37.5 °C) (06/21/22 0040)  Pulse: 83 (06/21/22 0040)  Resp: (!) 37 (06/21/22 0040)  BP: 128/77 (06/21/22 0040)  SpO2: 95 % (06/21/22 0040) Vital Signs (24h Range):  Temp:  [98.2 °F (36.8 °C)-99.5 °F (37.5 °C)] 99.5 °F (37.5 °C)  Pulse:  [80-87] 83  Resp:  [11-37] 37  SpO2:  [93 %-100 %] 95 %  BP: (120-134)/(76-85) 128/77     Weight: 73.9 kg (163 lb)  Body mass index is 24.78 kg/m².    Intake/Output Summary (Last 24 hours) at 6/21/2022 0131  Last data filed at 6/20/2022 2100  Gross per 24 hour   Intake --   Output 400 ml   Net -400 ml        Physical Exam  Constitutional:       Appearance: He is  well-developed.   HENT:      Head: Normocephalic and atraumatic.   Eyes:      General: No scleral icterus.     Pupils: Pupils are equal, round, and reactive to light.   Neck:      Vascular: No JVD.   Cardiovascular:      Rate and Rhythm: Normal rate and regular rhythm.      Heart sounds: No murmur heard.    No friction rub. No gallop.   Pulmonary:      Effort: Pulmonary effort is normal. No respiratory distress.      Breath sounds: Normal breath sounds. No wheezing or rales.   Abdominal:      General: Bowel sounds are normal. There is no distension.      Palpations: Abdomen is soft.      Tenderness: There is no abdominal tenderness. There is no guarding or rebound.   Musculoskeletal:         General: No deformity. Normal range of motion.      Cervical back: Neck supple.   Lymphadenopathy:      Cervical: No cervical adenopathy.   Skin:     General: Skin is warm and dry.      Capillary Refill: Capillary refill takes less than 2 seconds.      Findings: No erythema or rash.   Neurological:      Mental Status: He is alert and oriented to person, place, and time.      Cranial Nerves: No cranial nerve deficit.      Sensory: No sensory deficit.   Psychiatric:         Mood and Affect: Mood normal.       Significant Labs: All pertinent labs within the past 24 hours have been reviewed.    Significant Imaging: I have reviewed all pertinent imaging results/findings within the past 24 hours.      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 This measures 6 x 4 by 11.2 cm in AP medial-lateral and craniocaudal dimensions respectively. There is a fluid collection in the inferior right lobe of the liver measuring 4.5 by 4.2 cm.  - Radiology concerned for IVC  clot with right atrial myxoma  - Cardiothoracic surgery consulted, recommend IR consult for possible guided biopsy of liver mass prior to any other recommendations  - IR consulted, appreciate recs  -- IR biopsy can be performed in the outpatient setting. We can biopsy the liver lesion in segment 5.  We are unable to biopsy the lesion extending to the right atrium.   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  -- elevated AFP and CEA  -- Would not recommend anticoagulation for tumor thrombus. May need IR for prophylactic stent placement of IVC given near occlusion of RA.   -- case will be discussed in tumor board    Cirrhosis  Hepatology consulted. apprec recs  -- newly diagnosed cirrhosis, likely due to alcoholic liver disease, however would recommend obtaining serologies to rule out other causes of liver disease.    -- please obtain acute hepatitis panel, JOHNY, anti smooth muscle antibody, antimitochondrial antibody, total IgG, alpha-1 antitrypsin phenotype, ceruloplasmin, PETH if not done so already          Liver mass  - hepatology consulted. 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 -- Recommended obtaining tumor markers and if AFP is elevated this is likely HCC.  If unable to diagnosed by imaging and serology, might require liver biopsy.    -- please obtain AFP, CEA, CA 19 9  -- We will discuss his imaging at our multidisciplinary IR conference next week.      GERD (gastroesophageal reflux disease)  - continue home protonix     HTN (hypertension)  - controlled on admit  - continue amlodipine 5mg daily    Chronic deep vein thrombosis (DVT) of femoral vein  Leg pain   - on Xarelto 15mg daily at home, reports last dose was 6/16  - holding AC per heme/onc  - continue home gabapentin 300mg BID      VTE Risk Mitigation (From  admission, onward)         Ordered     Reason for No Pharmacological VTE Prophylaxis  Once        Question:  Reasons:  Answer:  Already adequately anticoagulated on oral Anticoagulants    06/17/22 0828     IP VTE HIGH RISK PATIENT  Once         06/17/22 0828     Place sequential compression device  Until discontinued         06/17/22 0828                Discharge Planning   YANE: 6/22/2022     Code Status: Full Code   Is the patient medically ready for discharge?: No    Reason for patient still in hospital (select all that apply): Patient unstable, Patient trending condition, Laboratory test, Treatment and Consult recommendations  Discharge Plan A: Home with family          Time spent in care of patient: > 35 minutes           Jeremy Cheema MD  Department of Hospital Medicine   [facility] - Intensive Care ([place] [address])

## 2022-06-21 NOTE — TREATMENT PLAN
Hepatology Treatment Plan    Irving Linn is a 61 y.o. 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     Interval History  NAEON.  Imaging reviewed at radiology conference.      Objective  Temp:  [98.2 °F (36.8 °C)-99.5 °F (37.5 °C)] 98.7 °F (37.1 °C) (06/21 0730)  Pulse:  [80-88] 88 (06/21 0730)  BP: (121-134)/(77-85) 134/85 (06/21 0735)  Resp:  [16-37] 18 (06/21 0914)  SpO2:  [93 %-99 %] 94 % (06/21 0730)    Laboratory    Lab Results   Component Value Date    WBC 3.98 06/21/2022    HGB 8.2 (L) 06/21/2022    HCT 23.6 (L) 06/21/2022    MCV 94 06/21/2022     (L) 06/21/2022       Lab Results   Component Value Date     (L) 06/20/2022    K 3.9 06/20/2022     06/20/2022    CO2 21 (L) 06/20/2022    BUN 14 06/20/2022    CREATININE 0.8 06/20/2022    CALCIUM 8.6 (L) 06/20/2022       Lab Results   Component Value Date    ALBUMIN 2.5 (L) 06/20/2022    ALT 35 06/20/2022    AST 90 (H) 06/20/2022    ALKPHOS 131 06/20/2022    BILITOT 2.4 (H) 06/20/2022       Lab Results   Component Value Date    INR 1.4 (H) 06/17/2022       MELD-Na score: 17 at 6/19/2022  5:54 AM  MELD score: 14 at 6/19/2022  5:54 AM  Calculated from:  Serum Creatinine: 0.9 mg/dL (Using min of 1 mg/dL) at 6/19/2022  5:54 AM  Serum Sodium: 134 mmol/L at 6/19/2022  5:54 AM  Total Bilirubin: 2.5 mg/dL at 6/19/2022  5:54 AM  INR(ratio): 1.4 at 6/17/2022  9:14 AM  Age: 61 years    Plan  - Will need outpatient follow up in Oncology clinic to discuss systemic chemotherapy options  - further management per primary  - please obtain daily CBC, BMP, LFT, INR  - Hepatology will sign off at this time    Thank you for involving us in the care of Irving Linn. Please call with any additional concerns or questions.    Colton Lam MD  Gastroenterology Fellow

## 2022-06-21 NOTE — SUBJECTIVE & OBJECTIVE
Interval History: Patient lying in bed, no acute distress. No acute events overnight. Patient reports abdominal pain moderately controlled. No other complaints. Awaiting hematology and hepatology recs once IR and tumor board review of the case is completed.     Review of Systems   Constitutional:  Negative for activity change, chills, fatigue and fever.   HENT:  Negative for congestion and trouble swallowing.    Eyes:  Negative for visual disturbance.   Respiratory:  Negative for cough and shortness of breath.    Cardiovascular:  Positive for chest pain. Negative for leg swelling.   Gastrointestinal:  Positive for abdominal pain. Negative for abdominal distention, nausea and vomiting.   Endocrine: Negative for cold intolerance, heat intolerance, polydipsia and polyuria.   Genitourinary:  Negative for difficulty urinating and dysuria.   Musculoskeletal:  Negative for back pain and myalgias.   Skin:  Negative for rash and wound.   Neurological:  Negative for dizziness, weakness and light-headedness.   Hematological:  Negative for adenopathy. Does not bruise/bleed easily.   Psychiatric/Behavioral:  Negative for confusion and sleep disturbance.      Objective:     Vital Signs (Most Recent):  Temp: 99.5 °F (37.5 °C) (06/21/22 0040)  Pulse: 83 (06/21/22 0040)  Resp: (!) 37 (06/21/22 0040)  BP: 128/77 (06/21/22 0040)  SpO2: 95 % (06/21/22 0040) Vital Signs (24h Range):  Temp:  [98.2 °F (36.8 °C)-99.5 °F (37.5 °C)] 99.5 °F (37.5 °C)  Pulse:  [80-87] 83  Resp:  [11-37] 37  SpO2:  [93 %-100 %] 95 %  BP: (120-134)/(76-85) 128/77     Weight: 73.9 kg (163 lb)  Body mass index is 24.78 kg/m².    Intake/Output Summary (Last 24 hours) at 6/21/2022 0131  Last data filed at 6/20/2022 2100  Gross per 24 hour   Intake --   Output 400 ml   Net -400 ml        Physical Exam  Constitutional:       Appearance: He is well-developed.   HENT:      Head: Normocephalic and atraumatic.   Eyes:      General: No scleral icterus.     Pupils: Pupils  are equal, round, and reactive to light.   Neck:      Vascular: No JVD.   Cardiovascular:      Rate and Rhythm: Normal rate and regular rhythm.      Heart sounds: No murmur heard.    No friction rub. No gallop.   Pulmonary:      Effort: Pulmonary effort is normal. No respiratory distress.      Breath sounds: Normal breath sounds. No wheezing or rales.   Abdominal:      General: Bowel sounds are normal. There is no distension.      Palpations: Abdomen is soft.      Tenderness: There is no abdominal tenderness. There is no guarding or rebound.   Musculoskeletal:         General: No deformity. Normal range of motion.      Cervical back: Neck supple.   Lymphadenopathy:      Cervical: No cervical adenopathy.   Skin:     General: Skin is warm and dry.      Capillary Refill: Capillary refill takes less than 2 seconds.      Findings: No erythema or rash.   Neurological:      Mental Status: He is alert and oriented to person, place, and time.      Cranial Nerves: No cranial nerve deficit.      Sensory: No sensory deficit.   Psychiatric:         Mood and Affect: Mood normal.       Significant Labs: All pertinent labs within the past 24 hours have been reviewed.    Significant Imaging: I have reviewed all pertinent imaging results/findings within the past 24 hours.

## 2022-06-22 NOTE — SUBJECTIVE & OBJECTIVE
No current facility-administered medications on file prior to encounter.     Current Outpatient Medications on File Prior to Encounter   Medication Sig    albuterol (PROVENTIL/VENTOLIN HFA) 90 mcg/actuation inhaler Inhale 2 puffs into the lungs every 4 (four) hours as needed for Shortness of Breath.    amLODIPine (NORVASC) 5 MG tablet Take 5 mg by mouth every morning.    gabapentin (NEURONTIN) 300 MG capsule Take 300 mg by mouth 2 (two) times daily.    pantoprazole (PROTONIX) 40 MG tablet Take 40 mg by mouth every morning.    XARELTO 15 mg Tab Take 15 mg by mouth every morning.       Review of patient's allergies indicates:  No Known Allergies    No past medical history on file.  Past Surgical History:   Procedure Laterality Date    MANDIBLE FRACTURE SURGERY       Family History    None       Tobacco Use    Smoking status: Current Every Day Smoker     Packs/day: 1.00     Types: Cigarettes    Smokeless tobacco: Current User     Types: Chew   Substance and Sexual Activity    Alcohol use: No    Drug use: Yes    Sexual activity: Not on file     Comment: Crack     Review of Systems   Constitutional:  Positive for activity change.   HENT:  Negative for nosebleeds.    Eyes:  Negative for visual disturbance.   Respiratory:  Positive for shortness of breath.    Cardiovascular:  Negative for chest pain.   Gastrointestinal:  Negative for nausea.   Musculoskeletal:  Negative for gait problem.   Skin:  Negative for color change.   Neurological:  Negative for dizziness.   Hematological:  Does not bruise/bleed easily.   Psychiatric/Behavioral:  Negative for sleep disturbance.    Objective:     Vital Signs (Most Recent):  Temp: 98.9 °F (37.2 °C) (06/22/22 1110)  Pulse: 99 (06/22/22 1110)  Resp: 20 (06/22/22 1110)  BP: 129/81 (06/22/22 0715)  SpO2: (!) 94 % (06/22/22 1110)   Vital Signs (24h Range):  Temp:  [98.6 °F (37 °C)-99.2 °F (37.3 °C)] 98.9 °F (37.2 °C)  Pulse:  [70-99] 99  Resp:  [13-20] 20  SpO2:  [92 %-95 %] 94 %  BP:  (120-136)/(75-85) 129/81     Weight: 73.9 kg (163 lb)  Body mass index is 24.78 kg/m².    SpO2: (!) 94 %  O2 Device (Oxygen Therapy): room air     Intake/Output - Last 3 Shifts         06/20 0700  06/21 0659 06/21 0700 06/22 0659 06/22 0700  06/23 0659    P.O. 250      Total Intake(mL/kg) 250 (3.4)      Urine (mL/kg/hr) 650 (0.4) 575 (0.3) 225 (0.4)    Total Output 650 575 225    Net -400 -575 -225                    Lines/Drains/Airways       Peripheral Intravenous Line  Duration                  Peripheral IV - Single Lumen 06/16/22 1935 20 G Right Antecubital 5 days                     STS Risk Score: n/a    Physical Exam  Constitutional:       Appearance: Normal appearance.   HENT:      Head: Normocephalic and atraumatic.   Eyes:      Pupils: Pupils are equal, round, and reactive to light.   Cardiovascular:      Rate and Rhythm: Normal rate and regular rhythm.   Pulmonary:      Effort: No respiratory distress.   Musculoskeletal:         General: Normal range of motion.      Cervical back: Normal range of motion.   Skin:     Coloration: Skin is not pale.   Neurological:      General: No focal deficit present.      Mental Status: He is alert.   Psychiatric:         Mood and Affect: Mood normal.         Behavior: Behavior normal.       Significant Labs:  ABGs: No results for input(s): PH, PCO2, PO2, HCO3, POCSATURATED, BE in the last 48 hours.  Amylase: No results for input(s): AMYLASE in the last 48 hours.  BMP:   Recent Labs   Lab 06/22/22  1005   MG 1.6     Cardiac markers: No results for input(s): CKMB, CPKMB, TROPONINT, TROPONINI, MYOGLOBIN in the last 48 hours.  CBC:   Recent Labs   Lab 06/22/22  0330   WBC 4.35   RBC 2.83*   HGB 8.6*   HCT 25.8*   PLT 85*   MCV 91   MCH 30.4   MCHC 33.3     CMP: No results for input(s): GLU, CALCIUM, ALBUMIN, PROT, NA, K, CO2, CL, BUN, CREATININE, ALKPHOS, ALT, AST, BILITOT in the last 48 hours.  Coagulation: No results for input(s): PT, INR, APTT in the last 48  hours.  Lactic Acid: No results for input(s): LACTATE in the last 48 hours.  LFTs: No results for input(s): ALT, AST, ALKPHOS, BILITOT, PROT, ALBUMIN in the last 48 hours.  Lipase: No results for input(s): LIPASE in the last 48 hours.    Significant Diagnostics:  I have reviewed all pertinent imaging results/findings within the past 24 hours.

## 2022-06-22 NOTE — PLAN OF CARE
Pt AAOX4. VSS. Pt ambulates throughout the room independently. PRN medication given for pain. Full relief obtained. Will continue to monitor.

## 2022-06-22 NOTE — CONSULTS
Heme/Onc Plan of Care:    Patient will be seen in Outpatient Oncology Clinic to determine if patient is a candidate for systemic therapy.  Will refer outpatient to IR for possibility of radioembolization, although liver function may impede this. Can discuss case with CTS/Cardiology to see if tumor could be debulked since it extends into the right atrium.    We will sign off.   Please call for qs.

## 2022-06-22 NOTE — ASSESSMENT & PLAN NOTE
- hepatology consulted. 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 -- Recommended obtaining tumor markers and if AFP is elevated this is likely HCC.  If unable to diagnosed by imaging and serology, might require liver biopsy.    -- please obtain AFP, CEA, CA 19 9  - Awaiting hematology and hepatology recs once IR and tumor board review of the case is completed to determine if patient will require additional workup or treatment in inpatient vs outpatient setting.

## 2022-06-22 NOTE — PLAN OF CARE
PATIENT NOTED SLEEPING MOST OF THE DAY AND NIGHT.  NAD NOTED.  SET GOAL WITH PATIENT TO GET UP AND SET IN THE RECLINER.    Problem: Adult Inpatient Plan of Care  Goal: Plan of Care Review  Outcome: Ongoing, Progressing  Goal: Patient-Specific Goal (Individualized)  Outcome: Ongoing, Progressing  Goal: Readiness for Transition of Care  Outcome: Ongoing, Progressing     Problem: Coping Ineffective  Goal: Effective Coping  Outcome: Ongoing, Progressing

## 2022-06-22 NOTE — PROGRESS NOTES
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 male with PMHx significant for HTN,, GERD and DVT on Xarelto admitted to hospital medicine as a transfer from [facility] ED for abnormal CT imagine. Patient presented to [facility] ED complaining of shortness of breath that has progressively worsened over the last 8 months. Labs were largely unremarkable.  Chest x-ray revealed no acute abnormalities did not appreciate any infiltrates. Ultrasound of bilateral lower extremities revealed a nonocclusive left distal superficial partial femoral DVT. CTA chest revealed bilateral small pleural effusions, no PE. CT scan abdomen pelvis with IV contrast revealed "apparent soft tissue density involving the cephalad portion of the inferior vena cava and extending into the inferior portion of the right atrium. This measures 6 x 4 by 11.2 cm in AP medial-lateral and craniocaudal dimensions respectively. There is a fluid collection in the inferior right lobe of the liver measuring 4.5 by 4.2 cm." Radiologist at outside facility felt that there may be a mass in the right atrium possibly a myxoma that is the source of the clot. 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 CTS recommended [place] [facility] [place] higher level of care.     On arrival, patient reports minimal improvement of shortness of breath with supplemental oxygen. Endorses chest pain with deep breaths and unintentional weight loss over the last few months. Denies fever/chills, HA, cough, abdominal " pain, n/v, diarrhea, constipation, urinary symptoms, weakness. AFVSS.       Overview/Hospital Course:  No notes on file    Interval History: Patient lying in bed, no acute distress. No acute events overnight. Patient reports abdominal pain moderately controlled. No other complaints. Awaiting hematology and hepatology recs once IR and tumor board review of the case is completed to determine if patient will require additional workup or treatment in inpatient vs outpatient setting.     Review of Systems   Constitutional:  Negative for activity change, chills, fatigue and fever.   HENT:  Negative for congestion and trouble swallowing.    Eyes:  Negative for visual disturbance.   Respiratory:  Negative for cough and shortness of breath.    Cardiovascular:  Positive for chest pain. Negative for leg swelling.   Gastrointestinal:  Positive for abdominal pain. Negative for abdominal distention, nausea and vomiting.   Endocrine: Negative for cold intolerance, heat intolerance, polydipsia and polyuria.   Genitourinary:  Negative for difficulty urinating and dysuria.   Musculoskeletal:  Negative for back pain and myalgias.   Skin:  Negative for rash and wound.   Neurological:  Negative for dizziness, weakness and light-headedness.   Hematological:  Negative for adenopathy. Does not bruise/bleed easily.   Psychiatric/Behavioral:  Negative for confusion and sleep disturbance.      Objective:     Vital Signs (Most Recent):  Temp: 98.9 °F (37.2 °C) (06/21/22 2140)  Pulse: 82 (06/21/22 2140)  Resp: 13 (06/21/22 2140)  BP: 123/76 (06/21/22 2140)  SpO2: (!) 92 % (06/21/22 2140) Vital Signs (24h Range):  Temp:  [98.7 °F (37.1 °C)-99.5 °F (37.5 °C)] 98.9 °F (37.2 °C)  Pulse:  [70-88] 82  Resp:  [13-37] 13  SpO2:  [92 %-96 %] 92 %  BP: (116-134)/(73-85) 123/76     Weight: 73.9 kg (163 lb)  Body mass index is 24.78 kg/m².    Intake/Output Summary (Last 24 hours) at 6/21/2022 2233  Last data filed at 6/21/2022 1600  Gross per 24 hour    Intake --   Output 825 ml   Net -825 ml        Physical Exam  Constitutional:       Appearance: He is well-developed.   HENT:      Head: Normocephalic and atraumatic.   Eyes:      General: No scleral icterus.     Pupils: Pupils are equal, round, and reactive to light.   Neck:      Vascular: No JVD.   Cardiovascular:      Rate and Rhythm: Normal rate and regular rhythm.      Heart sounds: No murmur heard.    No friction rub. No gallop.   Pulmonary:      Effort: Pulmonary effort is normal. No respiratory distress.      Breath sounds: Normal breath sounds. No wheezing or rales.   Abdominal:      General: Bowel sounds are normal. There is no distension.      Palpations: Abdomen is soft.      Tenderness: There is no abdominal tenderness. There is no guarding or rebound.   Musculoskeletal:         General: No deformity. Normal range of motion.      Cervical back: Neck supple.   Lymphadenopathy:      Cervical: No cervical adenopathy.   Skin:     General: Skin is warm and dry.      Capillary Refill: Capillary refill takes less than 2 seconds.      Findings: No erythema or rash.   Neurological:      Mental Status: He is alert and oriented to person, place, and time.      Cranial Nerves: No cranial nerve deficit.      Sensory: No sensory deficit.   Psychiatric:         Mood and Affect: Mood normal.       Significant Labs: All pertinent labs within the past 24 hours have been reviewed.    Significant Imaging: I have reviewed all pertinent imaging results/findings within the past 24 hours.      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 This measures 6 x 4 by 11.2 cm in AP medial-lateral and craniocaudal dimensions respectively. There is a fluid  collection in the inferior right lobe of the liver measuring 4.5 by 4.2 cm.  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 We can biopsy the liver lesion in segment 5.  We are unable to biopsy the lesion extending to the right atrium.   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  -- elevated AFP and CEA  -- Would not recommend anticoagulation for tumor thrombus. May need IR for prophylactic stent placement of IVC given near occlusion of RA.   -- case will be discussed in tumor board    Cirrhosis  Hepatology consulted. apprec recs  -- newly diagnosed cirrhosis, likely due to alcoholic liver disease, however would recommend obtaining serologies to rule out other causes of liver disease.    -- please obtain acute hepatitis panel, JOHNY, anti smooth muscle antibody, antimitochondrial antibody, total IgG, alpha-1 antitrypsin phenotype, ceruloplasmin, PETH if not done so already          Liver mass  - hepatology consulted. 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 -- Recommended obtaining tumor markers and if AFP is elevated this is likely HCC.  If unable to diagnosed by imaging and serology, might require liver biopsy.    -- please obtain AFP, CEA, CA 19 9  - Awaiting hematology and hepatology recs once IR and tumor board review of the case is completed to determine if patient will require additional workup or treatment in inpatient vs outpatient setting.       GERD (gastroesophageal reflux disease)  - continue home protonix     HTN (hypertension)  - controlled on admit  - continue amlodipine  5mg daily    Chronic deep vein thrombosis (DVT) of femoral vein  Leg pain   - on Xarelto 15mg daily at home, reports last dose was 6/16  - holding AC per heme/onc  - continue home gabapentin 300mg BID      VTE Risk Mitigation (From admission, onward)         Ordered     Reason for No Pharmacological VTE Prophylaxis  Once        Question:  Reasons:  Answer:  Already adequately anticoagulated on oral Anticoagulants    06/17/22 0828     IP VTE HIGH RISK PATIENT  Once         06/17/22 0828     Place sequential compression device  Until discontinued         06/17/22 0828                Discharge Planning   YANE: 6/22/2022     Code Status: Full Code   Is the patient medically ready for discharge?: No    Reason for patient still in hospital (select all that apply): Patient unstable, Patient trending condition, Laboratory test, Treatment and Consult recommendations  Discharge Plan A: Home with family          Time spent in care of patient: > 35 minutes           Jeremy Cheema MD  Department of Hospital Medicine   [facility] - Intensive Care ([place] [address])

## 2022-06-22 NOTE — SUBJECTIVE & OBJECTIVE
Interval History: Patient lying in bed, no acute distress. No acute events overnight. Patient reports abdominal pain moderately controlled. No other complaints. Awaiting hematology and hepatology recs once IR and tumor board review of the case is completed to determine if patient will require additional workup or treatment in inpatient vs outpatient setting.     Review of Systems   Constitutional:  Negative for activity change, chills, fatigue and fever.   HENT:  Negative for congestion and trouble swallowing.    Eyes:  Negative for visual disturbance.   Respiratory:  Negative for cough and shortness of breath.    Cardiovascular:  Positive for chest pain. Negative for leg swelling.   Gastrointestinal:  Positive for abdominal pain. Negative for abdominal distention, nausea and vomiting.   Endocrine: Negative for cold intolerance, heat intolerance, polydipsia and polyuria.   Genitourinary:  Negative for difficulty urinating and dysuria.   Musculoskeletal:  Negative for back pain and myalgias.   Skin:  Negative for rash and wound.   Neurological:  Negative for dizziness, weakness and light-headedness.   Hematological:  Negative for adenopathy. Does not bruise/bleed easily.   Psychiatric/Behavioral:  Negative for confusion and sleep disturbance.      Objective:     Vital Signs (Most Recent):  Temp: 98.9 °F (37.2 °C) (06/21/22 2140)  Pulse: 82 (06/21/22 2140)  Resp: 13 (06/21/22 2140)  BP: 123/76 (06/21/22 2140)  SpO2: (!) 92 % (06/21/22 2140) Vital Signs (24h Range):  Temp:  [98.7 °F (37.1 °C)-99.5 °F (37.5 °C)] 98.9 °F (37.2 °C)  Pulse:  [70-88] 82  Resp:  [13-37] 13  SpO2:  [92 %-96 %] 92 %  BP: (116-134)/(73-85) 123/76     Weight: 73.9 kg (163 lb)  Body mass index is 24.78 kg/m².    Intake/Output Summary (Last 24 hours) at 6/21/2022 2236  Last data filed at 6/21/2022 1600  Gross per 24 hour   Intake --   Output 825 ml   Net -825 ml        Physical Exam  Constitutional:       Appearance: He is well-developed.   HENT:       Head: Normocephalic and atraumatic.   Eyes:      General: No scleral icterus.     Pupils: Pupils are equal, round, and reactive to light.   Neck:      Vascular: No JVD.   Cardiovascular:      Rate and Rhythm: Normal rate and regular rhythm.      Heart sounds: No murmur heard.    No friction rub. No gallop.   Pulmonary:      Effort: Pulmonary effort is normal. No respiratory distress.      Breath sounds: Normal breath sounds. No wheezing or rales.   Abdominal:      General: Bowel sounds are normal. There is no distension.      Palpations: Abdomen is soft.      Tenderness: There is no abdominal tenderness. There is no guarding or rebound.   Musculoskeletal:         General: No deformity. Normal range of motion.      Cervical back: Neck supple.   Lymphadenopathy:      Cervical: No cervical adenopathy.   Skin:     General: Skin is warm and dry.      Capillary Refill: Capillary refill takes less than 2 seconds.      Findings: No erythema or rash.   Neurological:      Mental Status: He is alert and oriented to person, place, and time.      Cranial Nerves: No cranial nerve deficit.      Sensory: No sensory deficit.   Psychiatric:         Mood and Affect: Mood normal.       Significant Labs: All pertinent labs within the past 24 hours have been reviewed.    Significant Imaging: I have reviewed all pertinent imaging results/findings within the past 24 hours.

## 2022-06-22 NOTE — CONSULTS
"[facility] - [facility] ([place] [id number])  Cardiothoracic Surgery  Consult Note    Patient Name: Irving Linn  MRN: [id number]  Admission Date: 6/17/2022  Attending Physician: Jeremy Cheema MD  Referring Provider: Nat Lane MD    Patient information was obtained from patient, relative(s), past medical records and ER records.     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 male with PMHx significant for HTN,, GERD and DVT on Xarelto admitted to hospital medicine as a transfer from [facility] ED for abnormal CT imagine. Patient presented to [facility] ED complaining of shortness of breath that has progressively worsened over the last 8 months. Labs were largely unremarkable.  Chest x-ray revealed no acute abnormalities did not appreciate any infiltrates. Ultrasound of bilateral lower extremities revealed a nonocclusive left distal superficial partial femoral DVT. CTA chest revealed bilateral small pleural effusions, no PE. CT scan abdomen pelvis with IV contrast revealed "apparent soft tissue density involving the cephalad portion of the inferior vena cava and extending into the inferior portion of the right atrium. This measures 6 x 4 by 11.2 cm in AP medial-lateral and craniocaudal dimensions respectively. There is a fluid collection in the inferior right lobe of the liver measuring 4.5 by 4.2 cm." Radiologist at outside facility felt that there may be a mass in the right atrium possibly a myxoma that is the source of the clot. 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 CTS recommended transfer to [facility] for higher level of care.      On arrival, patient " reports minimal improvement of shortness of breath with supplemental oxygen. Endorses chest pain with deep breaths and unintentional weight loss over the last few months. Denies fever/chills, HA, cough, abdominal pain, n/v, diarrhea, constipation, urinary symptoms, weakness. AFVSS.       No current facility-administered medications on file prior to encounter.     Current Outpatient Medications on File Prior to Encounter   Medication Sig    albuterol (PROVENTIL/VENTOLIN HFA) 90 mcg/actuation inhaler Inhale 2 puffs into the lungs every 4 (four) hours as needed for Shortness of Breath.    amLODIPine (NORVASC) 5 MG tablet Take 5 mg by mouth every morning.    gabapentin (NEURONTIN) 300 MG capsule Take 300 mg by mouth 2 (two) times daily.    pantoprazole (PROTONIX) 40 MG tablet Take 40 mg by mouth every morning.    XARELTO 15 mg Tab Take 15 mg by mouth every morning.       Review of patient's allergies indicates:  No Known Allergies    No past medical history on file.  Past Surgical History:   Procedure Laterality Date    MANDIBLE FRACTURE SURGERY       Family History    None       Tobacco Use    Smoking status: Current Every Day Smoker     Packs/day: 1.00     Types: Cigarettes    Smokeless tobacco: Current User     Types: Chew   Substance and Sexual Activity    Alcohol use: No    Drug use: Yes    Sexual activity: Not on file     Comment: Crack     Review of Systems   Constitutional:  Positive for activity change.   HENT:  Negative for nosebleeds.    Eyes:  Negative for visual disturbance.   Respiratory:  Positive for shortness of breath.    Cardiovascular:  Negative for chest pain.   Gastrointestinal:  Negative for nausea.   Musculoskeletal:  Negative for gait problem.   Skin:  Negative for color change.   Neurological:  Negative for dizziness.   Hematological:  Does not bruise/bleed easily.   Psychiatric/Behavioral:  Negative for sleep disturbance.    Objective:     Vital Signs (Most Recent):  Temp: 98.9 °F  (37.2 °C) (06/22/22 1110)  Pulse: 99 (06/22/22 1110)  Resp: 20 (06/22/22 1110)  BP: 129/81 (06/22/22 0715)  SpO2: (!) 94 % (06/22/22 1110)   Vital Signs (24h Range):  Temp:  [98.6 °F (37 °C)-99.2 °F (37.3 °C)] 98.9 °F (37.2 °C)  Pulse:  [70-99] 99  Resp:  [13-20] 20  SpO2:  [92 %-95 %] 94 %  BP: (120-136)/(75-85) 129/81     Weight: 73.9 kg (163 lb)  Body mass index is 24.78 kg/m².    SpO2: (!) 94 %  O2 Device (Oxygen Therapy): room air     Intake/Output - Last 3 Shifts         06/20 0700  06/21 0659 06/21 0700 06/22 0659 06/22 0700 06/23 0659    P.O. 250      Total Intake(mL/kg) 250 (3.4)      Urine (mL/kg/hr) 650 (0.4) 575 (0.3) 225 (0.4)    Total Output 650 575 225    Net -400 -575 -225                    Lines/Drains/Airways       Peripheral Intravenous Line  Duration                  Peripheral IV - Single Lumen 06/16/22 1935 20 G Right Antecubital 5 days                     STS Risk Score: n/a    Physical Exam  Constitutional:       Appearance: Normal appearance.   HENT:      Head: Normocephalic and atraumatic.   Eyes:      Pupils: Pupils are equal, round, and reactive to light.   Cardiovascular:      Rate and Rhythm: Normal rate and regular rhythm.   Pulmonary:      Effort: No respiratory distress.   Musculoskeletal:         General: Normal range of motion.      Cervical back: Normal range of motion.   Skin:     Coloration: Skin is not pale.   Neurological:      General: No focal deficit present.      Mental Status: He is alert.   Psychiatric:         Mood and Affect: Mood normal.         Behavior: Behavior normal.       Significant Labs:  ABGs: No results for input(s): PH, PCO2, PO2, HCO3, POCSATURATED, BE in the last 48 hours.  Amylase: No results for input(s): AMYLASE in the last 48 hours.  BMP:   Recent Labs   Lab 06/22/22  1005   MG 1.6     Cardiac markers: No results for input(s): CKMB, CPKMB, TROPONINT, TROPONINI, MYOGLOBIN in the last 48 hours.  CBC:   Recent Labs   Lab 06/22/22  0330   WBC 4.35  "  RBC 2.83*   HGB 8.6*   HCT 25.8*   PLT 85*   MCV 91   MCH 30.4   MCHC 33.3     CMP: No results for input(s): GLU, CALCIUM, ALBUMIN, PROT, NA, K, CO2, CL, BUN, CREATININE, ALKPHOS, ALT, AST, BILITOT in the last 48 hours.  Coagulation: No results for input(s): PT, INR, APTT in the last 48 hours.  Lactic Acid: No results for input(s): LACTATE in the last 48 hours.  LFTs: No results for input(s): ALT, AST, ALKPHOS, BILITOT, PROT, ALBUMIN in the last 48 hours.  Lipase: No results for input(s): LIPASE in the last 48 hours.    Significant Diagnostics:  I have reviewed all pertinent imaging results/findings within the past 24 hours.      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 MRI showed a cirrhotic liver also. MELD 17.     Patient was seen last week by Dr. Pompa who recommend biopsy with IR or cardiology.     IR reports "biopsy can be performed in the outpatient setting. We can biopsy the liver lesion in segment 5.  We are unable to biopsy the lesion extending to the right atrium."      Heme/Onc following outpatient for possible systemic therapy.     Would not offer surgical removal. Continue with medical management. Dr. Reinoso to review and staff.               Thank you for your consult. I will sign off. Please contact us if you have any additional questions.    Mylene Arreola PA-C  Cardiothoracic Surgery  [facility] - Intensive Care ([place])  "

## 2022-06-22 NOTE — TELEPHONE ENCOUNTER
"Patient: Irving Linn       MRN: [id number]      : 1960     Age: 61 y.o.  [address] [facility] [place] [address]     Providers  Hepatologists: Letty Argueta MD  Surgeons: none  Radiologists: none  Advanced Practice providers:      Priority of review: Cancer-inpt; AFP 8000     Patient Transplant Status: Not a candidate     Reason for presentation: Other initial review for evaluation and management     Clinical Summary: Irving Linn is a 61 y.o. male with history of HCV, hypertension, DVT on Xarelto presents to the hospital as a transfer from [facility] for abnormal CT imaging.  Initially presented there for shortness of breath.  No obvious cause is found on chest x-ray, he was found to have a superficial partial femoral DVT on ultrasound. CT scan abdomen pelvis with IV contrast revealed "apparent soft tissue density involving the cephalad portion of the inferior vena cava and extending into the inferior portion of the right atrium. This measures 6 x 4 by 11.2 cm in AP medial-lateral and craniocaudal dimensions respectively. There is a fluid collection in the inferior right lobe of the liver measuring 4.5 by 4.2 cm." He was transferred here for further evaluation.     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     Imaging to be reviewed: MRI abdo/pelvis; ct abdo/pelvis; CTA chest     HCC Treatment History: n/a     ABO:      Platelets:         Lab Results   Component Value Date/Time     PLT 94 (L) 2022 05:54 AM      Creatinine:         Lab Results   Component Value Date/Time     CREATININE 0.9 2022 05:54 AM      Bilirubin:         Lab Results   Component Value Date/Time     BILITOT 2.5 (H) 2022 05:54 AM      AFP Last 3 each if available:         Lab Results   Component Value Date/Time     AFP 8114 (H) 2022 10:41 AM         MELD: MELD-Na score: 17 at " 6/19/2022  5:54 AM  MELD score: 14 at 6/19/2022  5:54 AM  Calculated from:  Serum Creatinine: 0.9 mg/dL (Using min of 1 mg/dL) at 6/19/2022  5:54 AM  Serum Sodium: 134 mmol/L at 6/19/2022  5:54 AM  Total Bilirubin: 2.5 mg/dL at 6/19/2022  5:54 AM  INR(ratio): 1.4 at 6/17/2022  9:14 AM  Age: 61 years     Plan: R hepatic lobe mass with additional mass extending into the IVC and RA.  Enhancement not quite typical for HCC, but given AFP >8000, it almost certainly is HCC.  Could consider Y90 although liver function is marginal.  Can also consider systemic therapy and cardiology consult.  I have heard of people trying to mechanically remove some of these tumors that extend into the RA, but any sort of intervention like that is usually palliative only.      Lesion in the inferior right lobe and an additional lesion  That involving rt hepatic vein that extends into the  IVC and the rt atrium. This will cause problems with heart.  Y90 to shrink the tumor.  Oncology consulted for possible systemic treatment.    Can see oncology in the clinic when discharged, along with IR      Follow-up Provider: Dr Argueta

## 2022-06-22 NOTE — ASSESSMENT & PLAN NOTE
"CTS re-consulted at the request of heme/onc for palliative tumor removal of liver mass involving the IVC and extending up to RA. 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 MRI showed a cirrhotic liver also. MELD 17.     Patient was seen last week by Dr. Pompa who recommend biopsy with IR or cardiology.     IR reports "biopsy can be performed in the outpatient setting. We can biopsy the liver lesion in segment 5.  We are unable to biopsy the lesion extending to the right atrium."      Heme/Onc following outpatient for possible systemic therapy.     Would not offer surgical removal. Continue with medical management. Dr. Reinoso to review and staff.           "

## 2022-06-22 NOTE — PLAN OF CARE
Pt AAOX4. VSS. PRN Diluadid 1mg given for abdominal pain. Full relief obtained. Call light in reach.Will continue to monitor.

## 2022-06-23 NOTE — ASSESSMENT & PLAN NOTE
"Liver Mass  - presented to outside facility complaining of shortness of breath ~8 months  - CT A/p showed apparent soft tissue density involving the cephalad portion of the inferior vena cava and extending into the inferior portion of the right atrium. This measures 6 x 4 by 11.2 cm in AP medial-lateral and craniocaudal dimensions respectively. There is a fluid collection in the inferior right lobe of the liver measuring 4.5 by 4.2 cm.  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

## 2022-06-23 NOTE — PLAN OF CARE
Pt AAOX4. Discharge information reviewed with patient. Pt verbalizes understanding. Pneumonia vaccine administered. Pt transported to private vehicle.

## 2022-06-23 NOTE — ASSESSMENT & PLAN NOTE
- hepatology consulted. 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 -- Recommended obtaining tumor markers and if AFP is elevated this is likely HCC.  If unable to diagnosed by imaging and serology, might require liver biopsy.    -- please obtain AFP, CEA, CA 19 9  - heme/onc consulted. apprec recs  -- Patient's mass is concerning for atypical HCC with tumor thrombus   --. Agree with obtaining AFP, hepatitis serologies, auto-immune panel.  -- Would not recommend anticoagulation for tumor thrombus. May need IR for prophylactic stent placement of IVC given near occlusion of RA.   -- Patient will be seen in Outpatient Oncology Clinic to determine if patient is a candidate for systemic therapy.  Will refer outpatient to IR for possibility of radioembolization, although liver function may impede this.  - IR consulted, appreciate recs  -- IR biopsy can be performed in the outpatient setting. We can biopsy the liver lesion in segment 5.  We are unable to biopsy the lesion extending to the right atrium.   - CTS consulted to evaluate for palliative tumor removal of liver mass. followup recs  -- Given his cirrhosis he is not an operative canddiate and would be unlikely to survive the cardiopulmonary bypass run required to perform a biopsy or removal of this mass.  --patient is debilitated and frail which is also a risk factor for death and morbidity after cardiac surgery. Unfortunately no procedure can be offered.   - Patient and family interest in talking with palliative care as well. consult palliative care in the AM.

## 2022-06-23 NOTE — DISCHARGE INSTRUCTIONS
You were admitted for further evaluation of your liver mass. You were seen by liver doctors, cancer doctors, and heart surgeons.     The heart surgeons were unable to perform surgery due to the location of the mass and your other health conditions. The cancer doctors want to see you as outpatient for biopsy of the mass with radiology and potential treatment. A referral to palliative care has been placed to aid in advanced care planning given extent of your liver mass.

## 2022-06-23 NOTE — SUBJECTIVE & OBJECTIVE
Interval History: Patient lying in bed, no acute distress. No acute events overnight. Patient reports abdominal pain moderately controlled. Also notes constipation. No other complaints.     Review of Systems   Constitutional:  Negative for activity change, chills, fatigue and fever.   HENT:  Negative for congestion and trouble swallowing.    Eyes:  Negative for visual disturbance.   Respiratory:  Negative for cough and shortness of breath.    Cardiovascular:  Positive for chest pain. Negative for leg swelling.   Gastrointestinal:  Positive for abdominal pain and constipation. Negative for abdominal distention, nausea and vomiting.   Endocrine: Negative for cold intolerance, heat intolerance, polydipsia and polyuria.   Genitourinary:  Negative for difficulty urinating and dysuria.   Musculoskeletal:  Negative for back pain and myalgias.   Skin:  Negative for rash and wound.   Neurological:  Negative for dizziness, weakness and light-headedness.   Hematological:  Negative for adenopathy. Does not bruise/bleed easily.   Psychiatric/Behavioral:  Negative for confusion and sleep disturbance.      Objective:     Vital Signs (Most Recent):  Temp: 98.4 °F (36.9 °C) (06/23/22 0425)  Pulse: 79 (06/23/22 0425)  Resp: 18 (06/22/22 2200)  BP: 107/61 (06/23/22 0425)  SpO2: 95 % (06/23/22 0425) Vital Signs (24h Range):  Temp:  [98.4 °F (36.9 °C)-100.1 °F (37.8 °C)] 98.4 °F (36.9 °C)  Pulse:  [76-99] 79  Resp:  [18-20] 18  SpO2:  [94 %-95 %] 95 %  BP: (106-121)/(61-80) 107/61     Weight: 73.9 kg (163 lb)  Body mass index is 24.78 kg/m².    Intake/Output Summary (Last 24 hours) at 6/23/2022 0710  Last data filed at 6/23/2022 0500  Gross per 24 hour   Intake --   Output 300 ml   Net -300 ml        Physical Exam  Constitutional:       Appearance: He is well-developed.   HENT:      Head: Normocephalic and atraumatic.   Eyes:      General: No scleral icterus.     Pupils: Pupils are equal, round, and reactive to light.   Neck:       Vascular: No JVD.   Cardiovascular:      Rate and Rhythm: Normal rate and regular rhythm.      Heart sounds: No murmur heard.    No friction rub. No gallop.   Pulmonary:      Effort: Pulmonary effort is normal. No respiratory distress.      Breath sounds: Normal breath sounds. No wheezing or rales.   Abdominal:      General: Bowel sounds are normal. There is no distension.      Palpations: Abdomen is soft.      Tenderness: There is no abdominal tenderness. There is no guarding or rebound.   Musculoskeletal:         General: No deformity. Normal range of motion.      Cervical back: Neck supple.   Lymphadenopathy:      Cervical: No cervical adenopathy.   Skin:     General: Skin is warm and dry.      Capillary Refill: Capillary refill takes less than 2 seconds.      Findings: No erythema or rash.   Neurological:      Mental Status: He is alert and oriented to person, place, and time.      Cranial Nerves: No cranial nerve deficit.      Sensory: No sensory deficit.   Psychiatric:         Mood and Affect: Mood normal.       Significant Labs: All pertinent labs within the past 24 hours have been reviewed.    Significant Imaging: I have reviewed all pertinent imaging results/findings within the past 24 hours.

## 2022-06-23 NOTE — PROGRESS NOTES
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 male with PMHx significant for HTN,, GERD and DVT on Xarelto admitted to hospital medicine as a transfer from [facility] ED for abnormal CT imagine. Patient presented to [facility] ED complaining of shortness of breath that has progressively worsened over the last 8 months. Labs were largely unremarkable.  Chest x-ray revealed no acute abnormalities did not appreciate any infiltrates. Ultrasound of bilateral lower extremities revealed a nonocclusive left distal superficial partial femoral DVT. CTA chest revealed bilateral small pleural effusions, no PE. CT scan abdomen pelvis with IV contrast revealed "apparent soft tissue density involving the cephalad portion of the inferior vena cava and extending into the inferior portion of the right atrium. This measures 6 x 4 by 11.2 cm in AP medial-lateral and craniocaudal dimensions respectively. There is a fluid collection in the inferior right lobe of the liver measuring 4.5 by 4.2 cm." Radiologist at outside facility felt that there may be a mass in the right atrium possibly a myxoma that is the source of the clot. 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 CTS recommended transfer to [facility] [place] for higher level of care.     On arrival, patient reports minimal improvement of shortness of breath with supplemental oxygen. Endorses chest pain with deep breaths and unintentional weight loss over the last few months. Denies fever/chills, HA, cough, abdominal " pain, n/v, diarrhea, constipation, urinary symptoms, weakness. AFVSS.       Overview/Hospital Course:  No notes on file    Interval History: Patient lying in bed, no acute distress. No acute events overnight. Patient reports abdominal pain moderately controlled. Also notes constipation. No other complaints.     Review of Systems   Constitutional:  Negative for activity change, chills, fatigue and fever.   HENT:  Negative for congestion and trouble swallowing.    Eyes:  Negative for visual disturbance.   Respiratory:  Negative for cough and shortness of breath.    Cardiovascular:  Positive for chest pain. Negative for leg swelling.   Gastrointestinal:  Positive for abdominal pain and constipation. Negative for abdominal distention, nausea and vomiting.   Endocrine: Negative for cold intolerance, heat intolerance, polydipsia and polyuria.   Genitourinary:  Negative for difficulty urinating and dysuria.   Musculoskeletal:  Negative for back pain and myalgias.   Skin:  Negative for rash and wound.   Neurological:  Negative for dizziness, weakness and light-headedness.   Hematological:  Negative for adenopathy. Does not bruise/bleed easily.   Psychiatric/Behavioral:  Negative for confusion and sleep disturbance.      Objective:     Vital Signs (Most Recent):  Temp: 98.4 °F (36.9 °C) (06/23/22 0425)  Pulse: 79 (06/23/22 0425)  Resp: 18 (06/22/22 2200)  BP: 107/61 (06/23/22 0425)  SpO2: 95 % (06/23/22 0425) Vital Signs (24h Range):  Temp:  [98.4 °F (36.9 °C)-100.1 °F (37.8 °C)] 98.4 °F (36.9 °C)  Pulse:  [76-99] 79  Resp:  [18-20] 18  SpO2:  [94 %-95 %] 95 %  BP: (106-121)/(61-80) 107/61     Weight: 73.9 kg (163 lb)  Body mass index is 24.78 kg/m².    Intake/Output Summary (Last 24 hours) at 6/23/2022 0739  Last data filed at 6/23/2022 0500  Gross per 24 hour   Intake --   Output 300 ml   Net -300 ml        Physical Exam  Constitutional:       Appearance: He is well-developed.   HENT:      Head: Normocephalic and  "atraumatic.   Eyes:      General: No scleral icterus.     Pupils: Pupils are equal, round, and reactive to light.   Neck:      Vascular: No JVD.   Cardiovascular:      Rate and Rhythm: Normal rate and regular rhythm.      Heart sounds: No murmur heard.    No friction rub. No gallop.   Pulmonary:      Effort: Pulmonary effort is normal. No respiratory distress.      Breath sounds: Normal breath sounds. No wheezing or rales.   Abdominal:      General: Bowel sounds are normal. There is no distension.      Palpations: Abdomen is soft.      Tenderness: There is no abdominal tenderness. There is no guarding or rebound.   Musculoskeletal:         General: No deformity. Normal range of motion.      Cervical back: Neck supple.   Lymphadenopathy:      Cervical: No cervical adenopathy.   Skin:     General: Skin is warm and dry.      Capillary Refill: Capillary refill takes less than 2 seconds.      Findings: No erythema or rash.   Neurological:      Mental Status: He is alert and oriented to person, place, and time.      Cranial Nerves: No cranial nerve deficit.      Sensory: No sensory deficit.   Psychiatric:         Mood and Affect: Mood normal.       Significant Labs: All pertinent labs within the past 24 hours have been reviewed.    Significant Imaging: I have reviewed all pertinent imaging results/findings within the past 24 hours.      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 This measures 6 x 4 by 11.2 cm in AP medial-lateral and craniocaudal dimensions respectively. There is a fluid collection in the inferior right lobe of the liver measuring 4.5 by 4.2 cm.  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 apprec recs  -- newly diagnosed cirrhosis, likely due to alcoholic liver disease, however would recommend obtaining serologies to rule out other causes of liver disease.    -- please obtain acute hepatitis panel, JOHNY, anti smooth muscle antibody, antimitochondrial antibody, total IgG, alpha-1 antitrypsin phenotype, ceruloplasmin, PETH if not done so already          Liver mass  - hepatology consulted. 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 -- Recommended obtaining tumor markers and if AFP is elevated this is likely HCC.  If unable to diagnosed by imaging and serology, might require liver biopsy.    -- please obtain AFP, CEA, CA 19 9  - heme/onc consulted. apprec recs  -- Patient's mass is concerning for atypical HCC with tumor thrombus   --. Agree with obtaining AFP, hepatitis serologies, auto-immune panel.  -- Would not recommend anticoagulation for tumor thrombus. May need IR for prophylactic stent placement of IVC given near occlusion of RA.   -- Patient will be seen in Outpatient Oncology Clinic to determine if patient is a candidate for systemic therapy.  Will refer outpatient to IR for possibility of radioembolization, although liver function may impede this.  - IR consulted, appreciate recs  -- IR biopsy can be performed in the outpatient setting. We can biopsy the liver lesion in segment 5.  We are unable to biopsy the lesion extending to the right atrium.   - CTS consulted to evaluate for palliative tumor removal of liver mass. followup recs  -- Given his cirrhosis he is not an operative canddiate and would be unlikely to survive the cardiopulmonary bypass run required to perform a biopsy or removal of this mass.  --patient is debilitated and frail which is also a risk factor for death and morbidity after cardiac surgery. Unfortunately no procedure can be offered.   - " Patient and family interest in talking with palliative care as well. consult palliative care in the AM.      GERD (gastroesophageal reflux disease)  - continue home protonix     HTN (hypertension)  - controlled on admit  - continue amlodipine 5mg daily    Chronic deep vein thrombosis (DVT) of femoral vein  Leg pain   - on Xarelto 15mg daily at home, reports last dose was 6/16  - holding AC per heme/onc  - continue home gabapentin 300mg BID      VTE Risk Mitigation (From admission, onward)         Ordered     Reason for No Pharmacological VTE Prophylaxis  Once        Question:  Reasons:  Answer:  Already adequately anticoagulated on oral Anticoagulants    06/17/22 0828     IP VTE HIGH RISK PATIENT  Once         06/17/22 0828     Place sequential compression device  Until discontinued         06/17/22 0828                Discharge Planning   YANE: 6/22/2022     Code Status: Full Code   Is the patient medically ready for discharge?: No    Reason for patient still in hospital (select all that apply): Patient trending condition, Laboratory test, Treatment and Consult recommendations  Discharge Plan A: Home with family            Time spent in care of patient: > 35 minutes         Jeremy Cheema MD  Department of Hospital Medicine   [facility] - Intensive Care ([place]-)

## 2022-06-24 NOTE — DISCHARGE SUMMARY
"[facility] - Intensive Care ([place] [address])  [facility] Medicine  Discharge Summary      Patient Name: Irving Linn  MRN: [id number]  Patient Class: OP- Observation  Admission Date: 6/17/2022  Hospital Length of Stay: 4 days  Discharge Date and Time: 6/23/2022  6:00 PM  Attending Physician: Melissa att. providers found   Discharging Provider: Jose Joyce MD  Primary Care Provider: Primary Doctor Melissa  [facility] Medicine Team: [facility] R Jose Joyce MD    HPI:   Irving Linn is a 61 y.o. male with PMHx significant for HTN,, GERD and DVT on Xarelto admitted to hospital medicine as a transfer from [facility] ED for abnormal CT imagine. Patient presented to [facility] ED complaining of shortness of breath that has progressively worsened over the last 8 months. Labs were largely unremarkable.  Chest x-ray revealed no acute abnormalities did not appreciate any infiltrates. Ultrasound of bilateral lower extremities revealed a nonocclusive left distal superficial partial femoral DVT. CTA chest revealed bilateral small pleural effusions, no PE. CT scan abdomen pelvis with IV contrast revealed "apparent soft tissue density involving the cephalad portion of the inferior vena cava and extending into the inferior portion of the right atrium. This measures 6 x 4 by 11.2 cm in AP medial-lateral and craniocaudal dimensions respectively. There is a fluid collection in the inferior right lobe of the liver measuring 4.5 by 4.2 cm." Radiologist at outside facility felt that there may be a mass in the right atrium possibly a myxoma that is the source of the clot. 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 CTS recommended [place] [facility] [place] level of care.     On arrival, patient reports minimal improvement of shortness of breath with supplemental oxygen. Endorses chest pain with deep breaths and unintentional " weight loss over the last few months. Denies fever/chills, HA, cough, abdominal pain, n/v, diarrhea, constipation, urinary symptoms, weakness. AFVSS.       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   -- AFP 8114, CA 19-9 <2.1, CEA 11.9  - Heme/onc consulted. apprec recs  -- Patient's mass is concerning for atypical HCC with tumor thrombus   -- Recommend against anticoagulation for tumor thrombus.   -- Patient will be seen in Outpatient Oncology Clinic to determine if patient is a candidate for systemic therapy.   -- Appointment on 6/30    - IR consulted, appreciate recs  -- IR biopsy can be performed in the outpatient setting. We can biopsy the liver lesion in segment 5.  We are unable to biopsy the lesion extending to the right atrium.   -- Referral placed outpatient to IR for possibility of radioembolization, although liver function may impede this per inpt consult  -- IR consulted for prophylactic stent placement of IVC given near occlusion of RA. Deferring to outpatient evaluation.    - CTS consulted to evaluate for palliative tumor removal of liver mass. followup recs  -- Given his cirrhosis he is not an operative canddiate and would be unlikely to survive the cardiopulmonary bypass run required to perform a biopsy or removal of this mass.  -- Patient is debilitated and frail which is also a risk factor for death and morbidity after cardiac surgery. Unfortunately no procedure can be offered.   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 This measures 6 x 4 by 11.2 cm in AP medial-lateral and craniocaudal dimensions respectively. There is a fluid collection in the inferior right lobe of the liver measuring 4.5 by 4.2 cm.  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  - See "liver mass"       Cirrhosis  Hepatology consulted  -- newly diagnosed cirrhosis, likely due to alcoholic liver disease  -- Work up with acute hepatitis panel with positive hep C antibody, JOHNY negative, anti smooth muscle antibody positive, antimitochondrial antibody negative, total IgG elevated, alpha-1 antitrypsin phenotype slightly elevated, ceruloplasmin negative, PETH negative  -- Will need outpatient hepatology follow up      GERD (gastroesophageal reflux disease)  - Continue home protonix        HTN (hypertension)  - Continue amlodipine 5mg daily       Chronic deep vein thrombosis (DVT) of femoral vein  Leg pain   - On Xarelto 15mg daily at home, reports last dose was 6/16  - Holding AC per heme/onc recommendations  - Continue home gabapentin 300mg BID         Goals of Care Treatment Preferences:  Code Status: Full Code      Consults:   Consults (From admission, onward)        Status Ordering Provider     Inpatient consult to Cardiothoracic Surgery  Once        Provider:  (Not yet assigned)    Completed ELKE HEBERT     Inpatient consult to Hepatology  Once        Provider:  (Not yet assigned)    Completed ELKE HEBERT     Inpatient consult to Hematology/Oncology  Once        Provider:  (Not yet assigned)    Completed ELKE HEBERT     Inpatient consult to Interventional Radiology  Once        Provider:  (Not yet assigned)    Completed MARY SIMMS     Inpatient consult to Cardiothoracic Surgery  Once        Provider:  (Not yet assigned)    Completed MARY SIMMS          No new Assessment & Plan notes have been filed under this hospital service since the last note was " generated.  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    Why: Established pt's hospital f/u visit on 7/26/22 @ 9:30am. Please bring discharge summary, ID, insurance card, and medication list.    [address] [address]  [phone]                     Patient Instructions:      Ambulatory referral/consult to CLINIC Palliative Care   Standing Status: Future   Referral Priority: Routine Referral Type: Consultation   Requested Specialty: Palliative Medicine   Number of Visits Requested: 1     Ambulatory referral/consult to Internal Medicine   Standing Status: Future   Referral Priority: Routine Referral Type: Consultation   Referral Reason: Specialty Services Required   Requested Specialty: Internal Medicine   Number of Visits Requested: 1     Ambulatory referral/consult to Interventional Radiology   Standing Status: Future   Referral Priority: Routine Referral Type: Consultation   Referral Reason: Specialty Services Required   Requested Specialty: Interventional Radiology   Number of Visits Requested: 1     Diet Adult Regular     Notify your health care provider if you experience any of the following:  increased confusion or weakness     Notify your health care provider if you experience any of the following:  persistent dizziness, light-headedness, or visual disturbances     Notify  your health care provider if you experience any of the following:  worsening rash     Notify your health care provider if you experience any of the following:  severe persistent headache     Notify your health care provider if you experience any of the following:  difficulty breathing or increased cough     Notify your health care provider if you experience any of the following:  redness, tenderness, or signs of infection (pain, swelling, redness, odor or green/yellow discharge around incision site)     Notify your health care provider if you experience any of the following:  severe uncontrolled pain     Notify your health care provider if you experience any of the following:  persistent nausea and vomiting or diarrhea     Notify your health care provider if you experience any of the following:  temperature >100.4     Activity as tolerated       Significant Diagnostic Studies: Labs: All labs within the past 24 hours have been reviewed    Pending Diagnostic Studies:     None         Medications:  Reconciled Home Medications:      Medication List      CONTINUE taking these medications    albuterol 90 mcg/actuation inhaler  Commonly known as: PROVENTIL/VENTOLIN HFA  Inhale 2 puffs into the lungs every 4 (four) hours as needed for Shortness of Breath.     amLODIPine 5 MG tablet  Commonly known as: NORVASC  Take 5 mg by mouth every morning.     gabapentin 300 MG capsule  Commonly known as: NEURONTIN  Take 300 mg by mouth 2 (two) times daily.     pantoprazole 40 MG tablet  Commonly known as: PROTONIX  Take 40 mg by mouth every morning.        STOP taking these medications    levoFLOXacin 750 MG tablet  Commonly known as: LEVAQUIN     XARELTO 15 mg Tab  Generic drug: rivaroxaban            Indwelling Lines/Drains at time of discharge:   Lines/Drains/Airways     None                 Time spent on the discharge of patient: 35 minutes         Jose Joyce MD  Department of Hospital Medicine  [facility]  ([facility])

## 2022-06-24 NOTE — HOSPITAL COURSE
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   -- AFP 8114, CA 19-9 <2.1, CEA 11.9  - Heme/onc consulted. apprec recs  -- Patient's mass is concerning for atypical HCC with tumor thrombus   -- Recommend against anticoagulation for tumor thrombus.   -- Patient will be seen in Outpatient Oncology Clinic to determine if patient is a candidate for systemic therapy.   -- Appointment on 6/30    - IR consulted, appreciate recs  -- IR biopsy can be performed in the outpatient setting. We can biopsy the liver lesion in segment 5.  We are unable to biopsy the lesion extending to the right atrium.   -- Referral placed outpatient to IR for possibility of radioembolization, although liver function may impede this per inpt consult  -- IR consulted for prophylactic stent placement of IVC given near occlusion of RA. Deferring to outpatient evaluation.    - CTS consulted to evaluate for palliative tumor removal of liver mass. followup recs  -- Given his cirrhosis he is not an operative canddiate and would be unlikely to survive the cardiopulmonary bypass run required to perform a biopsy or removal of this mass.  -- Patient is debilitated and frail which is also a risk factor for death and morbidity after cardiac surgery. Unfortunately no procedure can be offered.   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 This measures 6 x 4 by 11.2 cm in AP medial-lateral and craniocaudal dimensions respectively. There is a fluid collection in the  "inferior right lobe of the liver measuring 4.5 by 4.2 cm.  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  - See "liver mass"       Cirrhosis  Hepatology consulted  -- newly diagnosed cirrhosis, likely due to alcoholic liver disease  -- Work up with acute hepatitis panel with positive hep C antibody, JOHNY negative, anti smooth muscle antibody positive, antimitochondrial antibody negative, total IgG elevated, alpha-1 antitrypsin phenotype slightly elevated, ceruloplasmin negative, PETH negative  -- Will need outpatient hepatology follow up      GERD (gastroesophageal reflux disease)  - Continue home protonix        HTN (hypertension)  - Continue amlodipine 5mg daily       Chronic deep vein thrombosis (DVT) of femoral vein  Leg pain   - On Xarelto 15mg daily at home, reports last dose was 6/16  - Holding AC per heme/onc recommendations  - Continue home gabapentin 300mg BID    "

## 2022-06-24 NOTE — NURSING
Oncology Navigation   Intake  Cancer Type: GI  Internal / External Referral: Internal  Referral Source: Yady Whitmore  Date of Referral: 6/23/2022  Initial Nurse Navigator Contact: 6/24/2022  Referral to Initial Contact Timeline (days): 1  Date Worked: 6/24/2022  First Appointment Available: 6/28/2022  Appointment Date: 6/28/2022  First Available Date vs. Scheduled Date (days): 0  Multiple appointments: No     Treatment  Current Status: Staging work-up  Date Presented to Tumor Board: 6/21/2022 (transplant conference)  Presentation Notes: Plan: R hepatic lobe mass with additional mass extending into the IVC and RA.  Enhancement not quite typical for HCC, but given AFP >8000, it almost certainly is HCC.  Could consider Y90 although liver function is marginal.  Can also consider systemic therapy and cardiology consult.  I have heard of people trying to mechanically remove some of these tumors that extend into the RA, but any sort of intervention like that is usually palliative only.       Medical Oncologist: Srinivas Coronel  Consult Date: 6/28/2022       Procedures: CT; MRI; Ultrasound; Other  CT Schedule Date: 6/16/2022  MRI Schedule Date: 6/17/2022  Ultrasound Schedule Date: 6/17/2022  Other Schedule Date: 6/16/2022 (CTA)             Support Systems: Family members     Acuity      Follow Up  No follow-ups on file.

## 2022-06-24 NOTE — TELEPHONE ENCOUNTER
----- Message from Jaleesa Gaviria, Patient Care Assistant sent at 6/24/2022 12:45 PM CDT -----  Type: Needs Medical Advice  Who Called:  paz  Best Call Back Number: [phone]  Additional Information: paz states her brother has a referral  in charts for different providers and would  like all them appointments in [place] if possible ,. She  would like  the 6/30 appointment  to be switched to here if possible , please call to further discuss, thank you

## 2022-06-24 NOTE — TELEPHONE ENCOUNTER
Spoke with patient's sister, his caregiver, to schedule his oncology visit and palliative care visit. Reached out to Dr. Coronel to ask if I could add him on to a day next week for him, and he agreed to put him on Tuesday, June 28,  afternoon. Mrs. Trivedi agreed. They also accepted first available date of July 1st for palliative care visit with Bee Granger NP here at the cancer center.   Date, time, and location confirmed. Provided Mrs. Trivedi with a call back number for any questions.    Called [facility] IR department to confirm a referral I received for a liver biopsy to assure he gets scheduled; LVM on their mailbox. REF# [id number]

## 2022-06-28 NOTE — PROGRESS NOTES
PATIENT: Irving Linn  MRN: [id number]  DATE: 6/28/2022      Diagnosis:   1. Liver mass    2. Hepatitis C virus infection without hepatic coma, unspecified chronicity    3. Normocytic anemia    4. Thrombocytopenia    5. Hypertension, unspecified type        Chief Complaint: Establish Care (New pt; referred for liver mass )      Subjective:    Initial History: Mr. Linn is a 61 y.o. male with Cirrhosis, GERD, HTN, Hepatitis C who presents for a liver mass.  The patient initially presented to the ED on 6/16/22 with complaint of SOB.  US of the LE 6/16/22 showed a nonocclusive thrombus in the distal superficial femoral vein on the left.  CTA C/A/P on 6/16/22 showed a mass in the proximal IVC extending into the right atrium highly suspicious for malignancy and a rounded lesion in the inferior right lobe of the liver.  US of the abdomen on 6/17/22 showed a 4.8 x 4.6 for 5.2 cm heterogeneous lesion in the right hepatic lobe; echogenic lesion within the IVC measuring 4.9 x 3.3 x 4.2 cm.  MRI of the abdomen and pelvis 6/17/22 showed large heterogeneously enhancing mass within the IVC in standing and the right atrium measuring 7.6 x 5.9 x 4 cm with occlusion of the right hepatic vein; hypoenhancing mass within the inferior right hepatic lobe measuring 5.6 x 5.1 cm; several mildly enlarged re hepatic lymph nodes measuring 1.4 cm; and diffusely lobe marrow signal concerning for marrow replacement process in the bones.    Currently the patient endorses SOB, cough, and chest pain described as constant and rated 7-8/10.  He also endorses abdominal pain and occasional difficulty swallowing.  The patient denies N/V, constipation, diarrhea.  The patient denies fever, chills, night sweats, weight loss, new lumps or bumps, easy bruising or bleeding.    Past Medical History:   Past Medical History:   Diagnosis Date    Cirrhosis     GERD (gastroesophageal reflux disease)     HTN (hypertension)        Past Surgical HIstory:   Past  Surgical History:   Procedure Laterality Date    MANDIBLE FRACTURE SURGERY         Family History:   Family History   Problem Relation Age of Onset    Lung cancer Mother     Cervical cancer Mother     Bladder Cancer Sister     Breast cancer Sister        Social History:  reports that he has quit smoking. His smoking use included cigarettes. He has a 20.00 pack-year smoking history. He has quit using smokeless tobacco.  His smokeless tobacco use included chew. He reports current drug use. He reports that he does not drink alcohol.    Allergies:  Review of patient's allergies indicates:  No Known Allergies    Medications:  Current Outpatient Medications   Medication Sig Dispense Refill    albuterol (PROVENTIL/VENTOLIN HFA) 90 mcg/actuation inhaler Inhale 2 puffs into the lungs every 4 (four) hours as needed for Shortness of Breath.      amLODIPine (NORVASC) 5 MG tablet Take 5 mg by mouth every morning.      gabapentin (NEURONTIN) 300 MG capsule Take 300 mg by mouth 2 (two) times daily.      pantoprazole (PROTONIX) 40 MG tablet Take 40 mg by mouth every morning.       No current facility-administered medications for this visit.       Review of Systems   Constitutional: Negative for appetite change, chills, fever and unexpected weight change.   HENT: Positive for trouble swallowing (on occasion). Negative for mouth sores and sore throat.    Eyes: Negative for photophobia and visual disturbance.   Respiratory: Positive for cough and shortness of breath. Negative for chest tightness.    Cardiovascular: Negative for chest pain, palpitations and leg swelling.   Gastrointestinal: Positive for abdominal pain. Negative for constipation, diarrhea, nausea and vomiting.   Endocrine: Negative for cold intolerance and heat intolerance.   Genitourinary: Negative for difficulty urinating, dysuria, frequency and hematuria.   Musculoskeletal: Negative for arthralgias, back pain and myalgias.   Skin: Negative for color change  "and rash.   Neurological: Negative for dizziness, light-headedness, numbness and headaches.   Hematological: Negative for adenopathy.   Psychiatric/Behavioral: The patient is not nervous/anxious.        ECOG Performance Status: 1   Objective:      Vitals:   Vitals:    06/28/22 1426   BP: 120/84   BP Location: Left arm   Patient Position: Sitting   BP Method: Medium (Manual)   Pulse: 81   Temp: 97.7 °F (36.5 °C)   TempSrc: Temporal   SpO2: 98%   Weight: 75.2 kg (165 lb 12.6 oz)   Height: 5' 8" (1.727 m)       Physical Exam  Constitutional:       General: He is not in acute distress.     Appearance: He is well-developed. He is not diaphoretic.   HENT:      Head: Normocephalic and atraumatic.   Eyes:      General: No scleral icterus.        Right eye: No discharge.         Left eye: No discharge.   Cardiovascular:      Rate and Rhythm: Normal rate and regular rhythm.      Heart sounds: Normal heart sounds. No murmur heard.    No friction rub. No gallop.   Pulmonary:      Effort: Pulmonary effort is normal. No respiratory distress.      Breath sounds: Normal breath sounds. No wheezing or rales.   Chest:      Chest wall: No tenderness.   Breasts:      Right: No axillary adenopathy or supraclavicular adenopathy.      Left: No axillary adenopathy or supraclavicular adenopathy.       Abdominal:      General: Bowel sounds are normal. There is no distension.      Palpations: Abdomen is soft. There is no mass.      Tenderness: There is abdominal tenderness. There is no rebound.   Musculoskeletal:         General: No tenderness. Normal range of motion.   Lymphadenopathy:      Cervical: No cervical adenopathy.      Upper Body:      Right upper body: No supraclavicular or axillary adenopathy.      Left upper body: No supraclavicular or axillary adenopathy.   Skin:     General: Skin is warm and dry.      Findings: No erythema or rash.   Neurological:      Mental Status: He is alert and oriented to person, place, and time.      " Coordination: Coordination normal.   Psychiatric:         Behavior: Behavior normal.         Laboratory Data:  Lab Visit on 06/28/2022   Component Date Value Ref Range Status    WBC 06/28/2022 4.92  3.90 - 12.70 K/uL Final    RBC 06/28/2022 3.31 (A) 4.60 - 6.20 M/uL Final    Hemoglobin 06/28/2022 9.7 (A) 14.0 - 18.0 g/dL Final    Hematocrit 06/28/2022 31.9 (A) 40.0 - 54.0 % Final    MCV 06/28/2022 96  82 - 98 fL Final    MCH 06/28/2022 29.3  27.0 - 31.0 pg Final    MCHC 06/28/2022 30.4 (A) 32.0 - 36.0 g/dL Final    RDW 06/28/2022 15.9 (A) 11.5 - 14.5 % Final    Platelets 06/28/2022 103 (A) 150 - 450 K/uL Final    MPV 06/28/2022 12.2  9.2 - 12.9 fL Final    Immature Granulocytes 06/28/2022 0.2  0.0 - 0.5 % Final    Gran # (ANC) 06/28/2022 2.3  1.8 - 7.7 K/uL Final    Immature Grans (Abs) 06/28/2022 0.01  0.00 - 0.04 K/uL Final    Comment: Mild elevation in immature granulocytes is non specific and   can be seen in a variety of conditions including stress response,   acute inflammation, trauma and pregnancy. Correlation with other   laboratory and clinical findings is essential.      Lymph # 06/28/2022 1.8  1.0 - 4.8 K/uL Final    Mono # 06/28/2022 0.5  0.3 - 1.0 K/uL Final    Eos # 06/28/2022 0.2  0.0 - 0.5 K/uL Final    Baso # 06/28/2022 0.07  0.00 - 0.20 K/uL Final    nRBC 06/28/2022 0  0 /100 WBC Final    Gran % 06/28/2022 47.5  38.0 - 73.0 % Final    Lymph % 06/28/2022 36.2  18.0 - 48.0 % Final    Mono % 06/28/2022 11.0  4.0 - 15.0 % Final    Eosinophil % 06/28/2022 3.7  0.0 - 8.0 % Final    Basophil % 06/28/2022 1.4  0.0 - 1.9 % Final    Differential Method 06/28/2022 Automated   Final    Platelets 06/28/2022 103 (A) 150 - 450 K/uL Final    MPV 06/28/2022 12.2  9.2 - 12.9 fL Final    Pathologist Review 06/28/2022 Review required   Final    LD 06/28/2022 367 (A) 110 - 260 U/L Final    Results are increased in hemolyzed samples.    Haptoglobin 06/28/2022 59  30 - 250 mg/dL Final     Retic 06/28/2022 2.2 (A) 0.4 - 2.0 % Final    Ferritin 06/28/2022 979 (A) 20.0 - 300.0 ng/mL Final    Iron 06/28/2022 57  45 - 160 ug/dL Final    Transferrin 06/28/2022 207  200 - 375 mg/dL Final    TIBC 06/28/2022 306  250 - 450 ug/dL Final    Saturated Iron 06/28/2022 19 (A) 20 - 50 % Final   No results displayed because visit has over 200 results.            Imaging: Reviewed    MRI of the abdomen and pelvis 6/17/22 showed  Image quality is degraded by motion artifact.     Inferior Thorax: Small bilateral pleural effusions.  Few bibasilar patchy opacities, better appreciated on prior CT.     Liver: Upper limit of normal size.  Nodular contour suggestive for cirrhosis.  Large heterogeneously enhancing diffusion restricting soft tissue mass centered within the IVC extending to the right atrium.  Lesion measures approximately 7.6 x 5.9 x 4.0 cm (series 24, image 32; series 22, image 22).  Mass extends into and occludes the right hepatic vein.  Hypoenhancing diffusion restricting mass within the inferior right hepatic lobe segment 5 measuring 5.6 x 5.1 cm (series 22, image 53).  Portal veins appear patent.     Gallbladder: Gallbladder is contracted and contains multiple stones.  Hepatic segment 5 mass closely abuts and may involve the adjacent gallbladder.     Bile Ducts: No significant ductal dilatation.     Pancreas: No focal mass or ductal dilatation.     Spleen: Mildly enlarged measuring 13 cm.  No focal lesion.     Adrenals: Unremarkable.     Kidneys/Ureters: Few small bilateral cysts.  No solid enhancing renal mass.  No hydronephrosis.     Bladder: Circumferential wall thickening and surrounding fat stranding.     Prostate: Unremarkable.     GI Tract/Mesentery: No evidence of bowel obstruction or inflammation.  Colonic diverticulosis.     Peritoneal Space: Small volume abdominopelvic ascites.     Retroperitoneum: Several mildly enlarged re hepatic lymph nodes measuring up to 1.4 cm.     Abdominal wall:  Mild body wall edema.     Vasculature: Abdominal aorta is normal in caliber and contains atherosclerosis. Splenic vein and SMV are patent.  Upper abdominal collateral vessels.  Infrarenal IVC filter noted. Thin filling defect within the left superficial femoral and common femoral veins consistent with known partially occlusive DVT.     Bones: Diffusely low marrow signal concerning for a marrow replacement process.        Assessment:       1. Liver mass    2. Hepatitis C virus infection without hepatic coma, unspecified chronicity    3. Normocytic anemia    4. Thrombocytopenia    5. Hypertension, unspecified type           Plan:     Liver Mass - The patient has a large liver mass as well as an IVC mass extending into the right atrium  -PT to undergo liver biopsy 7/08/22  -Palliative care appt 7/01/22  -Pt to return to clonic a week after mass biopsy to go over results    Hepatitis C - Pt currently being managed by Dr David Bains in [facility] ([phone])  -PT not currently on treatment  -Will discuss with Dr Bains    Normocytic Anemia - WIll check iron studies and for hemolysis  -Likely due to chronic disease and liver disease    Thrombocytopenia - likely due to liver disease  -Will have peripheral smear viewed    HTN - pt on amlodipine  -Controlled   -Will monitor    Route Chart for Scheduling    Med Onc Chart Routing      Follow up with physician Other. The patient will need labs today with CBC, pathologist review, paltelet count, ferritin, iron/TIBC, haptoglobin, LDH, hepatitis C and retic.  He will need a return appt with me the week of 7/18/22.   Follow up with ARLYN    Infusion scheduling note    Injection scheduling note    Labs    Imaging    Pharmacy appointment    Other referrals               Srinivas Coronel MD  [facility]  Hematology and Oncology  [facility] [address]   [place] [id number]   O: [phone]  F: [phone]

## 2022-06-30 NOTE — TELEPHONE ENCOUNTER
----- Message from Autumn Boss RN sent at 6/30/2022  2:05 PM CDT -----  Please schedule f/u for this patient ,he was seen today and will need f/u in 3 months, thanks

## 2022-06-30 NOTE — TELEPHONE ENCOUNTER
Reached out to patient to schedule 3 month follow up visit. 3 month follow up due on Sept 30. Scheduled for 0900, LVM with call back info if appt does not work.

## 2022-07-12 NOTE — TELEPHONE ENCOUNTER
----- Message from Srinivas Coronel MD sent at 7/11/2022  2:49 PM CDT -----  The patient's path is back.  Please have him scheduled to see me in clinic this week to discuss.

## 2022-07-12 NOTE — TELEPHONE ENCOUNTER
Called and spoke with pt and his sister in regards to offering the patient an appt to be seen sooner than 7/19/2022 to discuss his bx results. Pt and sister voiced understanding. Pt is rescheduled to 7/14/2022.

## 2022-07-14 PROBLEM — C22.0 HCC (HEPATOCELLULAR CARCINOMA): Status: ACTIVE | Noted: 2022-01-01

## 2022-07-14 NOTE — PLAN OF CARE
START OFF PATHWAY REGIMEN - Other            Pembrolizumab (Keytruda)           Additional Orders: Serious immune-mediated adverse events can occur with   pembrolizumab. Please monitor your patient and refer to the linked   immune-mediated adverse reaction management materials for more information.    **Always confirm dose/schedule in your pharmacy ordering system**    Patient Characteristics:  Intent of Therapy:  Non-Curative / Palliative Intent, Discussed with Patient

## 2022-07-14 NOTE — PROGRESS NOTES
PATIENT: Irving Linn  MRN: [id number]  DATE: 7/14/2022      Diagnosis:   1. HCC (hepatocellular carcinoma)    2. Hepatitis C virus infection without hepatic coma, unspecified chronicity    3. Normocytic anemia    4. Thrombocytopenia    5. Hypertension, unspecified type        Chief Complaint: Follow-up (HCC)      Subjective:     Interval History: Mr. Linn is a 61 y.o. male with Cirrhosis, GERD, HTN, Hepatitis C who presents for follow up for HCC.  Since the last clinic visit the patient underwent liver biopsy 7/08/22 with path showing HCC.  The patient complains of occasional SOB, cough, and chest pain.  The patient denies abdominal pain, N/V, constipation, diarrhea.  The patient denies fever, chills, night sweats, weight loss, new lumps or bumps, easy bruising or bleeding.    Prior History: The patient initially presented to the ED on 6/16/22 with complaint of SOB.  US of the LE 6/16/22 showed a nonocclusive thrombus in the distal superficial femoral vein on the left.  CTA C/A/P on 6/16/22 showed a mass in the proximal IVC extending into the right atrium highly suspicious for malignancy and a rounded lesion in the inferior right lobe of the liver.  US of the abdomen on 6/17/22 showed a 4.8 x 4.6 for 5.2 cm heterogeneous lesion in the right hepatic lobe; echogenic lesion within the IVC measuring 4.9 x 3.3 x 4.2 cm.  MRI of the abdomen and pelvis 6/17/22 showed large heterogeneously enhancing mass within the IVC in standing and the right atrium measuring 7.6 x 5.9 x 4 cm with occlusion of the right hepatic vein; hypoenhancing mass within the inferior right hepatic lobe measuring 5.6 x 5.1 cm; several mildly enlarged re hepatic lymph nodes measuring 1.4 cm; and diffusely lobe marrow signal concerning for marrow replacement process in the bones.    Past Medical History:   Past Medical History:   Diagnosis Date    Cirrhosis     GERD (gastroesophageal reflux disease)     HTN (hypertension)        Past Surgical  HIstory:   Past Surgical History:   Procedure Laterality Date    MANDIBLE FRACTURE SURGERY         Family History:   Family History   Problem Relation Age of Onset    Lung cancer Mother     Cervical cancer Mother     Bladder Cancer Sister     Breast cancer Sister        Social History:  reports that he quit smoking about a year ago. His smoking use included cigarettes. He has a 20.00 pack-year smoking history. He has quit using smokeless tobacco.  His smokeless tobacco use included chew. He reports current drug use. He reports that he does not drink alcohol.    Allergies:  Review of patient's allergies indicates:  No Known Allergies    Medications:  Current Outpatient Medications   Medication Sig Dispense Refill    albuterol (PROVENTIL/VENTOLIN HFA) 90 mcg/actuation inhaler Inhale 2 puffs into the lungs every 4 (four) hours as needed for Shortness of Breath.      amLODIPine (NORVASC) 5 MG tablet Take 5 mg by mouth every morning.      gabapentin (NEURONTIN) 300 MG capsule Take 300 mg by mouth 2 (two) times daily.      pantoprazole (PROTONIX) 40 MG tablet Take 40 mg by mouth every morning.       No current facility-administered medications for this visit.       Review of Systems   Constitutional: Negative for chills, diaphoresis, fatigue, fever and unexpected weight change.   Respiratory: Positive for cough and shortness of breath.    Cardiovascular: Negative for chest pain and palpitations.   Gastrointestinal: Negative for abdominal pain, constipation, diarrhea, nausea and vomiting.   Musculoskeletal:        Chest pain   Skin: Negative for color change and rash.   Neurological: Negative for headaches.   Hematological: Negative for adenopathy. Does not bruise/bleed easily.       ECOG Performance Status: 1   Objective:      Vitals:   Vitals:    07/14/22 1006   BP: 102/70   BP Location: Left arm   Patient Position: Sitting   BP Method: Medium (Manual)   Pulse: 70   Temp: 97.6 °F (36.4 °C)   TempSrc: Temporal  "  SpO2: 96%   Weight: 74.5 kg (164 lb 3.9 oz)   Height: 5' 8" (1.727 m)       Physical Exam  Constitutional:       General: He is not in acute distress.     Appearance: He is well-developed. He is not diaphoretic.   HENT:      Head: Normocephalic and atraumatic.   Cardiovascular:      Rate and Rhythm: Normal rate and regular rhythm.      Heart sounds: Normal heart sounds. No murmur heard.    No friction rub. No gallop.   Pulmonary:      Effort: Pulmonary effort is normal. No respiratory distress.      Breath sounds: Normal breath sounds. No wheezing or rales.   Chest:      Chest wall: No tenderness.   Breasts:      Right: No axillary adenopathy or supraclavicular adenopathy.      Left: No axillary adenopathy or supraclavicular adenopathy.       Abdominal:      General: Bowel sounds are normal. There is no distension.      Palpations: Abdomen is soft. There is no mass.      Tenderness: There is no abdominal tenderness. There is no rebound.   Musculoskeletal:      Cervical back: Normal range of motion.   Lymphadenopathy:      Cervical: No cervical adenopathy.      Upper Body:      Right upper body: No supraclavicular or axillary adenopathy.      Left upper body: No supraclavicular or axillary adenopathy.   Skin:     General: Skin is warm and dry.      Findings: No erythema or rash.   Neurological:      Mental Status: He is alert and oriented to person, place, and time.   Psychiatric:         Behavior: Behavior normal.         Laboratory Data:  Hospital Outpatient Visit on 07/08/2022   Component Date Value Ref Range Status    PT 07/08/2022 16.8 (A) 11.8 - 14.7 sec Final    PT normal range is not established for pediatrics.    INR 07/08/2022 1.4   Final    aPTT 07/08/2022 38.5 (A) 24.6 - 36.7 sec Final    PTT normal range is not established for pediatrics.    WBC 07/08/2022 3.67 (A) 3.90 - 12.70 K/uL Final    RBC 07/08/2022 3.09 (A) 4.60 - 6.20 M/uL Final    Hemoglobin 07/08/2022 9.6 (A) 14.0 - 18.0 g/dL Final    " Hematocrit 07/08/2022 28.2 (A) 40.0 - 54.0 % Final    MCV 07/08/2022 91  82 - 98 fL Final    MCH 07/08/2022 31.1 (A) 27.0 - 31.0 pg Final    MCHC 07/08/2022 34.0  32.0 - 36.0 g/dL Final    RDW 07/08/2022 15.9 (A) 11.5 - 14.5 % Final    Platelets 07/08/2022 71 (A) 150 - 450 K/uL Final    MPV 07/08/2022 11.7  9.2 - 12.9 fL Final         Imaging: Reviewed    MRI of the abdomen and pelvis 6/17/22 showed  Image quality is degraded by motion artifact.     Inferior Thorax: Small bilateral pleural effusions.  Few bibasilar patchy opacities, better appreciated on prior CT.     Liver: Upper limit of normal size.  Nodular contour suggestive for cirrhosis.  Large heterogeneously enhancing diffusion restricting soft tissue mass centered within the IVC extending to the right atrium.  Lesion measures approximately 7.6 x 5.9 x 4.0 cm (series 24, image 32; series 22, image 22).  Mass extends into and occludes the right hepatic vein.  Hypoenhancing diffusion restricting mass within the inferior right hepatic lobe segment 5 measuring 5.6 x 5.1 cm (series 22, image 53).  Portal veins appear patent.     Gallbladder: Gallbladder is contracted and contains multiple stones.  Hepatic segment 5 mass closely abuts and may involve the adjacent gallbladder.     Bile Ducts: No significant ductal dilatation.     Pancreas: No focal mass or ductal dilatation.     Spleen: Mildly enlarged measuring 13 cm.  No focal lesion.     Adrenals: Unremarkable.     Kidneys/Ureters: Few small bilateral cysts.  No solid enhancing renal mass.  No hydronephrosis.     Bladder: Circumferential wall thickening and surrounding fat stranding.     Prostate: Unremarkable.     GI Tract/Mesentery: No evidence of bowel obstruction or inflammation.  Colonic diverticulosis.     Peritoneal Space: Small volume abdominopelvic ascites.     Retroperitoneum: Several mildly enlarged re hepatic lymph nodes measuring up to 1.4 cm.     Abdominal wall: Mild body wall edema.      Vasculature: Abdominal aorta is normal in caliber and contains atherosclerosis. Splenic vein and SMV are patent.  Upper abdominal collateral vessels.  Infrarenal IVC filter noted. Thin filling defect within the left superficial femoral and common femoral veins consistent with known partially occlusive DVT.     Bones: Diffusely low marrow signal concerning for a marrow replacement process.        Assessment:       1. HCC (hepatocellular carcinoma)    2. Hepatitis C virus infection without hepatic coma, unspecified chronicity    3. Normocytic anemia    4. Thrombocytopenia    5. Hypertension, unspecified type           Plan:     HCC - The patient has HCC with a large liver mass as well as an IVC mass extending into the right atrium  -Pt is Child Suresh B9 excluding the patient from treatment with Atezolizumab and Sue, Sorafenib, Lenvatinib  -Treatment with Pembrolizumab discussed  -Pt to undergo chemo teaching  -Pt to be set up for Chemo Care Companion  -Pt following with palliative care    Hepatitis C - Pt currently being managed by Dr David Bains in [place] ([phone])  -PT not currently on treatment  -Will monitor    Anemia of Chronic Disease - Iron studies from 6/28/22 concerning for anemia of chrocic disease  -Will montor    Thrombocytopenia - likely due to liver disease  -platelets 71 on 6/23/22  -Peripheral smear showed no significant findings    HTN - pt on amlodipine  -Controlled   -Will monitor    Advance Care Planning     Date: 07/14/2022    Power of   I initiated the process of advance care planning today and explained the importance of this process to the patient.  I introduced the concept of advance directives to the patient, as well. Then the patient received detailed information about the importance of designating a Health Care Power of  (HCPOA). He was also instructed to communicate with this person about their wishes for future healthcare, should he become sick and lose  decision-making capacity. The patient has previously appointed a HCPOA. After our discussion, the patient has decided to complete a HCPOA and has appointed health care agent: Shikha Dias & health care agent number: [phone] .              Route Chart for Scheduling    Med Onc Chart Routing      Follow up with physician Other. The patient needs chemo school and approval for pembrolizumab.  He needs to be set up for my chemo care companion at the [facility].  He needs a CBC, CMP, INR and TSH at [facility].  He will need an appt with me the day after the labs with treatment that day.   Follow up with ARLYN    Infusion scheduling note    Injection scheduling note    Labs    Imaging    Pharmacy appointment    Other referrals          Treatment Plan Information   OP PEMBROLIZUMAB 200MG Q3W   Srinivas Coronel MD   Upcoming Treatment Dates - OP PEMBROLIZUMAB 200MG Q3W    7/21/2022       Chemotherapy       pembrolizumab (KEYTRUDA) 200 mg in sodium chloride 0.9% 108 mL infusion  8/11/2022       Chemotherapy       pembrolizumab (KEYTRUDA) 200 mg in sodium chloride 0.9% 108 mL infusion  9/1/2022       Chemotherapy       pembrolizumab (KEYTRUDA) 200 mg in sodium chloride 0.9% 108 mL infusion  9/22/2022       Chemotherapy       pembrolizumab (KEYTRUDA) 200 mg in sodium chloride 0.9% 108 mL infusion      Srinivas Coronel MD  [facility]  Hematology and Oncology  [facility]   [address] [place] [address]   O: [phone]  F: [phone]

## 2022-07-15 NOTE — NURSING
Left message on VM in attempt to arrange Education for Keytruda. Awaiting return call to navigation office.

## 2022-07-20 NOTE — NURSING
Reached out to patient to schedule chemo school for Keytruda. Treatment plan has been authorized.   [facility] with call back number.

## 2022-07-21 NOTE — NURSING
Attempted to reach patient and patient's sister, paz, to set up chemo school and 1st keytruda treatment.  LVM with callback number.

## 2022-07-25 NOTE — NURSING
Reached out again to schedule keytruda chemo school, MD visit, and 1st keytruda treatment. No answer and LVM with callback number. Portal message sent at end of last week, but it has not been read yet.

## 2022-07-25 NOTE — TELEPHONE ENCOUNTER
Received a phone call back from patient's sister, Shikha. Scheduled chemo ed and labs on Thursday July 28th starting at 1300. Md frankt and 1st keytruda scheduled for Friday July 29th.   Date time and location confirmed.

## 2022-07-28 NOTE — PROGRESS NOTES
Oncology Nutrition   New Patient Education  Irving Linn   1960    Nutrition Education   This is a 61 y.o.male with a medical diagnosis of HCC.     Met w/ pt to discuss current nutritional status and nutrition as it relates to cancer and cancer treatment. Pt currently low nutrition risk. Pt states his appetite is pretty good. A few months ago, patient started losing weight unintentionally, which was one of the signs that got them into the doctor. Pt states he received oncology dx about three weeks ago now. Weight has been stable for the last 2 months.     Wt Readings from Last 10 Encounters:   07/28/22 75.4 kg (166 lb 1.9 oz)   07/14/22 74.5 kg (164 lb 3.9 oz)   07/08/22 72.6 kg (160 lb)   06/30/22 75 kg (165 lb 4.8 oz)   06/28/22 75.2 kg (165 lb 12.6 oz)   06/18/22 73.9 kg (163 lb)   06/16/22 75.1 kg (165 lb 9.1 oz)   06/17/22 75 kg (165 lb 5.5 oz)   11/21/15 89.6 kg (197 lb 8.5 oz)      [x] PMHx reviewed  [x] Labs reviewed    Educated on food safety and common nutrition impact symptoms associated with chemotherapy treatment. Reinforced the importance of good hydration. Handouts provided.    Answered all nutrition related questions.     Patient provided with dietitian contact number and advised to call with questions or make future appointment if further intervention is needed. RD to follow throughout tx PRN.    Brianne López, MS, RD, LDN  07/28/2022  3:53 PM

## 2022-07-28 NOTE — PROGRESS NOTES
Oncology   Chemotherapy School Education  Irving Linn   1960    Social Service Education   This is a 61 y.o.male with a medical diagnosis of HCC (hepatocellular carcinoma)    Current Support System: The pt was accompanied by his sister. The pt reports living on his other sister's property in his own home, but has no income.    The pt's sister reported that the family will be taking turns taking the pt to his appointments and infusion. The pt said he has a supportive family and pull together to do what they need to do for their family.    This LCSW explained how the TFP works and asked the pt if he was interested in applying for this program. The pt was agreeable to this plan and we filled out the form and provided an emergency food box to this pt.    Met with pt for new pt education. Reviewed role of  during cancer treatment. Educated on role of SW on care team and resources available at the cancer center.     Answered all psychosocial/socioeconomic related questions.     Patient provided with social contact number and advised to call with questions or make future appointment if further intervention is needed. SW to follow throughout tx.    Gayle Rollins LCSW  07/28/2022  3:03 PM

## 2022-07-28 NOTE — PROGRESS NOTES
Subjective:        Name: Irving Linn  : 1960  MRN: [id number]       Diagnosis:   1. HCC (hepatocellular carcinoma)    2. Hepatitis C virus infection without hepatic coma, unspecified chronicity    3. Normocytic anemia    4. Thrombocytopenia    5. Hypertension, unspecified type        CC:  Immunotherapy education    HPI:   Irving Linn is a 61 y.o. male who presented with Cirrhosis, GERD, HTN, Hepatitis C who presents for education for HCC with his sister.    Prior History:   The patient initially presented to the ED on 22 with complaint of SOB.    US of the LE 22 showed a nonocclusive thrombus in the distal superficial femoral vein on the left.    CTA C/A/P on 22 showed a mass in the proximal IVC extending into the right atrium highly suspicious for malignancy and a rounded lesion in the inferior right lobe of the liver.    US of the abdomen on 22 showed a 4.8 x 4.6 for 5.2 cm heterogeneous lesion in the right hepatic lobe; echogenic lesion within the IVC measuring 4.9 x 3.3 x 4.2 cm.    MRI of the abdomen and pelvis 22 showed large heterogeneously enhancing mass within the IVC in standing and the right atrium measuring 7.6 x 5.9 x 4 cm with occlusion of the right hepatic vein; hypoenhancing mass within the inferior right hepatic lobe measuring 5.6 x 5.1 cm; several mildly enlarged re hepatic lymph nodes measuring 1.4 cm; and diffusely lobe marrow signal concerning for marrow replacement process in the bones.    Oncology History   HCC (hepatocellular carcinoma)   2022 Initial Diagnosis    HCC (hepatocellular carcinoma)     2022 -  Chemotherapy    Treatment Summary   Plan Name: OP PEMBROLIZUMAB 200MG Q3W  Treatment Goal: Palliative  Status: Active  Start Date: 2022 (Planned)  End Date: 2024 (Planned)  Provider: Srinivas Coronel MD  Chemotherapy: pembrolizumab (KEYTRUDA) 200 mg in sodium chloride 0.9% 108 mL infusion, 200 mg, Intravenous, Clinic/HOD 1 time,  0 of 35 cycles        Past Medical History:   Diagnosis Date    Cirrhosis     GERD (gastroesophageal reflux disease)     HTN (hypertension)        Past Surgical History:   Procedure Laterality Date    MANDIBLE FRACTURE SURGERY         Family History   Problem Relation Age of Onset    Lung cancer Mother     Cervical cancer Mother     Bladder Cancer Sister     Breast cancer Sister        Social History     Socioeconomic History    Marital status: Single   Tobacco Use    Smoking status: Former Smoker     Packs/day: 1.00     Years: 20.00     Pack years: 20.00     Types: Cigarettes     Quit date: 2021     Years since quittin.0    Smokeless tobacco: Former User     Types: Chew   Substance and Sexual Activity    Alcohol use: No    Drug use: Yes       Review of patient's allergies indicates:  No Known Allergies    Review of Systems   Constitutional: Positive for decreased appetite and weight loss. Negative for fever.   Cardiovascular: Negative for chest pain.   Respiratory: Negative for cough.    All other systems reviewed and are negative.           Objective:     Vitals:    22 1258   Weight: 75.4 kg (166 lb 1.9 oz)        Physical Exam  Vitals reviewed.   Constitutional:       General: He is not in acute distress.  HENT:      Head: Normocephalic and atraumatic.   Pulmonary:      Effort: Pulmonary effort is normal.   Musculoskeletal:         General: Normal range of motion.   Neurological:      Mental Status: He is alert and oriented to person, place, and time.   Psychiatric:         Behavior: Behavior normal.         Thought Content: Thought content normal.             Current Outpatient Medications on File Prior to Visit   Medication Sig    albuterol (PROVENTIL/VENTOLIN HFA) 90 mcg/actuation inhaler Inhale 2 puffs into the lungs every 4 (four) hours as needed for Shortness of Breath.    amLODIPine (NORVASC) 5 MG tablet Take 5 mg by mouth every morning.    gabapentin (NEURONTIN) 300 MG  capsule Take 300 mg by mouth 2 (two) times daily.    pantoprazole (PROTONIX) 40 MG tablet Take 40 mg by mouth every morning.     No current facility-administered medications on file prior to visit.     US Biopsy Liver  Narrative: EXAMINATION:  Ultrasound-guided liver biopsy    CLINICAL HISTORY:  Masslike lesion liver.    Proceduralist:    Dr. Chris    SEDATION/ANESTHESIA:  Sedation was provided by dedicated sedation nurse under physician direction and supervision.    FINDINGS:  The risks, benefits, and alternatives of the procedure were discussed with the patient and written, informed consent was obtained.  The patient was placed in supine position on the procedure table and the right intercostal region was prepped and draped in the usual sterile fashion.  A time-out was performed to verify the patient and procedure.    An access site was identified with ultrasound and the overlying skin and subcutaneous tissues were anesthetized with 1% lidocaine.  A small dermatotomy was made with an 11 blade.  A 17 gauge trocar needle was advanced into liver mass under direct ultrasound guidance.  Six core biopsies were obtained with an 18 gauge core biopsy device the trocar stylet was replaced and the trocar was removed.    A sterile dressing was applied.  The patient tolerated the procedure well.  Postprocedural ultrasound demonstrated no immediate postprocedural complication.    Estimated blood loss: Minimal.    Condition patient: Stable.  Impression: Technically successful ultrasound-guided liver biopsy.    Electronically signed by: Kike Chris MD  Date:    07/12/2022  Time:    14:31       All pertinent labs and imaging reviewed.    Assessment:       1. HCC (hepatocellular carcinoma)         Plan:     HCC (hepatocellular carcinoma)  -     Ambulatory referral/consult to Chemo School  -     ONCOLOGY NAVIGATION REQUEST  -     Ambulatory referral/consult to Hematology/Oncology/Nutrition  -     Ambulatory referral/consult to  Oncology Social Work       1.  Treatment plan of Pembrolizumab (Keytruda) every 3 weeks discussed with patient & sister.  2.  Handouts provided from [employer] on immunotherapy agent.    3.  Discussed the mechanism of action, potential side effects of this treatment as well as ways to manage them at home.    4.  Dietary modifications discussed.  Detail instructions to be provided by dietician.   5.  Chemotherapy Portfolio supplied with contact information.   6.  Discussed follow-up with the physician for toxicity monitoring throughout treatment.    7.  Scripts were escribed (Zofran ODT, Imodium) and explained for home use.    8.  The patient and sister verbalized understanding. All questions were answered and an informed consent was obtained.   9.  Cycle 1 on 07/29/22; labs to be done post this visit; f/u with Dr. Coronel in am for clearance prior to tx.  10.  Care companion addressed per Dr. Coronel; Dr. Coronel addressed Living Will & reinforced.    Advance Care Planning     Date: 07/28/2022    Living Will  During this visit, I engaged the patient  in the advance care planning process.  The patient and I reviewed the role for advance directives and their purpose in directing future healthcare if the patient's unable to speak for him/herself.  At this point in time, the patient does have full decision-making capacity.  We discussed different extreme health states that he could experience, and reviewed what kind of medical care he would want in those situations.  The patient communicated that if he were comatose and had little chance of a meaningful recovery, he would not want machines/life-sustaining treatments used. In addition to the above preference. The patient has completed a living will to reflect these preferences.    I spent a total of 10 minutes engaging the patient in this advance care planning discussion.  Patient & sister stated that they handed forms in to be scanned to his account.

## 2022-07-29 NOTE — PROGRESS NOTES
Nutrition Note:     RD met patient yesterday during new patient education class. Today is patient's first day of treatment. Pt states he is doing well thus far and denies any follow up questions from appointment yesterday. RD will continue to monitor patient throughout active tx PRN.    Brianne López 07/29/2022 11:20 AM

## 2022-07-29 NOTE — PLAN OF CARE
Problem: Adult Inpatient Plan of Care  Goal: Patient-Specific Goal (Individualized)  Outcome: Ongoing, Progressing  Flowsheets (Taken 7/29/2022 1220)  Anxieties, Fears or Concerns: First treatment  Individualized Care Needs: Recliner, blanket  Patient-Specific Goals (Include Timeframe): Infection prevention during treatment.     Problem: Fatigue  Goal: Improved Activity Tolerance  Intervention: Promote Improved Energy  Flowsheets (Taken 7/29/2022 1220)  Fatigue Management:   activity schedule adjusted   frequent rest breaks encouraged   paced activity encouraged   fatigue-related activity identified  Sleep/Rest Enhancement:   regular sleep/rest pattern promoted   relaxation techniques promoted  Activity Management:   Ambulated -L4   Ambulated in mensah - L4     Problem: Adult Inpatient Plan of Care  Goal: Plan of Care Review  Outcome: Ongoing, Progressing  Flowsheets (Taken 7/29/2022 1220)  Plan of Care Reviewed With: patient  Tolerated treatment with no known distress.  Ambulated from infusion center with steady gait.

## 2022-08-03 NOTE — NURSING
Chart reviewed. C1 keytruda tolerated well. Due for C2 on 8/19. IO referral placed at this time. Will follow up at C2.     Oncology Navigation   Intake  Date of Diagnosis: 7/11/2022  Cancer Type: GI  Internal / External Referral: Internal  Referral Source: Yady Whitmore  Date of Referral: 6/23/2022  Initial Nurse Navigator Contact: 6/24/2022  Referral to Initial Contact Timeline (days): 1  Date Worked: 8/3/2022  First Appointment Available: 6/28/2022  Appointment Date: 6/28/2022  First Available Date vs. Scheduled Date (days): 0  Multiple appointments: No  Start of Treatment: 7/29/2022  Diagnosis to Treat Timeline (days): 18 days     Treatment  Current Status: Active  Date Presented to Tumor Board: 6/21/2022 (transplant conference)  Presentation Notes: Plan: R hepatic lobe mass with additional mass extending into the IVC and RA.  Enhancement not quite typical for HCC, but given AFP >8000, it almost certainly is HCC.  Could consider Y90 although liver function is marginal.  Can also consider systemic therapy and cardiology consult.  I have heard of people trying to mechanically remove some of these tumors that extend into the RA, but any sort of intervention like that is usually palliative only.       Medical Oncologist: Srinivas Coronel  Consult Date: 6/28/2022  Chemotherapy: N/A  Immunotherapy: Initiated  Immunotherapy Name: Keytruda  Start Date: 7/29/2022  Oral Therapy: N/A       Procedures: CT; MRI; Ultrasound; Other  CT Schedule Date: 6/16/2022  MRI Schedule Date: 6/17/2022  Ultrasound Schedule Date: 6/17/2022  Other Schedule Date: 6/16/2022 (CTA)    General Referrals: Integrative medicine          Support Systems: Family members     Acuity      Follow Up  Follow up in about 16 days (around 8/19/2022) for C2 keytruda treatment .

## 2022-08-18 NOTE — TELEPHONE ENCOUNTER
"Received phone call from patient's sister, Shikha Dias.    States she was letting Dr. Coronel know patient was having pain but that pt not taking his med as ordered.    "He was taking a pain pill about every other day.  I am attempting to work with him and have him understand that the doctor ordered the medicine for a reason.  He is also taking a stool softener every day."    Emotional support given        "

## 2022-08-18 NOTE — TELEPHONE ENCOUNTER
Message noted on [facility] that pt is having pain.    Call placed to pt's home.  Person who answered the phone was currently on ZOOM call & would return to this RN--awaiting call

## 2022-08-19 NOTE — TELEPHONE ENCOUNTER
LM for pt to let him know [facility] does not handle neck,back or spine & may want to refer to phone# on back of Medicaid card/jf 8/19/22

## 2022-08-19 NOTE — PROGRESS NOTES
PATIENT: Irving Linn  MRN: [id number]  DATE: 8/19/2022      Diagnosis:   1. HCC (hepatocellular carcinoma)    2. Hepatitis C virus infection without hepatic coma, unspecified chronicity    3. Normocytic anemia    4. Thrombocytopenia    5. Hypertension, unspecified type    6. Cancer related pain    7. Chest pain, unspecified type    8. Bleeding from the nose    9. Chronic left-sided low back pain without sciatica        Chief Complaint: Hepatic Cancer (3 week follow up )      Subjective:     Interval History: Mr. Linn is a 62 y.o. male with Cirrhosis, GERD, HTN, Hepatitis C who presents for follow up for HCC.  Since the last clinic visit the patient states he underwent a stress test last week at [facility] under the direction of Dr Gomez.  Per conversation with Dr Gomez, the stress test was negative.  He states he has been on Aspirin for the past 2 weeks but stopped 3 days ago due to a nose bleed.  The patient complains of continued SOB and occasional chest pain.  He endorses lower left back pain.  The patient denies cough, abdominal pain, N/V, constipation, diarrhea.  The patient denies fever, chills, night sweats, weight loss, new lumps or bumps, easy bruising or bleeding.    Prior History: The patient initially presented to the ED on 6/16/22 with complaint of SOB.  US of the LE 6/16/22 showed a nonocclusive thrombus in the distal superficial femoral vein on the left.  CTA C/A/P on 6/16/22 showed a mass in the proximal IVC extending into the right atrium highly suspicious for malignancy and a rounded lesion in the inferior right lobe of the liver.  US of the abdomen on 6/17/22 showed a 4.8 x 4.6 for 5.2 cm heterogeneous lesion in the right hepatic lobe; echogenic lesion within the IVC measuring 4.9 x 3.3 x 4.2 cm.  MRI of the abdomen and pelvis 6/17/22 showed large heterogeneously enhancing mass within the IVC in standing and the right atrium measuring 7.6 x 5.9 x 4 cm with occlusion of the  right hepatic vein; hypoenhancing mass within the inferior right hepatic lobe measuring 5.6 x 5.1 cm; several mildly enlarged re hepatic lymph nodes measuring 1.4 cm; and diffusely lobe marrow signal concerning for marrow replacement process in the bones.   The patient underwent liver biopsy 7/08/22 with path showing HCC    Past Medical History:   Past Medical History:   Diagnosis Date    Cirrhosis     GERD (gastroesophageal reflux disease)     HTN (hypertension)        Past Surgical HIstory:   Past Surgical History:   Procedure Laterality Date    MANDIBLE FRACTURE SURGERY         Family History:   Family History   Problem Relation Age of Onset    Lung cancer Mother     Cervical cancer Mother     Bladder Cancer Sister     Breast cancer Sister        Social History:  reports that he quit smoking about 13 months ago. His smoking use included cigarettes. He has a 20.00 pack-year smoking history. He has quit using smokeless tobacco.  His smokeless tobacco use included chew. He reports current drug use. He reports that he does not drink alcohol.    Allergies:  Review of patient's allergies indicates:  No Known Allergies    Medications:  Current Outpatient Medications   Medication Sig Dispense Refill    albuterol (PROVENTIL/VENTOLIN HFA) 90 mcg/actuation inhaler Inhale 2 puffs into the lungs every 4 (four) hours as needed for Shortness of Breath.      amLODIPine (NORVASC) 5 MG tablet Take 5 mg by mouth every morning.      loperamide (IMODIUM) 2 mg capsule Take 1 capsule (2 mg total) by mouth 4 (four) times daily as needed for Diarrhea. 30 capsule 2    ondansetron (ZOFRAN-ODT) 8 MG TbDL Take 1 tablet (8 mg total) by mouth every 8 (eight) hours as needed (nausea). 40 tablet 3    oxyCODONE (ROXICODONE) 5 MG immediate release tablet Take 1 tablet (5 mg total) by mouth every 4 (four) hours as needed (Pain). 60 tablet 0    traZODone (DESYREL) 50 MG tablet Take 50 mg by mouth nightly as needed for Insomnia.    "    No current facility-administered medications for this visit.     Facility-Administered Medications Ordered in Other Visits   Medication Dose Route Frequency Provider Last Rate Last Admin    diphenhydrAMINE injection 50 mg  50 mg Intravenous Once PRN Srinivas Coronel MD        EPINEPHrine (EPIPEN) 0.3 mg/0.3 mL pen injection 0.3 mg  0.3 mg Intramuscular Once PRN Srinivas Coronel MD        hydrocortisone sodium succinate injection 100 mg  100 mg Intravenous Once PRN Srinivas Coronel MD        pembrolizumab (KEYTRUDA) 200 mg in sodium chloride 0.9% 108 mL infusion  200 mg Intravenous 1 time in Clinic/HOD Srinivas Coronel MD        sodium chloride 0.9% 100 mL flush bag   Intravenous 1 time in Clinic/HOD Srinivas Coronel MD        sodium chloride 0.9% flush 10 mL  10 mL Intravenous PRN Srinivas Coronel MD           Review of Systems   Constitutional: Negative for appetite change, chills, diaphoresis, fatigue, fever and unexpected weight change.   HENT: Positive for nosebleeds. Negative for mouth sores.    Eyes: Negative for visual disturbance.   Respiratory: Positive for shortness of breath. Negative for cough.    Cardiovascular: Negative for chest pain and palpitations.   Gastrointestinal: Negative for abdominal pain, constipation, diarrhea, nausea and vomiting.   Genitourinary: Negative for frequency.   Musculoskeletal: Positive for back pain.        Chest pain  Left hip pain   Skin: Negative for color change and rash.   Neurological: Negative for headaches.   Hematological: Negative for adenopathy. Does not bruise/bleed easily.   Psychiatric/Behavioral: The patient is not nervous/anxious.        ECOG Performance Status: 1   Objective:      Vitals:   Vitals:    08/19/22 0842   BP: 116/70   BP Location: Left arm   Patient Position: Sitting   BP Method: Medium (Manual)   Pulse: 68   Temp: 97.2 °F (36.2 °C)   TempSrc: Temporal   SpO2: 100%   Weight: 74.2 kg (163 lb 9.3 oz)   Height: 5' 8" (1.727 m) "       Physical Exam  Constitutional:       General: He is not in acute distress.     Appearance: He is well-developed. He is not diaphoretic.   HENT:      Head: Normocephalic and atraumatic.   Cardiovascular:      Rate and Rhythm: Normal rate and regular rhythm.      Heart sounds: Normal heart sounds. No murmur heard.    No friction rub. No gallop.   Pulmonary:      Effort: Pulmonary effort is normal. No respiratory distress.      Breath sounds: Rhonchi (at bases) present. No wheezing or rales.   Chest:      Chest wall: No tenderness.   Breasts:      Right: No axillary adenopathy or supraclavicular adenopathy.      Left: No axillary adenopathy or supraclavicular adenopathy.       Abdominal:      General: Bowel sounds are normal. There is no distension.      Palpations: Abdomen is soft. There is no mass.      Tenderness: There is no abdominal tenderness. There is no rebound.   Musculoskeletal:      Cervical back: Normal range of motion.   Lymphadenopathy:      Cervical: No cervical adenopathy.      Upper Body:      Right upper body: No supraclavicular or axillary adenopathy.      Left upper body: No supraclavicular or axillary adenopathy.   Skin:     General: Skin is warm and dry.      Findings: No erythema or rash.   Neurological:      Mental Status: He is alert and oriented to person, place, and time.   Psychiatric:         Behavior: Behavior normal.         Laboratory Data:  Lab Visit on 08/18/2022   Component Date Value Ref Range Status    WBC 08/18/2022 4.30  3.90 - 12.70 K/uL Final    RBC 08/18/2022 2.86 (A) 4.60 - 6.20 M/uL Final    Hemoglobin 08/18/2022 9.6 (A) 14.0 - 18.0 g/dL Final    Hematocrit 08/18/2022 28.0 (A) 40.0 - 54.0 % Final    MCV 08/18/2022 98  82 - 98 fL Final    MCH 08/18/2022 33.6 (A) 27.0 - 31.0 pg Final    MCHC 08/18/2022 34.3  32.0 - 36.0 g/dL Final    RDW 08/18/2022 14.3  11.5 - 14.5 % Final    Platelets 08/18/2022 113 (A) 150 - 450 K/uL Final    MPV 08/18/2022 12.3  9.2 - 12.9  fL Final    Immature Granulocytes 08/18/2022 0.2  0.0 - 0.5 % Final    Gran # (ANC) 08/18/2022 1.8  1.8 - 7.7 K/uL Final    Immature Grans (Abs) 08/18/2022 0.01  0.00 - 0.04 K/uL Final    Comment: Mild elevation in immature granulocytes is non specific and   can be seen in a variety of conditions including stress response,   acute inflammation, trauma and pregnancy. Correlation with other   laboratory and clinical findings is essential.      Lymph # 08/18/2022 1.7  1.0 - 4.8 K/uL Final    Mono # 08/18/2022 0.5  0.3 - 1.0 K/uL Final    Eos # 08/18/2022 0.2  0.0 - 0.5 K/uL Final    Baso # 08/18/2022 0.07  0.00 - 0.20 K/uL Final    nRBC 08/18/2022 0  0 /100 WBC Final    Gran % 08/18/2022 41.6  38.0 - 73.0 % Final    Lymph % 08/18/2022 39.3  18.0 - 48.0 % Final    Mono % 08/18/2022 12.6  4.0 - 15.0 % Final    Eosinophil % 08/18/2022 4.7  0.0 - 8.0 % Final    Basophil % 08/18/2022 1.6  0.0 - 1.9 % Final    Differential Method 08/18/2022 Automated   Final    Sodium 08/18/2022 137  136 - 145 mmol/L Final    Potassium 08/18/2022 3.8  3.5 - 5.1 mmol/L Final    Chloride 08/18/2022 107  95 - 110 mmol/L Final    CO2 08/18/2022 24  23 - 29 mmol/L Final    Glucose 08/18/2022 81  70 - 110 mg/dL Final    BUN 08/18/2022 15  8 - 23 mg/dL Final    Creatinine 08/18/2022 0.8  0.5 - 1.4 mg/dL Final    Calcium 08/18/2022 9.2  8.7 - 10.5 mg/dL Final    Total Protein 08/18/2022 9.0 (A) 6.0 - 8.4 g/dL Final    Albumin 08/18/2022 3.1 (A) 3.5 - 5.2 g/dL Final    Total Bilirubin 08/18/2022 1.2 (A) 0.1 - 1.0 mg/dL Final    Comment: For infants and newborns, interpretation of results should be based  on gestational age, weight and in agreement with clinical  observations.    Premature Infant recommended reference ranges:  Up to 24 hours.............<8.0 mg/dL  Up to 48 hours............<12.0 mg/dL  3-5 days..................<15.0 mg/dL  6-29 days.................<15.0 mg/dL      Alkaline Phosphatase 08/18/2022 120  55 -  135 U/L Final    AST 08/18/2022 33  10 - 40 U/L Final    ALT 08/18/2022 14  10 - 44 U/L Final    Anion Gap 08/18/2022 6 (A) 8 - 16 mmol/L Final    eGFR 08/18/2022 >60.0  >60 mL/min/1.73 m^2 Final    TSH 08/18/2022 1.024  0.400 - 4.000 uIU/mL Final    Prothrombin Time 08/18/2022 11.4  9.0 - 12.5 sec Final    INR 08/18/2022 1.1  0.8 - 1.2 Final    Comment: Coumadin Therapy:  2.0 - 3.0 for INR for all indicators except mechanical heart valves  and antiphospholipid syndromes which should use 2.5 - 3.5.           Imaging: Reviewed    MRI of the abdomen and pelvis 6/17/22 showed  Image quality is degraded by motion artifact.     Inferior Thorax: Small bilateral pleural effusions.  Few bibasilar patchy opacities, better appreciated on prior CT.     Liver: Upper limit of normal size.  Nodular contour suggestive for cirrhosis.  Large heterogeneously enhancing diffusion restricting soft tissue mass centered within the IVC extending to the right atrium.  Lesion measures approximately 7.6 x 5.9 x 4.0 cm (series 24, image 32; series 22, image 22).  Mass extends into and occludes the right hepatic vein.  Hypoenhancing diffusion restricting mass within the inferior right hepatic lobe segment 5 measuring 5.6 x 5.1 cm (series 22, image 53).  Portal veins appear patent.     Gallbladder: Gallbladder is contracted and contains multiple stones.  Hepatic segment 5 mass closely abuts and may involve the adjacent gallbladder.     Bile Ducts: No significant ductal dilatation.     Pancreas: No focal mass or ductal dilatation.     Spleen: Mildly enlarged measuring 13 cm.  No focal lesion.     Adrenals: Unremarkable.     Kidneys/Ureters: Few small bilateral cysts.  No solid enhancing renal mass.  No hydronephrosis.     Bladder: Circumferential wall thickening and surrounding fat stranding.     Prostate: Unremarkable.     GI Tract/Mesentery: No evidence of bowel obstruction or inflammation.  Colonic diverticulosis.     Peritoneal Space:  Small volume abdominopelvic ascites.     Retroperitoneum: Several mildly enlarged re hepatic lymph nodes measuring up to 1.4 cm.     Abdominal wall: Mild body wall edema.     Vasculature: Abdominal aorta is normal in caliber and contains atherosclerosis. Splenic vein and SMV are patent.  Upper abdominal collateral vessels.  Infrarenal IVC filter noted. Thin filling defect within the left superficial femoral and common femoral veins consistent with known partially occlusive DVT.     Bones: Diffusely low marrow signal concerning for a marrow replacement process.        Assessment:       1. HCC (hepatocellular carcinoma)    2. Hepatitis C virus infection without hepatic coma, unspecified chronicity    3. Normocytic anemia    4. Thrombocytopenia    5. Hypertension, unspecified type    6. Cancer related pain    7. Chest pain, unspecified type    8. Bleeding from the nose    9. Chronic left-sided low back pain without sciatica           Plan:     HCC - The patient has HCC with a large liver mass as well as an IVC mass extending into the right atrium  -Pt following with palliative care  -Will proceed with C2D1 of pembrolizumab  -Will consider rescanning after his third treatment    Hepatitis C - Pt currently being managed by Dr David Bains in [facility] ([phone])  -PT not currently on treatment  -Will monitor    Anemia of Chronic Disease - Iron studies from 6/28/22 concerning for anemia of chrocic disease  -Hemoglobin is 9.6g/dL on 8/18/22  -Will montor    Thrombocytopenia - likely due to liver disease  -platelets are 113k on 8/18/22  -Peripheral smear showed no significant findings    HTN - pt on amlodipine  -Controlled   -Will monitor    Cancer Related Pain - pt on oxycodone 5mg every 4 hours  -Will monitor    Chest Pain - Pt underwent a stress test last week  -I spoke with Dr Gomez the patient's cardiologist whom stated the stress test was negative  -The patient was instructed that he could stop his  aspirin    Nose Bleed - self limited  -Pt stopped ASA  -Pt can continue holding ASA    Back Pain - pt with chronic lower back pain proceeding his cancer diagnosis  -Pt to see orthopedics    Advance Care Planning     Date: 07/14/2022    Power of   I initiated the process of advance care planning today and explained the importance of this process to the patient.  I introduced the concept of advance directives to the patient, as well. Then the patient received detailed information about the importance of designating a Health Care Power of  (HCPOA). He was also instructed to communicate with this person about their wishes for future healthcare, should he become sick and lose decision-making capacity. The patient has previously appointed a HCPOA. After our discussion, the patient has decided to complete a HCPOA and has appointed health care agent: Shikha Dias & health care agent number: [phone] .              Route Chart for Scheduling    Med Onc Chart Routing      Follow up with physician 3 weeks. The patient can proceed with treatment.  He needs a CBC, CMP, TSH and INR on 9/08/22 with an appt with me and treatment on 9/09/22.  The patient needs an appt with orthopedics for lower back pain..   Follow up with ARLYN    Infusion scheduling note    Injection scheduling note    Labs    Imaging    Pharmacy appointment    Other referrals          Treatment Plan Information   OP PEMBROLIZUMAB 200MG Q3W   Srinivas Coronel MD   Upcoming Treatment Dates - OP PEMBROLIZUMAB 200MG Q3W    9/9/2022       Chemotherapy       pembrolizumab (KEYTRUDA) 200 mg in sodium chloride 0.9% 108 mL infusion  9/30/2022       Chemotherapy       pembrolizumab (KEYTRUDA) 200 mg in sodium chloride 0.9% 108 mL infusion  10/21/2022       Chemotherapy       pembrolizumab (KEYTRUDA) 200 mg in sodium chloride 0.9% 108 mL infusion  11/11/2022       Chemotherapy       pembrolizumab (KEYTRUDA) 200 mg in sodium chloride 0.9% 108 mL  infusion      Srinivas Coronel MD  [facility]  Hematology and Oncology  [facility]   [address] [place] [address]   O: [phone]  F: [phone]

## 2022-08-25 NOTE — NURSING
Chart reviewed; pt tolerated c2 of keytruda. Will continue to follow for any future navigation needs.     Oncology Navigation   Intake  Date of Diagnosis: 7/11/2022  Cancer Type: GI  Internal / External Referral: Internal  Referral Source: Yady Whitmore  Date of Referral: 6/23/2022  Initial Nurse Navigator Contact: 6/24/2022  Referral to Initial Contact Timeline (days): 1  Date Worked: 8/25/2022  First Appointment Available: 6/28/2022  Appointment Date: 6/28/2022  First Available Date vs. Scheduled Date (days): 0  Multiple appointments: No  Start of Treatment: 7/29/2022  Diagnosis to Treat Timeline (days): 18 days     Treatment  Current Status: Active  Date Presented to Tumor Board: 6/21/2022 (transplant conference)  Presentation Notes: Plan: R hepatic lobe mass with additional mass extending into the IVC and RA.  Enhancement not quite typical for HCC, but given AFP >8000, it almost certainly is HCC.  Could consider Y90 although liver function is marginal.  Can also consider systemic therapy and cardiology consult.  I have heard of people trying to mechanically remove some of these tumors that extend into the RA, but any sort of intervention like that is usually palliative only.       Medical Oncologist: Srinivas Coronel  Consult Date: 6/28/2022  Chemotherapy: N/A  Immunotherapy: Initiated  Immunotherapy Name: Keytruda  Start Date: 7/29/2022  Oral Therapy: N/A       Procedures: CT; MRI; Ultrasound; Other  CT Schedule Date: 6/16/2022  MRI Schedule Date: 6/17/2022  Ultrasound Schedule Date: 6/17/2022  Other Schedule Date: 6/16/2022 (CTA)    General Referrals: Integrative medicine          Support Systems: Family members     Acuity      Follow Up  Follow up in about 22 days (around 9/16/2022) for possible rescanning after C3 per Dr. Coronel.

## 2022-09-09 NOTE — PROGRESS NOTES
ONCOLOGY NUTRITION   FOLLOW UP VISIT        Irving Linn is a 62 y.o. male.  DATE: 09/09/2022        Oncology Diagnosis: HCC     REFERRAL FROM:   [] Integrative Oncology   [] Med/Heme Oncology  [] Radiation Oncology  [] Surgical Oncology   [] Infusion Nurse    [x] Routine Nutrition follow up    TREATMENT PLAN:   [] Full treatment plan pending  [x] Chemotherapy  [] Immunotherapy  [] Radiation  [] Concurrent  [] Surgery  [] Treatment complete/post-treatment    ANTHROPOMETRICS:  Wt Readings from Last 10 Encounters:   09/09/22 70.2 kg (154 lb 12.2 oz)   09/09/22 70.2 kg (154 lb 12.2 oz)   08/19/22 74.9 kg (165 lb 2 oz)   08/19/22 74.2 kg (163 lb 9.3 oz)   07/29/22 75.6 kg (166 lb 10.7 oz)   07/29/22 75.6 kg (166 lb 10.7 oz)   07/28/22 75.4 kg (166 lb 1.9 oz)   07/14/22 74.5 kg (164 lb 3.9 oz)   07/08/22 72.6 kg (160 lb)   06/30/22 75 kg (165 lb 4.8 oz)      Weight Changes: has decreased 11 pounds over last 3 weeks    PHYSICAL EXAM:  Muscle Wasting Observed:  [] No Deficit   [] Mild Deficit   [x] Moderate   [] Severe    INTAKE:  [x] PO Intake [] TF Intake  Current Diet: regular diet  Dietary Patterns:  Eating meals/snacks as tolerated  [x] Oral nutritional supplements: Ensure as able    SYMPTOMS/COMPLAINTS:  [] No nutritional concerns at current  [] Diarrhea                    [] Constipation           [] Nausea                 [] Vomiting                [] Indigestion                [] Reflux              [] Poor Appetite            [] Anorexia                 [] Early Satiety         [] Gas                       [] Bloating                     [] Dry Mouth    [] Mucositis                   [] Mouth Sores           [] Poor Dentition      [] Difficulty chewing  [] Difficulty Swallowing   [] Pain with swallowing [] Change in taste      [] Change in smell   [x] Pain (general)       [] Fatigue                      [] Sleep issues    [] Weight loss  [] other, please specify    Nutrition Re-Assessment Risk:  Moderate    [x] Labs reviewed   [x] Meds reviewed    Education Provided:  [] No Education Needed at this time  [] Diarrhea                                              [] Constipation                          [] Nausea/Vomiting  [] Mucositis                [] Dry Mouth    [] Dealing with changes in Taste/Smell  [] Dealing with Poor Appetite   [] Soft/moist Diet      [] Weight Loss/Gain     [x] Weight Maintenance                           [] Indigestion/GERD                 [] Gas/Bloating          [] Foods High/ Low in specific nutrients [x] Increasing Calories/Protein   [] Milkshake/Smoothies Recipes   [] Nutrition Supplements                        [] Increasing Fluid Intakes         [] Foods that fight cancer    [] Evidence bases resources                 [] Fermented Foods/Probiotics  [] Mediterranean/Plant Based Diet     [] Other, specify                                   [x] Handouts provided: Ensure Coupons      [] Samples provided     RD NOTE:  RD met with patient at chairside during infusion treatment. Pt states nutritionally he has been doing pretty well but he has really been struggling with some pain on his left side. Pt did discuss with Dr. Coronel today. RD discussed concerns with significant weight loss with patient. Pt states he was doing Ensure periodically but knows he should do it more.     D/t patient having Medicaid, there is a chance ONS would be covered via insurance. Discussed with patient's sister, Shikha, who helps care for patient who agreed for RD to send nutrition prescription to pharmacy. Discussed with Dr. Coronel staff and faxed form over. In the meantime, Shikha states she will buy him some to hold him over because she knows concerns with inadequate caloric/protein intake.     Pt eligible for TFP order- filled and provided to patient at the end of his infusion today.    RD Goals:   [x] Weight stable                  [] Weight gain                      [] Weight Loss                                [] Continue adequate Kcal/protein   [x] Increase Kcal/protein      [] Adjust Tube-feeding Rx   [] Tolerate Tube Feedings             [] Increase tube feedings to goal     [] Tolerate Supplements     [] Symptom Improvement   [x] Understand nutrition Education  [] Offer supportive visits   [] other, please specify    RECOMMENDATIONS:  ONS BID - sent prescription for Ensure Original d/t Enlive being on backorder  Consume adequate calorie/protein intake to maintain weight.   Consume adequate fluid intake to maintain proper hydration    Follow up: Next infusion or PRN throughout tx    Brianne López, MS, RD, LDN  09/09/2022  3:18 PM

## 2022-09-09 NOTE — PROGRESS NOTES
PATIENT: Irving Linn  MRN: [id number]  DATE: 9/9/2022      Diagnosis:   1. HCC (hepatocellular carcinoma)    2. Mild intermittent reactive airway disease without complication    3. Hepatitis C virus infection without hepatic coma, unspecified chronicity    4. Normocytic anemia    5. Thrombocytopenia    6. Hypertension, unspecified type    7. Cancer related pain    8. Chronic left-sided low back pain without sciatica        Chief Complaint: Follow-up (HCC)      Subjective:     Interval History: Mr. Linn is a 62 y.o. male with Cirrhosis, GERD, HTN, Hepatitis C who presents for follow up for HCC.  Since the last clinic visit the patient states he continues to have left chest pain with minimal relief from oxycodone 5mg.  The patient denies cough, SOB, abdominal pain, N/V, constipation, diarrhea.  The patient denies fever, chills, night sweats, weight loss, new lumps or bumps, easy bruising or bleeding.    Prior History: The patient initially presented to the ED on 6/16/22 with complaint of SOB.  US of the LE 6/16/22 showed a nonocclusive thrombus in the distal superficial femoral vein on the left.  CTA C/A/P on 6/16/22 showed a mass in the proximal IVC extending into the right atrium highly suspicious for malignancy and a rounded lesion in the inferior right lobe of the liver.  US of the abdomen on 6/17/22 showed a 4.8 x 4.6 for 5.2 cm heterogeneous lesion in the right hepatic lobe; echogenic lesion within the IVC measuring 4.9 x 3.3 x 4.2 cm.  MRI of the abdomen and pelvis 6/17/22 showed large heterogeneously enhancing mass within the IVC in standing and the right atrium measuring 7.6 x 5.9 x 4 cm with occlusion of the right hepatic vein; hypoenhancing mass within the inferior right hepatic lobe measuring 5.6 x 5.1 cm; several mildly enlarged re hepatic lymph nodes measuring 1.4 cm; and diffusely lobe marrow signal concerning for marrow replacement process in the bones.   The patient underwent liver biopsy  7/08/22 with path showing HCC.    Past Medical History:   Past Medical History:   Diagnosis Date    Cirrhosis     GERD (gastroesophageal reflux disease)     HTN (hypertension)        Past Surgical HIstory:   Past Surgical History:   Procedure Laterality Date    MANDIBLE FRACTURE SURGERY         Family History:   Family History   Problem Relation Age of Onset    Lung cancer Mother     Cervical cancer Mother     Bladder Cancer Sister     Breast cancer Sister        Social History:  reports that he quit smoking about 14 months ago. His smoking use included cigarettes. He has a 20.00 pack-year smoking history. He has quit using smokeless tobacco.  His smokeless tobacco use included chew. He reports current drug use. He reports that he does not drink alcohol.    Allergies:  Review of patient's allergies indicates:  No Known Allergies    Medications:  Current Outpatient Medications   Medication Sig Dispense Refill    amLODIPine (NORVASC) 5 MG tablet Take 5 mg by mouth every morning.      loperamide (IMODIUM) 2 mg capsule Take 1 capsule (2 mg total) by mouth 4 (four) times daily as needed for Diarrhea. 30 capsule 2    ondansetron (ZOFRAN-ODT) 8 MG TbDL Take 1 tablet (8 mg total) by mouth every 8 (eight) hours as needed (nausea). 40 tablet 3    traZODone (DESYREL) 50 MG tablet Take 50 mg by mouth nightly as needed for Insomnia.      albuterol (PROVENTIL/VENTOLIN HFA) 90 mcg/actuation inhaler Inhale 2 puffs into the lungs every 4 (four) hours as needed for Shortness of Breath. 18 g 6    oxyCODONE (ROXICODONE) 10 mg Tab immediate release tablet Take 1 tablet (10 mg total) by mouth every 4 (four) hours as needed for Pain. 60 tablet 0     No current facility-administered medications for this visit.     Facility-Administered Medications Ordered in Other Visits   Medication Dose Route Frequency Provider Last Rate Last Admin    alteplase injection 2 mg  2 mg Intra-Catheter PRN Srinivas Coronel MD        diphenhydrAMINE injection  "50 mg  50 mg Intravenous Once PRN Srinivas Coronel MD        EPINEPHrine (EPIPEN) 0.3 mg/0.3 mL pen injection 0.3 mg  0.3 mg Intramuscular Once PRN Srinivas Coronel MD        heparin, porcine (PF) 100 unit/mL injection flush 500 Units  500 Units Intravenous PRN Srinivas Coronel MD        hydrocortisone sodium succinate injection 100 mg  100 mg Intravenous Once PRN Srinivas Coronel MD        pembrolizumab (KEYTRUDA) 200 mg in sodium chloride 0.9% 108 mL infusion  200 mg Intravenous 1 time in Clinic/HOD Srinivas Coronel  mL/hr at 09/09/22 1201 200 mg at 09/09/22 1201    sodium chloride 0.9% flush 10 mL  10 mL Intravenous PRN Srinivas Coronel MD           Review of Systems   Constitutional:  Negative for appetite change, chills, diaphoresis, fatigue, fever and unexpected weight change.   HENT:  Negative for mouth sores and nosebleeds.    Eyes:  Negative for visual disturbance.   Respiratory:  Negative for cough and shortness of breath.    Cardiovascular:  Negative for chest pain and palpitations.   Gastrointestinal:  Negative for abdominal pain, constipation, diarrhea, nausea and vomiting.   Genitourinary:  Negative for frequency.   Musculoskeletal:  Positive for back pain.        Chest pain  Left hip pain   Skin:  Negative for color change and rash.   Neurological:  Negative for headaches.   Hematological:  Negative for adenopathy. Does not bruise/bleed easily.   Psychiatric/Behavioral:  The patient is not nervous/anxious.      ECOG Performance Status: 1   Objective:      Vitals:   Vitals:    09/09/22 1045   BP: 115/70   BP Location: Left arm   Patient Position: Sitting   BP Method: Medium (Automatic)   Pulse: (!) 57   Resp: 18   Temp: 96.6 °F (35.9 °C)   TempSrc: Temporal   SpO2: 97%   Weight: 70.2 kg (154 lb 12.2 oz)   Height: 5' 8" (1.727 m)       Physical Exam  Constitutional:       General: He is not in acute distress.     Appearance: He is well-developed. He is not diaphoretic.   HENT:      Head: " Normocephalic and atraumatic.   Cardiovascular:      Rate and Rhythm: Normal rate and regular rhythm.      Heart sounds: Normal heart sounds. No murmur heard.    No friction rub. No gallop.   Pulmonary:      Effort: Pulmonary effort is normal. No respiratory distress.      Breath sounds: No wheezing, rhonchi or rales.   Chest:      Chest wall: No tenderness.   Abdominal:      General: Bowel sounds are normal. There is no distension.      Palpations: Abdomen is soft. There is no mass.      Tenderness: There is no abdominal tenderness. There is no rebound.   Musculoskeletal:      Cervical back: Normal range of motion.   Lymphadenopathy:      Cervical: No cervical adenopathy.      Upper Body:      Right upper body: No supraclavicular or axillary adenopathy.      Left upper body: No supraclavicular or axillary adenopathy.   Skin:     General: Skin is warm and dry.      Findings: No erythema or rash.   Neurological:      Mental Status: He is alert and oriented to person, place, and time.   Psychiatric:         Behavior: Behavior normal.       Laboratory Data:  Lab Visit on 09/08/2022   Component Date Value Ref Range Status    WBC 09/08/2022 5.45  3.90 - 12.70 K/uL Final    RBC 09/08/2022 2.87 (L)  4.60 - 6.20 M/uL Final    Hemoglobin 09/08/2022 9.6 (L)  14.0 - 18.0 g/dL Final    Hematocrit 09/08/2022 27.6 (L)  40.0 - 54.0 % Final    MCV 09/08/2022 96  82 - 98 fL Final    MCH 09/08/2022 33.4 (H)  27.0 - 31.0 pg Final    MCHC 09/08/2022 34.8  32.0 - 36.0 g/dL Final    RDW 09/08/2022 13.2  11.5 - 14.5 % Final    Platelets 09/08/2022 147 (L)  150 - 450 K/uL Final    MPV 09/08/2022 10.8  9.2 - 12.9 fL Final    Immature Granulocytes 09/08/2022 0.0  0.0 - 0.5 % Final    Gran # (ANC) 09/08/2022 2.4  1.8 - 7.7 K/uL Final    Immature Grans (Abs) 09/08/2022 0.00  0.00 - 0.04 K/uL Final    Comment: Mild elevation in immature granulocytes is non specific and   can be seen in a variety of conditions including stress response,   acute  inflammation, trauma and pregnancy. Correlation with other   laboratory and clinical findings is essential.      Lymph # 09/08/2022 2.2  1.0 - 4.8 K/uL Final    Mono # 09/08/2022 0.6  0.3 - 1.0 K/uL Final    Eos # 09/08/2022 0.3  0.0 - 0.5 K/uL Final    Baso # 09/08/2022 0.08  0.00 - 0.20 K/uL Final    nRBC 09/08/2022 0  0 /100 WBC Final    Gran % 09/08/2022 43.1  38.0 - 73.0 % Final    Lymph % 09/08/2022 40.0  18.0 - 48.0 % Final    Mono % 09/08/2022 10.1  4.0 - 15.0 % Final    Eosinophil % 09/08/2022 5.3  0.0 - 8.0 % Final    Basophil % 09/08/2022 1.5  0.0 - 1.9 % Final    Differential Method 09/08/2022 Automated   Final    TSH 09/08/2022 0.749  0.400 - 4.000 uIU/mL Final    Sodium 09/08/2022 133 (L)  136 - 145 mmol/L Final    Potassium 09/08/2022 4.1  3.5 - 5.1 mmol/L Final    Chloride 09/08/2022 105  95 - 110 mmol/L Final    CO2 09/08/2022 21 (L)  23 - 29 mmol/L Final    Glucose 09/08/2022 90  70 - 110 mg/dL Final    BUN 09/08/2022 16  8 - 23 mg/dL Final    Creatinine 09/08/2022 1.0  0.5 - 1.4 mg/dL Final    Calcium 09/08/2022 9.4  8.7 - 10.5 mg/dL Final    Total Protein 09/08/2022 8.8 (H)  6.0 - 8.4 g/dL Final    Albumin 09/08/2022 3.2 (L)  3.5 - 5.2 g/dL Final    Total Bilirubin 09/08/2022 1.0  0.1 - 1.0 mg/dL Final    Comment: For infants and newborns, interpretation of results should be based  on gestational age, weight and in agreement with clinical  observations.    Premature Infant recommended reference ranges:  Up to 24 hours.............<8.0 mg/dL  Up to 48 hours............<12.0 mg/dL  3-5 days..................<15.0 mg/dL  6-29 days.................<15.0 mg/dL      Alkaline Phosphatase 09/08/2022 82  55 - 135 U/L Final    AST 09/08/2022 33  10 - 40 U/L Final    ALT 09/08/2022 18  10 - 44 U/L Final    Anion Gap 09/08/2022 7 (L)  8 - 16 mmol/L Final    eGFR 09/08/2022 >60.0  >60 mL/min/1.73 m^2 Final    Prothrombin Time 09/08/2022 11.1  9.0 - 12.5 sec Final    INR 09/08/2022 1.1  0.8 - 1.2 Final     Comment: Coumadin Therapy:  2.0 - 3.0 for INR for all indicators except mechanical heart valves  and antiphospholipid syndromes which should use 2.5 - 3.5.           Imaging: Reviewed    MRI of the abdomen and pelvis 6/17/22 showed  Image quality is degraded by motion artifact.     Inferior Thorax: Small bilateral pleural effusions.  Few bibasilar patchy opacities, better appreciated on prior CT.     Liver: Upper limit of normal size.  Nodular contour suggestive for cirrhosis.  Large heterogeneously enhancing diffusion restricting soft tissue mass centered within the IVC extending to the right atrium.  Lesion measures approximately 7.6 x 5.9 x 4.0 cm (series 24, image 32; series 22, image 22).  Mass extends into and occludes the right hepatic vein.  Hypoenhancing diffusion restricting mass within the inferior right hepatic lobe segment 5 measuring 5.6 x 5.1 cm (series 22, image 53).  Portal veins appear patent.     Gallbladder: Gallbladder is contracted and contains multiple stones.  Hepatic segment 5 mass closely abuts and may involve the adjacent gallbladder.     Bile Ducts: No significant ductal dilatation.     Pancreas: No focal mass or ductal dilatation.     Spleen: Mildly enlarged measuring 13 cm.  No focal lesion.     Adrenals: Unremarkable.     Kidneys/Ureters: Few small bilateral cysts.  No solid enhancing renal mass.  No hydronephrosis.     Bladder: Circumferential wall thickening and surrounding fat stranding.     Prostate: Unremarkable.     GI Tract/Mesentery: No evidence of bowel obstruction or inflammation.  Colonic diverticulosis.     Peritoneal Space: Small volume abdominopelvic ascites.     Retroperitoneum: Several mildly enlarged re hepatic lymph nodes measuring up to 1.4 cm.     Abdominal wall: Mild body wall edema.     Vasculature: Abdominal aorta is normal in caliber and contains atherosclerosis. Splenic vein and SMV are patent.  Upper abdominal collateral vessels.  Infrarenal IVC filter  noted. Thin filling defect within the left superficial femoral and common femoral veins consistent with known partially occlusive DVT.     Bones: Diffusely low marrow signal concerning for a marrow replacement process.        Assessment:       1. HCC (hepatocellular carcinoma)    2. Mild intermittent reactive airway disease without complication    3. Hepatitis C virus infection without hepatic coma, unspecified chronicity    4. Normocytic anemia    5. Thrombocytopenia    6. Hypertension, unspecified type    7. Cancer related pain    8. Chronic left-sided low back pain without sciatica           Plan:     HCC - The patient has HCC with a large liver mass as well as an IVC mass extending into the right atrium  -Pt following with palliative care  -Will proceed with C3D1 of pembrolizumab  -Pt to undergo CT C/A/P prior to next appt    Hepatitis C - Pt currently being managed by Dr David Bains in [facility] ([phone])  -PT not currently on treatment  -Will monitor    Anemia of Chronic Disease - Iron studies from 6/28/22 concerning for anemia of chrocic disease  -Hemoglobin is 9.6g/dL on 9/08/22  -Will montor    Thrombocytopenia - likely due to liver disease  -platelets are 147k on 9/08/22  -Prior peripheral smear showed no significant findings    HTN - pt on amlodipine  -Controlled   -Will monitor    Cancer Related Pain - Chest pain due to malignancy and prior cardiac work up negative  - pt on oxycodone 5mg every 4 hours but only taking it twice a day  -Pt states the 5mg tablets do not give him relief  -Will prescribe 10mg tablets  -Pt instructed to take the medicine when he is in pain to see if he would qualify for long acting pain medicine  -Will monitor    Back Pain - pt with chronic lower back pain proceeding his cancer diagnosis  -Pt to see orthopedics    Advance Care Planning     Date: 07/14/2022    Power of   I initiated the process of advance care planning today and explained the importance of this  process to the patient.  I introduced the concept of advance directives to the patient, as well. Then the patient received detailed information about the importance of designating a Health Care Power of  (HCPOA). He was also instructed to communicate with this person about their wishes for future healthcare, should he become sick and lose decision-making capacity. The patient has previously appointed a HCPOA. After our discussion, the patient has decided to complete a HCPOA and has appointed health care agent:  Shikha Dias  & health care agent number:  [phone]  .              Route Chart for Scheduling    Med Onc Chart Routing      Follow up with physician 3 weeks. The patient can proceed with treatment. He will need a CT C/A/P the week of 9/19/22.   He needs a CBC, CMP, TSH and INR on 9/29/22 with an appt with me and treatment on 9/30/22.  The pa   Follow up with ARLYN    Infusion scheduling note    Injection scheduling note    Labs    Imaging    Pharmacy appointment    Other referrals        Treatment Plan Information   OP PEMBROLIZUMAB 200MG Q3W   Srinivas Coronel MD   Upcoming Treatment Dates - OP PEMBROLIZUMAB 200MG Q3W    9/30/2022       Chemotherapy       pembrolizumab (KEYTRUDA) 200 mg in sodium chloride 0.9% 108 mL infusion  10/21/2022       Chemotherapy       pembrolizumab (KEYTRUDA) 200 mg in sodium chloride 0.9% 108 mL infusion  11/11/2022       Chemotherapy       pembrolizumab (KEYTRUDA) 200 mg in sodium chloride 0.9% 108 mL infusion  12/2/2022       Chemotherapy       pembrolizumab (KEYTRUDA) 200 mg in sodium chloride 0.9% 108 mL infusion      Srinivas Coronel MD  [facility]  Hematology and Oncology  [facility]   [address] [place] [address]   O: [phone]  F: [phone]

## 2022-09-09 NOTE — PLAN OF CARE
Problem: Adult Inpatient Plan of Care  Goal: Patient-Specific Goal (Individualized)  Outcome: Ongoing, Progressing  Flowsheets (Taken 9/9/2022 1149)  Anxieties, Fears or Concerns:   hurting down his left side   MD adjusted pain medications  Individualized Care Needs:   recliner, blanket, pillow, dimmed lights, emotional support   contacted MELANIE Decker, for food pantry items  Patient-Specific Goals (Include Timeframe): no s/s rxn during txt     Problem: Fatigue  Goal: Improved Activity Tolerance  Intervention: Promote Improved Energy  Flowsheets (Taken 9/9/2022 1149)  Fatigue Management: frequent rest breaks encouraged  Sleep/Rest Enhancement:   natural light exposure provided   noise level reduced  Activity Management:   Ambulated -L4   Ambulated in mensah - L4

## 2022-09-09 NOTE — PLAN OF CARE
Problem: Adult Inpatient Plan of Care  Goal: Plan of Care Review  Outcome: Ongoing, Progressing  Flowsheets (Taken 9/9/2022 1241)  Plan of Care Reviewed With: patient     Patient tolerated keytruda treatment well. IV flushed with blood return, saline locked, and removed. AVS provided and reviewed. Ambulates per self. No acute distress noted.

## 2022-09-16 NOTE — PROGRESS NOTES
Chart reviewed post 3rd keytruda infusion and follow up. Restaging scans scheduled for Sept. 21.   Will continue to follow for navigational needs.

## 2022-09-30 NOTE — PLAN OF CARE
Problem: Adult Inpatient Plan of Care  Goal: Plan of Care Review  Outcome: Ongoing, Progressing  Flowsheets (Taken 9/30/2022 1101)  Plan of Care Reviewed With: patient  Goal: Patient-Specific Goal (Individualized)  Outcome: Ongoing, Progressing  Flowsheets (Taken 9/30/2022 1101)  Anxieties, Fears or Concerns: None  Individualized Care Needs: Recliner, warm blanket, pillow, tv, dimmed lights, dietician  Patient-Specific Goals (Include Timeframe): Free of S/S of reaction with infusion.     Problem: Fatigue  Goal: Improved Activity Tolerance  Outcome: Ongoing, Progressing  Intervention: Promote Improved Energy  Flowsheets (Taken 9/30/2022 1101)  Fatigue Management:   frequent rest breaks encouraged   paced activity encouraged  Sleep/Rest Enhancement: regular sleep/rest pattern promoted  Activity Management: Ambulated -L4       Patient to Infusion for Keytruda following appointment with Dr. Sherman. Treatment plan reviewed with patient. VSS. Tolerated treatment. Met with dietician during visit. Provided with copy of upcoming appointment schedule. Escorted to the front lobby for discharge to home.

## 2022-09-30 NOTE — PROGRESS NOTES
PATIENT: Irving Linn  MRN: [id number]  DATE: 9/30/2022      Diagnosis:   1. HCC (hepatocellular carcinoma)    2. Hepatitis C virus infection without hepatic coma, unspecified chronicity    3. Normocytic anemia    4. Thrombocytopenia    5. Hypertension, unspecified type    6. Cancer related pain    7. Chronic left-sided low back pain without sciatica          Chief Complaint: Follow-up (HCC)      Subjective:     Interval History: Mr. Linn is a 62 y.o. male with Cirrhosis, GERD, HTN, Hepatitis C who presents for follow up for HCC.  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    The patient states that he developed blisters on his legs and thighs 9/25/22.  He went to the ER and was instructed to keep the areas clean and dry.  The patient states the blisters are continuing to heal.  He endorses lesions on the lower left leg and ankle and left inner thigh.  He continues to have pain in the left side.  Pt was seen by palliative care today and prescribed a muscle relaxant.  The patient denies CP, cough, SOB, abdominal pain, N/V, constipation, diarrhea.  The patient denies fever, chills, night sweats, weight loss, new lumps or bumps, easy bruising or bleeding.    Prior History: The patient initially presented to the ED on 6/16/22 with complaint of SOB.  US of the LE 6/16/22 showed a nonocclusive thrombus in the distal superficial femoral vein on the left.  CTA C/A/P on 6/16/22 showed a mass in the proximal IVC extending into the right atrium highly suspicious for malignancy and a  rounded lesion in the inferior right lobe of the liver.  US of the abdomen on 6/17/22 showed a 4.8 x 4.6 for 5.2 cm heterogeneous lesion in the right hepatic lobe; echogenic lesion within the IVC measuring 4.9 x 3.3 x 4.2 cm.  MRI of the abdomen and pelvis 6/17/22 showed large heterogeneously enhancing mass within the IVC in standing and the right atrium measuring 7.6 x 5.9 x 4 cm with occlusion of the right hepatic vein; hypoenhancing mass within the inferior right hepatic lobe measuring 5.6 x 5.1 cm; several mildly enlarged re hepatic lymph nodes measuring 1.4 cm; and diffusely lobe marrow signal concerning for marrow replacement process in the bones.   The patient underwent liver biopsy 7/08/22 with path showing HCC.    Past Medical History:   Past Medical History:   Diagnosis Date    Cirrhosis     GERD (gastroesophageal reflux disease)     HTN (hypertension)        Past Surgical HIstory:   Past Surgical History:   Procedure Laterality Date    MANDIBLE FRACTURE SURGERY         Family History:   Family History   Problem Relation Age of Onset    Lung cancer Mother     Cervical cancer Mother     Bladder Cancer Sister     Breast cancer Sister        Social History:  reports that he quit smoking about 15 months ago. His smoking use included cigarettes. He has a 20.00 pack-year smoking history. He has quit using smokeless tobacco.  His smokeless tobacco use included chew. He reports current drug use. He reports that he does not drink alcohol.    Allergies:  Review of patient's allergies indicates:  No Known Allergies    Medications:  Current Outpatient Medications   Medication Sig Dispense Refill    albuterol (PROVENTIL/VENTOLIN HFA) 90 mcg/actuation inhaler Inhale 2 puffs into the lungs every 4 (four) hours as needed for Shortness of Breath. 18 g 6    amLODIPine (NORVASC) 5 MG tablet Take 5 mg by mouth every morning.      ondansetron (ZOFRAN-ODT) 8 MG TbDL Take 1 tablet (8 mg total) by mouth every 8 (eight) hours  "as needed (nausea). 40 tablet 3    traZODone (DESYREL) 50 MG tablet Take 50 mg by mouth nightly as needed for Insomnia.      cyclobenzaprine (FLEXERIL) 5 MG tablet Take 1 tablet (5 mg total) by mouth 3 (three) times daily as needed for Muscle spasms. 45 tablet 0    loperamide (IMODIUM) 2 mg capsule Take 1 capsule (2 mg total) by mouth 4 (four) times daily as needed for Diarrhea. (Patient not taking: Reported on 9/30/2022) 30 capsule 2    oxyCODONE (ROXICODONE) 10 mg Tab immediate release tablet Take 1 tablet (10 mg total) by mouth every 4 (four) hours as needed for Pain. 60 tablet 0    SENNA 8.6 mg tablet Take 1 tablet by mouth once daily. 30 tablet 0     No current facility-administered medications for this visit.       Review of Systems   Constitutional:  Negative for appetite change, chills, diaphoresis, fatigue, fever and unexpected weight change.   HENT:  Negative for mouth sores and nosebleeds.    Eyes:  Negative for visual disturbance.   Respiratory:  Negative for cough and shortness of breath.    Cardiovascular:  Negative for chest pain and palpitations.   Gastrointestinal:  Negative for abdominal pain, constipation, diarrhea, nausea and vomiting.   Genitourinary:  Negative for frequency.   Musculoskeletal:  Positive for back pain.        Left hip pain   Skin:  Negative for color change and rash.   Neurological:  Negative for headaches.   Hematological:  Negative for adenopathy. Does not bruise/bleed easily.   Psychiatric/Behavioral:  The patient is not nervous/anxious.      ECOG Performance Status: 1   Objective:      Vitals:   Vitals:    09/30/22 0901   BP: 103/72   BP Location: Left arm   Patient Position: Sitting   BP Method: Medium (Automatic)   Pulse: 72   Resp: 18   Temp: 96.7 °F (35.9 °C)   TempSrc: Temporal   SpO2: 96%   Weight: 69.8 kg (153 lb 14.1 oz)   Height: 5' 8" (1.727 m)       Physical Exam  Constitutional:       General: He is not in acute distress.     Appearance: He is well-developed. He " is not diaphoretic.   HENT:      Head: Normocephalic and atraumatic.   Cardiovascular:      Rate and Rhythm: Normal rate and regular rhythm.      Heart sounds: Normal heart sounds. No murmur heard.    No friction rub. No gallop.   Pulmonary:      Effort: Pulmonary effort is normal. No respiratory distress.      Breath sounds: No wheezing, rhonchi or rales.   Chest:      Chest wall: No tenderness.   Abdominal:      General: Bowel sounds are normal. There is no distension.      Palpations: Abdomen is soft. There is no mass.      Tenderness: There is no abdominal tenderness. There is no rebound.   Musculoskeletal:      Cervical back: Normal range of motion.   Lymphadenopathy:      Cervical: No cervical adenopathy.      Upper Body:      Right upper body: No supraclavicular or axillary adenopathy.      Left upper body: No supraclavicular or axillary adenopathy.   Skin:     General: Skin is warm and dry.      Findings: No erythema or rash.   Neurological:      Mental Status: He is alert and oriented to person, place, and time.   Psychiatric:         Behavior: Behavior normal.       Laboratory Data:  Lab Visit on 09/29/2022   Component Date Value Ref Range Status    WBC 09/29/2022 3.54 (L)  3.90 - 12.70 K/uL Final    RBC 09/29/2022 3.03 (L)  4.60 - 6.20 M/uL Final    Hemoglobin 09/29/2022 9.1 (L)  14.0 - 18.0 g/dL Final    Hematocrit 09/29/2022 28.9 (L)  40.0 - 54.0 % Final    MCV 09/29/2022 95  82 - 98 fL Final    MCH 09/29/2022 30.0  27.0 - 31.0 pg Final    MCHC 09/29/2022 31.5 (L)  32.0 - 36.0 g/dL Final    RDW 09/29/2022 13.2  11.5 - 14.5 % Final    Platelets 09/29/2022 87 (L)  150 - 450 K/uL Final    MPV 09/29/2022 11.9  9.2 - 12.9 fL Final    Immature Granulocytes 09/29/2022 0.6 (H)  0.0 - 0.5 % Final    Gran # (ANC) 09/29/2022 1.6 (L)  1.8 - 7.7 K/uL Final    Immature Grans (Abs) 09/29/2022 0.02  0.00 - 0.04 K/uL Final    Comment: Mild elevation in immature granulocytes is non specific and   can be seen in a variety  of conditions including stress response,   acute inflammation, trauma and pregnancy. Correlation with other   laboratory and clinical findings is essential.      Lymph # 09/29/2022 1.3  1.0 - 4.8 K/uL Final    Mono # 09/29/2022 0.4  0.3 - 1.0 K/uL Final    Eos # 09/29/2022 0.1  0.0 - 0.5 K/uL Final    Baso # 09/29/2022 0.05  0.00 - 0.20 K/uL Final    nRBC 09/29/2022 0  0 /100 WBC Final    Gran % 09/29/2022 45.7  38.0 - 73.0 % Final    Lymph % 09/29/2022 36.2  18.0 - 48.0 % Final    Mono % 09/29/2022 12.4  4.0 - 15.0 % Final    Eosinophil % 09/29/2022 3.7  0.0 - 8.0 % Final    Basophil % 09/29/2022 1.4  0.0 - 1.9 % Final    Differential Method 09/29/2022 Automated   Final    Sodium 09/29/2022 135 (L)  136 - 145 mmol/L Final    Potassium 09/29/2022 4.1  3.5 - 5.1 mmol/L Final    Chloride 09/29/2022 104  95 - 110 mmol/L Final    CO2 09/29/2022 23  23 - 29 mmol/L Final    Glucose 09/29/2022 85  70 - 110 mg/dL Final    BUN 09/29/2022 23  8 - 23 mg/dL Final    Creatinine 09/29/2022 1.1  0.5 - 1.4 mg/dL Final    Calcium 09/29/2022 9.4  8.7 - 10.5 mg/dL Final    Total Protein 09/29/2022 8.5 (H)  6.0 - 8.4 g/dL Final    Albumin 09/29/2022 3.2 (L)  3.5 - 5.2 g/dL Final    Total Bilirubin 09/29/2022 1.0  0.1 - 1.0 mg/dL Final    Comment: For infants and newborns, interpretation of results should be based  on gestational age, weight and in agreement with clinical  observations.    Premature Infant recommended reference ranges:  Up to 24 hours.............<8.0 mg/dL  Up to 48 hours............<12.0 mg/dL  3-5 days..................<15.0 mg/dL  6-29 days.................<15.0 mg/dL      Alkaline Phosphatase 09/29/2022 82  55 - 135 U/L Final    AST 09/29/2022 110 (H)  10 - 40 U/L Final    ALT 09/29/2022 88 (H)  10 - 44 U/L Final    Anion Gap 09/29/2022 8  8 - 16 mmol/L Final    eGFR 09/29/2022 >60.0  >60 mL/min/1.73 m^2 Final    Prothrombin Time 09/29/2022 11.7  9.0 - 12.5 sec Final    INR 09/29/2022 1.1  0.8 - 1.2 Final     Comment: Coumadin Therapy:  2.0 - 3.0 for INR for all indicators except mechanical heart valves  and antiphospholipid syndromes which should use 2.5 - 3.5.      TSH 09/29/2022 2.462  0.400 - 4.000 uIU/mL Final         Imaging:     CT C/A/P 9/21/22  Chest; fairly diffusely scattered blebs and lucencies with innumerable subpleural blebs there is mild scattered bronchiectasis.  Dependent hypoventilatory changes and peripheral reticulation.  No significant hilar or mediastinal adenopathy.  No pleural or pericardial effusion.  Vascular calcifications including coronary artery calcifications.     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  Caudal most extent difficult to assess with the IVC not as opacified today with difference in timing of the imaging relative to the injection.  IVC below the level of the renal veins is not opacified for evaluation.  The intrahepatic portion of the IVC appears partially opacified and the thrombus is estimated to measure approximately 6 x 5.9 by 11 cm appearing similar to the prior exam.  Hepatic mass corresponding the patient's known HCC measures 5.8 by 5.7 by 4.4 cm today versus 5.6 x 5.2 by 3.7 cm previously.  Enlarged re hepatis nodes for example axial series 3, image 51 1.6 by 2.0 cm not significantly changed.     The portal vein is patent.  Periportal edema.  Cholelithiasis without CT findings of acute cholecystitis.  Spleen upper limits of normal in size.  Prominent vessels consistent varices near the EG junction.     Symmetrical renal enhancement and no hydronephrosis.  Pancreas unremarkable appearance.  Abdominal aorta tapers without aneurysmal dilatation     Normal appearance of the appendix.  No free intraperitoneal air or fluid.  No abscess     Urinary bladder just mildly distended at time of the exam and as visualized unremarkable appearance.  Wall appears slightly  thick but accentuated by the incomplete distention recommend correlation clinically if clinical consideration for cystitis or chronic bladder outlet obstruction     Ascites seen on the prior exam is not seen today.     There is large amount of stool within the colon.  No appreciable bowel wall thickening or inflammatory change.           Assessment:       1. HCC (hepatocellular carcinoma)    2. Hepatitis C virus infection without hepatic coma, unspecified chronicity    3. Normocytic anemia    4. Thrombocytopenia    5. Hypertension, unspecified type    6. Cancer related pain    7. Chronic left-sided low back pain without sciatica             Plan:     HCC - The patient has HCC with a large liver mass as well as an IVC mass extending into the right atrium  -Pt following with palliative care  -Repeat staging scans on 9/21/22 show stable disease  -Will proceed with C4 of pembrolizumab  -Pt to return in 3 weeks for cycle 5    Hepatitis C - Pt currently being managed by Dr David Bains in [facility] ([phone])  -PT not currently on treatment  -Will monitor    Anemia of Chronic Disease - Iron studies from 6/28/22 concerning for anemia of chrocic disease  -Hemoglobin is 9.1g/dL on 9/29/22  -Will montor    Thrombocytopenia - likely due to liver disease  -platelets are 87k on 9/29/22  -Prior peripheral smear showed no significant findings    HTN - pt on amlodipine  -Controlled   -Will monitor    Cancer Related Pain - Chest pain due to malignancy and prior cardiac work up negative  -Pt on oxycodone 10mg every 4 hours as needed  -Pt prescribed flexeril by palliative care  -Will monitor    Back Pain - pt with chronic lower back pain proceeding his cancer diagnosis  -Pt to see orthopedics    Advance Care Planning     Date: 07/14/2022    Power of   I initiated the process of advance care planning today and explained the importance of this process to the patient.  I introduced the concept of advance directives to the  patient, as well. Then the patient received detailed information about the importance of designating a Health Care Power of  (HCPOA). He was also instructed to communicate with this person about their wishes for future healthcare, should he become sick and lose decision-making capacity. The patient has previously appointed a HCPOA. After our discussion, the patient has decided to complete a HCPOA and has appointed health care agent:  Shikha Arun  & health care agent number:  [phone]  .         Route Chart for Scheduling    Med Onc Chart Routing      Follow up with physician 3 weeks. The patient can proceed with treatment. He will need a CBC, CMP, TSH, INR, and AFP on 10/20/22 with an appt with me and treatment on 10/21/22.   Follow up with ARLYN    Infusion scheduling note    Injection scheduling note    Labs    Imaging    Pharmacy appointment    Other referrals        Treatment Plan Information   OP PEMBROLIZUMAB 200MG Q3W   Srinivas Coronel MD   Upcoming Treatment Dates - OP PEMBROLIZUMAB 200MG Q3W    10/21/2022       Chemotherapy       pembrolizumab (KEYTRUDA) 200 mg in sodium chloride 0.9% 108 mL infusion  11/11/2022       Chemotherapy       pembrolizumab (KEYTRUDA) 200 mg in sodium chloride 0.9% 108 mL infusion  12/2/2022       Chemotherapy       pembrolizumab (KEYTRUDA) 200 mg in sodium chloride 0.9% 108 mL infusion  12/23/2022       Chemotherapy       pembrolizumab (KEYTRUDA) 200 mg in sodium chloride 0.9% 108 mL infusion      Srinivas Coronel MD  [facility]  Hematology and Oncology  [facility]   [address] [place] [address]   O: [phone]  F: [phone]

## 2022-09-30 NOTE — PROGRESS NOTES
Oncology Nutrition   Chemotherapy Infusion Visit    Nutrition Follow Up   RD met with patient at chairside during infusion treatment. Patient states he is doing okay with his nutrition, weight has been stable this past month. Pt's insurance was able to cover Ensure Original and patient states he has been getting them delivered to his house. He drinks two per day. Encouraged patient to keep doing this.     Pt requesting TFP order- filled and provided by this RD.     Wt Readings from Last 10 Encounters:   09/30/22 69.8 kg (153 lb 14.1 oz)   09/30/22 69.8 kg (153 lb 14.1 oz)   09/25/22 70.2 kg (154 lb 12.2 oz)   09/09/22 70.2 kg (154 lb 12.2 oz)   09/09/22 70.2 kg (154 lb 12.2 oz)   08/19/22 74.9 kg (165 lb 2 oz)   08/19/22 74.2 kg (163 lb 9.3 oz)   07/29/22 75.6 kg (166 lb 10.7 oz)   07/29/22 75.6 kg (166 lb 10.7 oz)   07/28/22 75.4 kg (166 lb 1.9 oz)       All other nutrition questions/concerns addressed as appropriate. Will continue to follow and monitor throughout treatment PRN.     Brianne López MS, RD, LDN  09/30/2022  12:03 PM

## 2022-09-30 NOTE — PATIENT INSTRUCTIONS
Start Cyclobenzaprine 5 mg  at night, ok to take additional dose in daytime as needed for left buttocks pain      Start Senna Daily for bowel management

## 2022-10-21 NOTE — TELEPHONE ENCOUNTER
----- Message from Javed Dias sent at 10/21/2022 12:06 AM CDT -----  Regarding: Client Service  Contact: [phone]  Good Morning,     My name is Javed Dias, I work in the Lab Client Services. We had a problem with some lab work on this patient. If someone from your office could call us at [phone] or [phone] that would be great. Anyone in my department can help.      Thank you

## 2022-10-21 NOTE — TELEPHONE ENCOUNTER
I called and was informed that the pt's CBC had clotted this AM.  I instructed them that I would order another CBC.      Srinivas Coronel MD  [facility]  Hematology and Oncology  [facility]   [address]   [place] [id number]   O: [phone]  F: [phone]

## 2022-10-21 NOTE — PROGRESS NOTES
PATIENT: Irving Linn  MRN: [id number]  DATE: 10/21/2022      Diagnosis:   1. HCC (hepatocellular carcinoma)    2. Hepatitis C virus infection without hepatic coma, unspecified chronicity    3. Normocytic anemia    4. Thrombocytopenia    5. Hypertension, unspecified type    6. Cancer related pain    7. Chronic left-sided low back pain without sciatica    8. Tremors of nervous system            Chief Complaint: Establish Care (2 weeks labs and tx), Follow-up, and Hepatic Cancer      Subjective:     Interval History: Mr. Linn is a 62 y.o. male with Cirrhosis, GERD, HTN, Hepatitis C who presents for follow up for HCC.  Since the last clinic visit the patient states he developed shakiness in his hand after starting on Flexeril.  He endorses difficulty sleeping and states he takes half a trazodone at night.  The patient denies CP, cough, SOB, abdominal pain, N/V, constipation, diarrhea.  The patient denies fever, chills, night sweats, weight loss, new lumps or bumps, easy bruising or bleeding.    Prior History: The patient initially presented to the ED on 6/16/22 with complaint of SOB.  US of the LE 6/16/22 showed a nonocclusive thrombus in the distal superficial femoral vein on the left.  CTA C/A/P on 6/16/22 showed a mass in the proximal IVC extending into the right atrium highly suspicious for malignancy and a rounded lesion in the inferior right lobe of the liver.  US of the abdomen on 6/17/22 showed a 4.8 x 4.6 for 5.2 cm heterogeneous lesion in the right hepatic lobe; echogenic lesion within the IVC measuring 4.9 x 3.3 x 4.2 cm.  MRI of the abdomen and pelvis 6/17/22 showed large heterogeneously enhancing mass within the IVC in standing and the right atrium measuring 7.6 x 5.9 x 4 cm with occlusion of the right hepatic vein; hypoenhancing mass within the inferior right hepatic lobe measuring 5.6 x 5.1 cm; several mildly enlarged re hepatic lymph nodes measuring 1.4 cm; and diffusely lobe marrow signal  concerning for marrow replacement process in the bones.   The patient underwent liver biopsy 7/08/22 with path showing HCC.   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    Past Medical History:   Past Medical History:   Diagnosis Date    Cirrhosis     GERD (gastroesophageal reflux disease)     HTN (hypertension)        Past Surgical HIstory:   Past Surgical History:   Procedure Laterality Date    MANDIBLE FRACTURE SURGERY         Family History:   Family History   Problem Relation Age of Onset    Lung cancer Mother     Cervical cancer Mother     Bladder Cancer Sister     Breast cancer Sister        Social History:  reports that he quit smoking about 15 months ago. His smoking use included cigarettes. He has a 20.00 pack-year smoking history. He has quit using smokeless tobacco.  His smokeless tobacco use included chew. He reports current drug use. He reports that he does not drink alcohol.    Allergies:  Review of patient's allergies indicates:  No Known Allergies    Medications:  Current Outpatient Medications   Medication Sig Dispense Refill    albuterol (PROVENTIL/VENTOLIN HFA) 90 mcg/actuation inhaler Inhale 2 puffs into the lungs every 4 (four) hours as needed for Shortness of Breath. 18 g 6    amLODIPine (NORVASC) 5 MG tablet Take 5 mg by mouth every morning.      ondansetron (ZOFRAN-ODT) 8 MG TbDL Take 1 tablet (8 mg total) by mouth every 8 (eight) hours as needed (nausea). 40 tablet 3    oxyCODONE (ROXICODONE) 10 mg Tab immediate release tablet Take 1 tablet (10 mg total) by mouth every 4 (four)  hours as needed for Pain. 60 tablet 0    SENNA 8.6 mg tablet Take 1 tablet by mouth once daily. 30 tablet 0    traZODone (DESYREL) 50 MG tablet Take 50 mg by mouth nightly as needed for Insomnia.      loperamide (IMODIUM) 2 mg capsule Take 1 capsule (2 mg total) by mouth 4 (four) times daily as needed for Diarrhea. (Patient not taking: No sig reported) 30 capsule 2     No current facility-administered medications for this visit.       Review of Systems   Constitutional:  Negative for appetite change, chills, diaphoresis, fatigue, fever and unexpected weight change.   HENT:  Negative for mouth sores and nosebleeds.    Eyes:  Negative for visual disturbance.   Respiratory:  Negative for cough and shortness of breath.    Cardiovascular:  Negative for chest pain and palpitations.   Gastrointestinal:  Negative for abdominal pain, constipation, diarrhea, nausea and vomiting.   Genitourinary:  Negative for frequency.   Musculoskeletal:  Positive for back pain.        Left hip pain   Skin:  Negative for color change and rash.   Neurological:  Positive for tremors. Negative for headaches.   Hematological:  Negative for adenopathy. Does not bruise/bleed easily.   Psychiatric/Behavioral:  Positive for sleep disturbance. The patient is not nervous/anxious.      ECOG Performance Status: 1   Objective:      Vitals:   Vitals:    10/21/22 1059   BP: (!) 129/91   BP Location: Left arm   Patient Position: Sitting   BP Method: Medium (Automatic)   Pulse: 81   Resp: 18   Temp: 97.2 °F (36.2 °C)   TempSrc: Temporal   SpO2: 98%   Weight: 71.1 kg (156 lb 12 oz)       Physical Exam  Constitutional:       General: He is not in acute distress.     Appearance: He is well-developed. He is not diaphoretic.   HENT:      Head: Normocephalic and atraumatic.   Cardiovascular:      Rate and Rhythm: Normal rate and regular rhythm.      Heart sounds: Normal heart sounds. No murmur heard.    No friction rub. No gallop.   Pulmonary:      Effort:  Pulmonary effort is normal. No respiratory distress.      Breath sounds: No wheezing, rhonchi or rales.   Chest:      Chest wall: No tenderness.   Abdominal:      General: Bowel sounds are normal. There is no distension.      Palpations: Abdomen is soft. There is no mass.      Tenderness: There is no abdominal tenderness. There is no rebound.   Musculoskeletal:      Cervical back: Normal range of motion.   Lymphadenopathy:      Cervical: No cervical adenopathy.      Upper Body:      Right upper body: No supraclavicular or axillary adenopathy.      Left upper body: No supraclavicular or axillary adenopathy.   Skin:     General: Skin is warm and dry.      Findings: No erythema or rash.   Neurological:      Mental Status: He is alert and oriented to person, place, and time.   Psychiatric:         Behavior: Behavior normal.       Laboratory Data:  Lab Visit on 10/21/2022   Component Date Value Ref Range Status    WBC 10/21/2022 4.90  3.90 - 12.70 K/uL Final    RBC 10/21/2022 3.33 (L)  4.60 - 6.20 M/uL Final    Hemoglobin 10/21/2022 9.7 (L)  14.0 - 18.0 g/dL Final    Hematocrit 10/21/2022 30.5 (L)  40.0 - 54.0 % Final    MCV 10/21/2022 92  82 - 98 fL Final    MCH 10/21/2022 29.1  27.0 - 31.0 pg Final    MCHC 10/21/2022 31.8 (L)  32.0 - 36.0 g/dL Final    RDW 10/21/2022 13.6  11.5 - 14.5 % Final    Platelets 10/21/2022 95 (L)  150 - 450 K/uL Final    MPV 10/21/2022 10.5  9.2 - 12.9 fL Final    Immature Granulocytes 10/21/2022 0.4  0.0 - 0.5 % Final    Gran # (ANC) 10/21/2022 2.1  1.8 - 7.7 K/uL Final    Immature Grans (Abs) 10/21/2022 0.02  0.00 - 0.04 K/uL Final    Comment: Mild elevation in immature granulocytes is non specific and   can be seen in a variety of conditions including stress response,   acute inflammation, trauma and pregnancy. Correlation with other   laboratory and clinical findings is essential.      Lymph # 10/21/2022 2.0  1.0 - 4.8 K/uL Final    Mono # 10/21/2022 0.6  0.3 - 1.0 K/uL Final    Eos #  10/21/2022 0.2  0.0 - 0.5 K/uL Final    Baso # 10/21/2022 0.05  0.00 - 0.20 K/uL Final    nRBC 10/21/2022 0  0 /100 WBC Final    Gran % 10/21/2022 42.3  38.0 - 73.0 % Final    Lymph % 10/21/2022 40.4  18.0 - 48.0 % Final    Mono % 10/21/2022 11.2  4.0 - 15.0 % Final    Eosinophil % 10/21/2022 4.7  0.0 - 8.0 % Final    Basophil % 10/21/2022 1.0  0.0 - 1.9 % Final    Differential Method 10/21/2022 Automated   Final   Lab Visit on 10/20/2022   Component Date Value Ref Range Status    Sodium 10/20/2022 132 (L)  136 - 145 mmol/L Final    Potassium 10/20/2022 4.2  3.5 - 5.1 mmol/L Final    Chloride 10/20/2022 107  95 - 110 mmol/L Final    CO2 10/20/2022 18 (L)  23 - 29 mmol/L Final    Glucose 10/20/2022 84  70 - 110 mg/dL Final    BUN 10/20/2022 15  8 - 23 mg/dL Final    Creatinine 10/20/2022 0.8  0.5 - 1.4 mg/dL Final    Calcium 10/20/2022 9.5  8.7 - 10.5 mg/dL Final    Total Protein 10/20/2022 9.0 (H)  6.0 - 8.4 g/dL Final    Albumin 10/20/2022 3.3 (L)  3.5 - 5.2 g/dL Final    Total Bilirubin 10/20/2022 1.3 (H)  0.1 - 1.0 mg/dL Final    Comment: For infants and newborns, interpretation of results should be based  on gestational age, weight and in agreement with clinical  observations.    Premature Infant recommended reference ranges:  Up to 24 hours.............<8.0 mg/dL  Up to 48 hours............<12.0 mg/dL  3-5 days..................<15.0 mg/dL  6-29 days.................<15.0 mg/dL      Alkaline Phosphatase 10/20/2022 105  55 - 135 U/L Final    AST 10/20/2022 83 (H)  10 - 40 U/L Final    ALT 10/20/2022 49 (H)  10 - 44 U/L Final    Anion Gap 10/20/2022 7 (L)  8 - 16 mmol/L Final    eGFR 10/20/2022 >60.0  >60 mL/min/1.73 m^2 Final    Prothrombin Time 10/20/2022 11.3  9.0 - 12.5 sec Final    INR 10/20/2022 1.1  0.8 - 1.2 Final    Comment: Coumadin Therapy:  2.0 - 3.0 for INR for all indicators except mechanical heart valves  and antiphospholipid syndromes which should use 2.5 - 3.5.      TSH 10/20/2022 0.496  0.400 -  4.000 uIU/mL Final    AFP 10/20/2022 9243 (H)  0.0 - 8.4 ng/mL Final    Comment: The testing method is a chemiluminescent microparticle immunoassay   manufactured by [employer] and performed on the [employer]   or   [employer] system. Values obtained with different assay manufacturers   for   methods may be different and cannot be used interchangeably.           Imaging:     CT C/A/P 9/21/22  Chest; fairly diffusely scattered blebs and lucencies with innumerable subpleural blebs there is mild scattered bronchiectasis.  Dependent hypoventilatory changes and peripheral reticulation.  No significant hilar or mediastinal adenopathy.  No pleural or pericardial effusion.  Vascular calcifications including coronary artery calcifications.     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  Caudal most extent difficult to assess with the IVC not as opacified today with difference in timing of the imaging relative to the injection.  IVC below the level of the renal veins is not opacified for evaluation.  The intrahepatic portion of the IVC appears partially opacified and the thrombus is estimated to measure approximately 6 x 5.9 by 11 cm appearing similar to the prior exam.  Hepatic mass corresponding the patient's known HCC measures 5.8 by 5.7 by 4.4 cm today versus 5.6 x 5.2 by 3.7 cm previously.  Enlarged re hepatis nodes for example axial series 3, image 51 1.6 by 2.0 cm not significantly changed.     The portal vein is patent.  Periportal edema.  Cholelithiasis without CT findings of acute cholecystitis.  Spleen upper limits of normal in size.  Prominent vessels consistent varices near the EG junction.     Symmetrical renal enhancement and no hydronephrosis.  Pancreas unremarkable appearance.  Abdominal aorta tapers without aneurysmal dilatation     Normal appearance of the appendix.  No free intraperitoneal  air or fluid.  No abscess     Urinary bladder just mildly distended at time of the exam and as visualized unremarkable appearance.  Wall appears slightly thick but accentuated by the incomplete distention recommend correlation clinically if clinical consideration for cystitis or chronic bladder outlet obstruction     Ascites seen on the prior exam is not seen today.     There is large amount of stool within the colon.  No appreciable bowel wall thickening or inflammatory change.           Assessment:       1. HCC (hepatocellular carcinoma)    2. Hepatitis C virus infection without hepatic coma, unspecified chronicity    3. Normocytic anemia    4. Thrombocytopenia    5. Hypertension, unspecified type    6. Cancer related pain    7. Chronic left-sided low back pain without sciatica    8. Tremors of nervous system               Plan:     HCC - The patient has HCC with a large liver mass as well as an IVC mass extending into the right atrium  -Pt following with palliative care  -Repeat staging scans on 9/21/22 showed stable disease  -Will proceed with C4 of pembrolizumab  -Pt to return in 3 weeks for cycle 6    Hepatitis C - Pt currently being managed by Dr David Bains in [facility] ([phone])  -PT not currently on treatment  -Will monitor    Anemia of Chronic Disease - Iron studies from 6/28/22 concerning for anemia of chrocic disease  -Hemoglobin is 9.7g/dL on 10/21/22  -Will montor    Thrombocytopenia - likely due to liver disease  -platelets are 95k on 10/21/22  -Prior peripheral smear showed no significant findings    HTN - pt on amlodipine  -Controlled   -Will monitor    Cancer Related Pain - Chest pain due to malignancy and prior cardiac work up negative  -Pt on oxycodone 10mg every 4 hours as needed  -Flexeril likely causing tremors see below  -Will monitor    Tremors - pt instructed to stop taking flexeril  -Will monitor    Advance Care Planning     Date: 07/14/2022    Power of   I initiated the  process of advance care planning today and explained the importance of this process to the patient.  I introduced the concept of advance directives to the patient, as well. Then the patient received detailed information about the importance of designating a Health Care Power of  (HCPOA). He was also instructed to communicate with this person about their wishes for future healthcare, should he become sick and lose decision-making capacity. The patient has previously appointed a HCPOA. After our discussion, the patient has decided to complete a HCPOA and has appointed health care agent:  Shikha Dias  & health care agent number:  [phone]  .         Route Chart for Scheduling    Med Onc Chart Routing      Follow up with physician 3 weeks. The patient can proceed with treatment.  He will need a CBC, CMP, INR. TSH, and AFP on 11/10/22 with an appt with me and treatment on 11/11/22.   Follow up with ARLYN    Infusion scheduling note    Injection scheduling note    Labs    Imaging    Pharmacy appointment    Other referrals        Treatment Plan Information   OP PEMBROLIZUMAB 200MG Q3W   Srinivas Coronel MD   Upcoming Treatment Dates - OP PEMBROLIZUMAB 200MG Q3W    10/21/2022       Chemotherapy       pembrolizumab (KEYTRUDA) 200 mg in sodium chloride 0.9% 108 mL infusion  11/11/2022       Chemotherapy       pembrolizumab (KEYTRUDA) 200 mg in sodium chloride 0.9% 108 mL infusion  12/2/2022       Chemotherapy       pembrolizumab (KEYTRUDA) 200 mg in sodium chloride 0.9% 108 mL infusion  12/23/2022       Chemotherapy       pembrolizumab (KEYTRUDA) 200 mg in sodium chloride 0.9% 108 mL infusion      Srinivas Coronel MD  [facility]  Hematology and Oncology  [facility]   [address] [place] [address]   O: [phone]  F: [phone]

## 2022-10-21 NOTE — PROGRESS NOTES
Oncology   Chemotherapy Infusion Visit    Quick Social Service Status Follow Up   Met w/ pt briefly to follow up on social and emotional needs since initiation of treatment.      SW met with pt and discussed upcoming holiday resources. Pt would like to receive a Thanksgiving box. SW signed pt up for this resource. Pt lives far form the cancer center. Pt requested his sister Shikha Dias be allowed to  the box for him. SW informed him his sister can  the food for him to save him a trip here. Pt thankful. No other needs noted today.            Gillian Brown, YOVANI  10/21/2022  2:54 PM

## 2022-11-10 NOTE — PROGRESS NOTES
PATIENT: Irving Linn  MRN: [id number]  DATE: 11/11/2022      Diagnosis:   1. HCC (hepatocellular carcinoma)    2. Hepatic encephalopathy    3. Hepatitis C virus infection without hepatic coma, unspecified chronicity    4. Normocytic anemia    5. Thrombocytopenia    6. Hypertension, unspecified type    7. Cancer related pain              Chief Complaint: HCC (hepatocellular carcinoma)      Subjective:     Interval History: Mr. Linn is a 62 y.o. male with Cirrhosis, GERD, HTN, Hepatitis C who presents for follow up for HCC.  Since the last clinic visit the patient states he is had nausea, stomachaches, and constipation.  He also endorses occasional shakiness of his hands and tendency to drop things.  The patient denies CP, cough, SOB, abdominal pain, N/V, constipation, diarrhea.  The patient denies fever, chills, night sweats, weight loss, new lumps or bumps, easy bruising or bleeding.    Prior History: The patient initially presented to the ED on 6/16/22 with complaint of SOB.  US of the LE 6/16/22 showed a nonocclusive thrombus in the distal superficial femoral vein on the left.  CTA C/A/P on 6/16/22 showed a mass in the proximal IVC extending into the right atrium highly suspicious for malignancy and a rounded lesion in the inferior right lobe of the liver.  US of the abdomen on 6/17/22 showed a 4.8 x 4.6 for 5.2 cm heterogeneous lesion in the right hepatic lobe; echogenic lesion within the IVC measuring 4.9 x 3.3 x 4.2 cm.  MRI of the abdomen and pelvis 6/17/22 showed large heterogeneously enhancing mass within the IVC in standing and the right atrium measuring 7.6 x 5.9 x 4 cm with occlusion of the right hepatic vein; hypoenhancing mass within the inferior right hepatic lobe measuring 5.6 x 5.1 cm; several mildly enlarged re hepatic lymph nodes measuring 1.4 cm; and diffusely lobe marrow signal concerning for marrow replacement process in the bones.   The patient underwent liver biopsy 7/08/22 with path  showing HCC.   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    Past Medical History:   Past Medical History:   Diagnosis Date    Cirrhosis     GERD (gastroesophageal reflux disease)     HTN (hypertension)        Past Surgical HIstory:   Past Surgical History:   Procedure Laterality Date    MANDIBLE FRACTURE SURGERY         Family History:   Family History   Problem Relation Age of Onset    Lung cancer Mother     Cervical cancer Mother     Bladder Cancer Sister     Breast cancer Sister        Social History:  reports that he quit smoking about 16 months ago. His smoking use included cigarettes. He has a 20.00 pack-year smoking history. He has quit using smokeless tobacco.  His smokeless tobacco use included chew. He reports current drug use. He reports that he does not drink alcohol.    Allergies:  Review of patient's allergies indicates:  No Known Allergies    Medications:  Current Outpatient Medications   Medication Sig Dispense Refill    albuterol (PROVENTIL/VENTOLIN HFA) 90 mcg/actuation inhaler Inhale 2 puffs into the lungs every 4 (four) hours as needed for Shortness of Breath. 18 g 6    amLODIPine (NORVASC) 5 MG tablet Take 5 mg by mouth every morning.      loperamide (IMODIUM) 2 mg capsule Take 1 capsule (2 mg total) by mouth 4 (four) times daily as needed for Diarrhea. 30 capsule 2    ondansetron (ZOFRAN-ODT) 8 MG TbDL Take 1 tablet (8 mg total) by mouth every 8 (eight) hours as needed (nausea). 40 tablet 3    traZODone (DESYREL) 50 MG tablet Take 50 mg by mouth nightly as needed for Insomnia.       "lactulose (CEPHULAC) 20 gram Pack Take 1 packet (20 g total) by mouth 3 (three) times daily. 30 packet 6    oxyCODONE (ROXICODONE) 10 mg Tab immediate release tablet Take 1 tablet (10 mg total) by mouth every 4 (four) hours as needed for Pain. 60 tablet 0     No current facility-administered medications for this visit.       Review of Systems   Constitutional:  Negative for appetite change, chills, diaphoresis, fatigue, fever and unexpected weight change.   HENT:  Negative for mouth sores and nosebleeds.    Eyes:  Negative for visual disturbance.   Respiratory:  Negative for cough and shortness of breath.    Cardiovascular:  Negative for chest pain and palpitations.   Gastrointestinal:  Positive for abdominal pain, constipation and nausea. Negative for diarrhea and vomiting.   Genitourinary:  Negative for frequency.   Skin:  Negative for color change and rash.   Neurological:  Positive for tremors. Negative for headaches.   Hematological:  Negative for adenopathy. Does not bruise/bleed easily.   Psychiatric/Behavioral:  The patient is not nervous/anxious.      ECOG Performance Status: 1   Objective:      Vitals:   Vitals:    11/11/22 1100   BP: 118/88   BP Location: Right arm   Patient Position: Sitting   BP Method: Medium (Manual)   Pulse: 72   Temp: 97.4 °F (36.3 °C)   TempSrc: Temporal   SpO2: 98%   Weight: 73 kg (160 lb 15 oz)   Height: 5' 8" (1.727 m)       Physical Exam  Constitutional:       General: He is not in acute distress.     Appearance: He is well-developed. He is not diaphoretic.   HENT:      Head: Normocephalic and atraumatic.   Cardiovascular:      Rate and Rhythm: Normal rate and regular rhythm.      Heart sounds: Normal heart sounds. No murmur heard.    No friction rub. No gallop.   Pulmonary:      Effort: Pulmonary effort is normal. No respiratory distress.      Breath sounds: No wheezing, rhonchi or rales.   Chest:      Chest wall: No tenderness.   Abdominal:      General: Bowel sounds are " normal. There is no distension.      Palpations: Abdomen is soft. There is no mass.      Tenderness: There is no abdominal tenderness. There is no rebound.   Musculoskeletal:      Cervical back: Normal range of motion.   Lymphadenopathy:      Cervical: No cervical adenopathy.      Upper Body:      Right upper body: No supraclavicular or axillary adenopathy.      Left upper body: No supraclavicular or axillary adenopathy.   Skin:     General: Skin is warm and dry.      Findings: No erythema or rash.   Neurological:      Mental Status: He is alert and oriented to person, place, and time.      Comments: Positive asterixis   Psychiatric:         Behavior: Behavior normal.       Laboratory Data:  Lab Visit on 11/10/2022   Component Date Value Ref Range Status    WBC 11/10/2022 4.73  3.90 - 12.70 K/uL Final    RBC 11/10/2022 3.66 (L)  4.60 - 6.20 M/uL Final    Hemoglobin 11/10/2022 11.0 (L)  14.0 - 18.0 g/dL Final    Hematocrit 11/10/2022 35.2 (L)  40.0 - 54.0 % Final    MCV 11/10/2022 96  82 - 98 fL Final    MCH 11/10/2022 30.1  27.0 - 31.0 pg Final    MCHC 11/10/2022 31.3 (L)  32.0 - 36.0 g/dL Final    RDW 11/10/2022 14.3  11.5 - 14.5 % Final    Platelets 11/10/2022 83 (L)  150 - 450 K/uL Final    MPV 11/10/2022 12.9  9.2 - 12.9 fL Final    Immature Granulocytes 11/10/2022 0.4  0.0 - 0.5 % Final    Gran # (ANC) 11/10/2022 2.3  1.8 - 7.7 K/uL Final    Immature Grans (Abs) 11/10/2022 0.02  0.00 - 0.04 K/uL Final    Comment: Mild elevation in immature granulocytes is non specific and   can be seen in a variety of conditions including stress response,   acute inflammation, trauma and pregnancy. Correlation with other   laboratory and clinical findings is essential.      Lymph # 11/10/2022 1.8  1.0 - 4.8 K/uL Final    Mono # 11/10/2022 0.5  0.3 - 1.0 K/uL Final    Eos # 11/10/2022 0.1  0.0 - 0.5 K/uL Final    Baso # 11/10/2022 0.05  0.00 - 0.20 K/uL Final    nRBC 11/10/2022 0  0 /100 WBC Final    Gran % 11/10/2022 48.7  38.0  - 73.0 % Final    Lymph % 11/10/2022 37.2  18.0 - 48.0 % Final    Mono % 11/10/2022 9.9  4.0 - 15.0 % Final    Eosinophil % 11/10/2022 2.7  0.0 - 8.0 % Final    Basophil % 11/10/2022 1.1  0.0 - 1.9 % Final    Differential Method 11/10/2022 Automated   Final    Sodium 11/10/2022 138  136 - 145 mmol/L Final    Potassium 11/10/2022 3.9  3.5 - 5.1 mmol/L Final    Chloride 11/10/2022 107  95 - 110 mmol/L Final    CO2 11/10/2022 23  23 - 29 mmol/L Final    Glucose 11/10/2022 67 (L)  70 - 110 mg/dL Final    BUN 11/10/2022 14  8 - 23 mg/dL Final    Creatinine 11/10/2022 0.9  0.5 - 1.4 mg/dL Final    Calcium 11/10/2022 9.7  8.7 - 10.5 mg/dL Final    Total Protein 11/10/2022 9.2 (H)  6.0 - 8.4 g/dL Final    Albumin 11/10/2022 3.3 (L)  3.5 - 5.2 g/dL Final    Total Bilirubin 11/10/2022 2.0 (H)  0.1 - 1.0 mg/dL Final    Comment: For infants and newborns, interpretation of results should be based  on gestational age, weight and in agreement with clinical  observations.    Premature Infant recommended reference ranges:  Up to 24 hours.............<8.0 mg/dL  Up to 48 hours............<12.0 mg/dL  3-5 days..................<15.0 mg/dL  6-29 days.................<15.0 mg/dL      Alkaline Phosphatase 11/10/2022 124  55 - 135 U/L Final    AST 11/10/2022 128 (H)  10 - 40 U/L Final    ALT 11/10/2022 61 (H)  10 - 44 U/L Final    Anion Gap 11/10/2022 8  8 - 16 mmol/L Final    eGFR 11/10/2022 >60.0  >60 mL/min/1.73 m^2 Final    Prothrombin Time 11/10/2022 13.0 (H)  9.0 - 12.5 sec Final    INR 11/10/2022 1.3 (H)  0.8 - 1.2 Final    Comment: Coumadin Therapy:  2.0 - 3.0 for INR for all indicators except mechanical heart valves  and antiphospholipid syndromes which should use 2.5 - 3.5.      AFP 11/10/2022 62450 (H)  0.0 - 8.4 ng/mL Final    Comment: The testing method is a chemiluminescent microparticle immunoassay   manufactured by [employer] and performed on the    or   [employer] system. Values obtained with different  assay manufacturers   for   methods may be different and cannot be used interchangeably.      TSH 11/10/2022 2.077  0.400 - 4.000 uIU/mL Final         Imaging:     CT C/A/P 9/21/22  Chest; fairly diffusely scattered blebs and lucencies with innumerable subpleural blebs there is mild scattered bronchiectasis.  Dependent hypoventilatory changes and peripheral reticulation.  No significant hilar or mediastinal adenopathy.  No pleural or pericardial effusion.  Vascular calcifications including coronary artery calcifications.     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  Caudal most extent difficult to assess with the IVC not as opacified today with difference in timing of the imaging relative to the injection.  IVC below the level of the renal veins is not opacified for evaluation.  The intrahepatic portion of the IVC appears partially opacified and the thrombus is estimated to measure approximately 6 x 5.9 by 11 cm appearing similar to the prior exam.  Hepatic mass corresponding the patient's known HCC measures 5.8 by 5.7 by 4.4 cm today versus 5.6 x 5.2 by 3.7 cm previously.  Enlarged re hepatis nodes for example axial series 3, image 51 1.6 by 2.0 cm not significantly changed.     The portal vein is patent.  Periportal edema.  Cholelithiasis without CT findings of acute cholecystitis.  Spleen upper limits of normal in size.  Prominent vessels consistent varices near the EG junction.     Symmetrical renal enhancement and no hydronephrosis.  Pancreas unremarkable appearance.  Abdominal aorta tapers without aneurysmal dilatation     Normal appearance of the appendix.  No free intraperitoneal air or fluid.  No abscess     Urinary bladder just mildly distended at time of the exam and as visualized unremarkable appearance.  Wall appears slightly thick but accentuated by the incomplete distention recommend  correlation clinically if clinical consideration for cystitis or chronic bladder outlet obstruction     Ascites seen on the prior exam is not seen today.     There is large amount of stool within the colon.  No appreciable bowel wall thickening or inflammatory change.           Assessment:       1. HCC (hepatocellular carcinoma)    2. Hepatic encephalopathy    3. Hepatitis C virus infection without hepatic coma, unspecified chronicity    4. Normocytic anemia    5. Thrombocytopenia    6. Hypertension, unspecified type    7. Cancer related pain                 Plan:     HCC - The patient has HCC with a large liver mass as well as an IVC mass extending into the right atrium  -Pt following with palliative care  -Repeat staging scans on 9/21/22 showed stable disease  -Will proceed with C6 of pembrolizumab  -AFP increasing  -Will repeat staging scans    Hepatic Encephalopathy - pt with asterixis on exam  -Will have the patient start on lactulose TID  -Will assess response  -Bilirubin and INR elevated.  Will monitor    Hepatitis C - Pt currently being managed by Dr David Bains in [facility] ([phone])  -PT not currently on treatment  -Will monitor    Anemia of Chronic Disease - Iron studies from 6/28/22 concerning for anemia of chrocic disease  -Hemoglobin is 11g/dL on 11/10/22  -Will montor    Thrombocytopenia - likely due to liver disease  -platelets are 83k on 11/11/22  -Prior peripheral smear showed no significant findings    HTN - pt on amlodipine  -Controlled   -Will monitor    Cancer Related Pain - Chest pain due to malignancy and prior cardiac work up negative  -Pt on oxycodone 10mg every 4 hours as needed  -Flexeril likely causing tremors see below  -Will monitor    Constipation - Pt to start on lactulose for hepatic encephalopathy  -Will monitor    Advance Care Planning     Date: 07/14/2022    Power of   I initiated the process of advance care planning today and explained the importance of this  process to the patient.  I introduced the concept of advance directives to the patient, as well. Then the patient received detailed information about the importance of designating a Health Care Power of  (HCPOA). He was also instructed to communicate with this person about their wishes for future healthcare, should he become sick and lose decision-making capacity. The patient has previously appointed a HCPOA. After our discussion, the patient has decided to complete a HCPOA and has appointed health care agent:  Shikha Dias  & health care agent number:  [phone]  .         Route Chart for Scheduling    Med Onc Chart Routing      Follow up with physician 1 week. The patient can proceed with treatment.  He needs a CT C/A/P early next week with an appt with me the day after the scan.   Follow up with ARLYN    Infusion scheduling note    Injection scheduling note    Labs    Imaging    Pharmacy appointment    Other referrals        Treatment Plan Information   OP PEMBROLIZUMAB 200MG Q3W   Srinivas Coronel MD   Upcoming Treatment Dates - OP PEMBROLIZUMAB 200MG Q3W    12/2/2022       Chemotherapy       pembrolizumab (KEYTRUDA) 200 mg in sodium chloride 0.9% 108 mL infusion  12/23/2022       Chemotherapy       pembrolizumab (KEYTRUDA) 200 mg in sodium chloride 0.9% 108 mL infusion  1/13/2023       Chemotherapy       pembrolizumab (KEYTRUDA) 200 mg in sodium chloride 0.9% 108 mL infusion  2/3/2023       Chemotherapy       pembrolizumab (KEYTRUDA) 200 mg in sodium chloride 0.9% 108 mL infusion      Srinivas Coronel MD  [facility]  Hematology and Oncology  [facility]   [address] [place] [address]   O: [phone]  F: [phone]

## 2022-11-11 NOTE — PROGRESS NOTES
Oncology Nutrition   Irving Linn   1960    Therapeutic Food Pantry     Pt eligible for Therapeutic Food Pantry . Order filled out with patient at chairside. All patient questions or concerns regarding  and/or nutrition status addressed. RD to continue to monitor and provide patient food while under active treatment PRN.     Food order filled by this RD- will provide to patient at end of infusion today.    Brianne López MS, RD, LDN  11/11/2022  2:22 PM

## 2022-11-11 NOTE — PLAN OF CARE
Tolerated Keytruda well.  No reactions noted.  No questions or concerns at this time.  Ambulated off unit in NAD.

## 2022-11-29 NOTE — PROGRESS NOTES
PATIENT: Irving Linn  MRN: [id number]  DATE: 11/29/2022      Diagnosis:   1. HCC (hepatocellular carcinoma)    2. Hepatic encephalopathy    3. Hepatitis C virus infection without hepatic coma, unspecified chronicity    4. Normocytic anemia    5. Thrombocytopenia    6. Hypertension, unspecified type    7. Cancer related pain    8. Insomnia, unspecified type                Chief Complaint: Hepatic Cancer (3 week follow up HCC (hepatocellular carcinoma))      Subjective:     Interval History: Mr. Linn is a 62 y.o. male with Cirrhosis, GERD, HTN, Hepatitis C who presents for follow up for HCC.  Since the last clinic visit the patient underwent CT of the chest, abdomen, and pelvis on 11/18/2022 showing tumor thrombus in right atrium not well visualized; right hepatic lobe mass measuring 6 x 6.4 cm from previous by 0.7 x 5.8 cm; soft tissue attenuation from the re hepatis and surrounding the pancreatic head; stable re hepatis lymph node measuring 2.5 cm.  The patient continues to complain of pain in his abdomen.  He states he is not been taking the oxycodone as he did not feel like it was helping.  The patient also complains of occasional nausea and vomiting.  The patient complains of difficulty sleeping but has not been taking trazodone regularly.  He is taken it the last 2 nights.  The patient denies CP, cough, SOB, constipation, diarrhea.  The patient denies fever, chills, night sweats, weight loss, new lumps or bumps, easy bruising or bleeding.    Prior History: The patient initially presented to the ED on 6/16/22 with complaint of SOB.  US of the LE 6/16/22 showed a nonocclusive thrombus in the distal superficial femoral vein on the left.  CTA C/A/P on 6/16/22 showed a mass in the proximal IVC extending into the right atrium highly suspicious for malignancy and a rounded lesion in the inferior right lobe of the liver.  US of the abdomen on 6/17/22 showed a 4.8 x 4.6 for 5.2 cm heterogeneous lesion in the  right hepatic lobe; echogenic lesion within the IVC measuring 4.9 x 3.3 x 4.2 cm.  MRI of the abdomen and pelvis 6/17/22 showed large heterogeneously enhancing mass within the IVC in standing and the right atrium measuring 7.6 x 5.9 x 4 cm with occlusion of the right hepatic vein; hypoenhancing mass within the inferior right hepatic lobe measuring 5.6 x 5.1 cm; several mildly enlarged re hepatic lymph nodes measuring 1.4 cm; and diffusely lobe marrow signal concerning for marrow replacement process in the bones.   The patient underwent liver biopsy 7/08/22 with path showing HCC.   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    Past Medical History:   Past Medical History:   Diagnosis Date    Cirrhosis     GERD (gastroesophageal reflux disease)     HTN (hypertension)        Past Surgical HIstory:   Past Surgical History:   Procedure Laterality Date    MANDIBLE FRACTURE SURGERY         Family History:   Family History   Problem Relation Age of Onset    Lung cancer Mother     Cervical cancer Mother     Bladder Cancer Sister     Breast cancer Sister        Social History:  reports that he quit smoking about 17 months ago. His smoking use included cigarettes. He has a 20.00 pack-year smoking history. He has quit using smokeless tobacco.  His smokeless tobacco use included chew. He reports current drug use. He reports that he does not drink alcohol.    Allergies:  Review of patient's allergies indicates:  No Known Allergies    Medications:  Current Outpatient Medications   Medication Sig Dispense Refill     albuterol (PROVENTIL/VENTOLIN HFA) 90 mcg/actuation inhaler Inhale 2 puffs into the lungs every 4 (four) hours as needed for Shortness of Breath. 18 g 6    lactulose (CHRONULAC) 20 gram/30 mL Soln Take 15 mLs (10 g total) by mouth 3 (three) times daily. 1350 mL 0    ondansetron (ZOFRAN-ODT) 8 MG TbDL Take 1 tablet (8 mg total) by mouth every 8 (eight) hours as needed (nausea). 40 tablet 3    amLODIPine (NORVASC) 5 MG tablet Take 5 mg by mouth every morning.      loperamide (IMODIUM) 2 mg capsule Take 1 capsule (2 mg total) by mouth 4 (four) times daily as needed for Diarrhea. (Patient not taking: Reported on 11/29/2022) 30 capsule 2    oxyCODONE (ROXICODONE) 10 mg Tab immediate release tablet Take 1 tablet (10 mg total) by mouth every 4 (four) hours as needed for Pain. (Patient not taking: Reported on 11/29/2022) 60 tablet 0    SORAfenib (NEXAVAR) 200 mg tablet Take 2 tablets (400 mg total) by mouth 2 (two) times daily. 60 tablet 6    traZODone (DESYREL) 50 MG tablet Take 50 mg by mouth nightly as needed for Insomnia.      zolpidem (AMBIEN) 5 MG Tab Take 1 tablet (5 mg total) by mouth nightly as needed. 10 tablet 0     No current facility-administered medications for this visit.       Review of Systems   Constitutional:  Negative for appetite change, chills, diaphoresis, fatigue, fever and unexpected weight change.   HENT:  Negative for mouth sores and nosebleeds.    Eyes:  Negative for visual disturbance.   Respiratory:  Negative for cough and shortness of breath.    Cardiovascular:  Negative for chest pain and palpitations.   Gastrointestinal:  Positive for abdominal pain and nausea. Negative for constipation, diarrhea and vomiting.   Genitourinary:  Negative for frequency.   Skin:  Negative for color change and rash.   Neurological:  Positive for tremors. Negative for headaches.   Hematological:  Negative for adenopathy. Does not bruise/bleed easily.   Psychiatric/Behavioral:  Positive for sleep disturbance. The  "patient is not nervous/anxious.      ECOG Performance Status: 1   Objective:      Vitals:   Vitals:    11/29/22 0932   BP: 98/70   BP Location: Left arm   Patient Position: Sitting   BP Method: Medium (Manual)   Pulse: 96   Temp: 96.6 °F (35.9 °C)   TempSrc: Temporal   SpO2: 98%   Weight: 69.9 kg (154 lb 1.6 oz)   Height: 5' 8" (1.727 m)       Physical Exam  Constitutional:       General: He is not in acute distress.     Appearance: He is well-developed. He is not diaphoretic.   HENT:      Head: Normocephalic and atraumatic.   Cardiovascular:      Rate and Rhythm: Normal rate and regular rhythm.      Heart sounds: Normal heart sounds. No murmur heard.    No friction rub. No gallop.   Pulmonary:      Effort: Pulmonary effort is normal. No respiratory distress.      Breath sounds: No wheezing, rhonchi or rales.   Chest:      Chest wall: No tenderness.   Abdominal:      General: Bowel sounds are normal. There is no distension.      Palpations: Abdomen is soft. There is no mass.      Tenderness: There is no abdominal tenderness. There is no rebound.   Musculoskeletal:      Cervical back: Normal range of motion.   Lymphadenopathy:      Cervical: No cervical adenopathy.      Upper Body:      Right upper body: No supraclavicular or axillary adenopathy.      Left upper body: No supraclavicular or axillary adenopathy.   Skin:     General: Skin is warm and dry.      Findings: No erythema or rash.   Neurological:      Mental Status: He is alert and oriented to person, place, and time.      Comments: No asterixis appreciated.   Psychiatric:         Behavior: Behavior normal.       Laboratory Data:  Lab Visit on 11/29/2022   Component Date Value Ref Range Status    WBC 11/29/2022 6.30  3.90 - 12.70 K/uL Final    RBC 11/29/2022 4.13 (L)  4.60 - 6.20 M/uL Final    Hemoglobin 11/29/2022 12.3 (L)  14.0 - 18.0 g/dL Final    Hematocrit 11/29/2022 37.5 (L)  40.0 - 54.0 % Final    MCV 11/29/2022 91  82 - 98 fL Final    MCH 11/29/2022 " 29.8  27.0 - 31.0 pg Final    MCHC 11/29/2022 32.8  32.0 - 36.0 g/dL Final    RDW 11/29/2022 16.3 (H)  11.5 - 14.5 % Final    Platelets 11/29/2022 85 (L)  150 - 450 K/uL Final    MPV 11/29/2022 10.2  9.2 - 12.9 fL Final    Immature Granulocytes 11/29/2022 0.5  0.0 - 0.5 % Final    Gran # (ANC) 11/29/2022 3.5  1.8 - 7.7 K/uL Final    Immature Grans (Abs) 11/29/2022 0.03  0.00 - 0.04 K/uL Final    Comment: Mild elevation in immature granulocytes is non specific and   can be seen in a variety of conditions including stress response,   acute inflammation, trauma and pregnancy. Correlation with other   laboratory and clinical findings is essential.      Lymph # 11/29/2022 1.7  1.0 - 4.8 K/uL Final    Mono # 11/29/2022 0.9  0.3 - 1.0 K/uL Final    Eos # 11/29/2022 0.1  0.0 - 0.5 K/uL Final    Baso # 11/29/2022 0.05  0.00 - 0.20 K/uL Final    nRBC 11/29/2022 0  0 /100 WBC Final    Gran % 11/29/2022 55.7  38.0 - 73.0 % Final    Lymph % 11/29/2022 27.1  18.0 - 48.0 % Final    Mono % 11/29/2022 14.0  4.0 - 15.0 % Final    Eosinophil % 11/29/2022 1.9  0.0 - 8.0 % Final    Basophil % 11/29/2022 0.8  0.0 - 1.9 % Final    Differential Method 11/29/2022 Automated   Final    Sodium 11/29/2022 135 (L)  136 - 145 mmol/L Final    Potassium 11/29/2022 4.2  3.5 - 5.1 mmol/L Final    Chloride 11/29/2022 107  95 - 110 mmol/L Final    CO2 11/29/2022 20 (L)  23 - 29 mmol/L Final    Glucose 11/29/2022 78  70 - 110 mg/dL Final    BUN 11/29/2022 17  8 - 23 mg/dL Final    Creatinine 11/29/2022 1.3  0.5 - 1.4 mg/dL Final    Calcium 11/29/2022 9.0  8.7 - 10.5 mg/dL Final    Total Protein 11/29/2022 8.8 (H)  6.0 - 8.4 g/dL Final    Albumin 11/29/2022 2.8 (L)  3.5 - 5.2 g/dL Final    Total Bilirubin 11/29/2022 3.5 (H)  0.1 - 1.0 mg/dL Final    Comment: For infants and newborns, interpretation of results should be based  on gestational age, weight and in agreement with clinical  observations.    Premature Infant recommended reference ranges:  Up to  24 hours.............<8.0 mg/dL  Up to 48 hours............<12.0 mg/dL  3-5 days..................<15.0 mg/dL  6-29 days.................<15.0 mg/dL      Alkaline Phosphatase 11/29/2022 192 (H)  55 - 135 U/L Final    AST 11/29/2022 143 (H)  10 - 40 U/L Final    ALT 11/29/2022 64 (H)  10 - 44 U/L Final    Anion Gap 11/29/2022 8  8 - 16 mmol/L Final    eGFR 11/29/2022 >60.0  >60 mL/min/1.73 m^2 Final         Imaging:     CT C/A/P 11/18/22  Heterogeneous thyroid.  Heart size is normal.  Coronary artery calcification.  The thoracic aorta and main artery are normal in caliber.     Known tumor thrombus within the right atrium is not as well evaluated on this study due to contrast bolus timing although does not appear significantly changed.  Partial filling defect with SVC, discontinuous from the right atrial thrombus likely due to mixing artifact.     No enlarged axillary, mediastinal or hilar lymph nodes.     The central airways are clear.  Peripheral honeycombing worse at the upper lobes, similar to prior exams.  No focal consolidation, pleural effusion or pneumothorax.     Right hepatic lobe mass measures 6.0 x 6.4 cm (series 3, image 57) previously 5.7 x 5.8 cm.  Contrast bolus timing limits evaluation for new hepatic mass although no definite new mass is seen.  Additionally due to contrast bolus timing the extent of the tumor thrombus is difficult to measure although appears not significantly changed compared to prior exam measuring 12 cm in craniocaudal extent (series 601, image 74).  Evaluation of the IVC inferior to the liver and patency of the portal vein limited by contrast bolus timing.     Cholelithiasis.  No intra or extrahepatic biliary ductal dilatation.  The adrenal glands, spleen and pancreas are normal.  The kidneys enhance symmetrically without hydronephrosis.     Soft tissue attenuation extending from the re hepatis and surrounding the pancreatic head, similar to prior exam.  Stable enlarged re  hepatis lymph node measuring 2.5 cm (series 3, image 60).     Paraesophageal varices.  Abdominal aorta is normal in caliber with moderate atherosclerotic calcification.  IVC filter in place.     No bowel obstruction or inflammatory change.  Normal appendix.  Mild mesenteric edema.  Bladder is decompressed.  Prostate size is normal.  No enlarged pelvic lymph nodes.  No free fluid or free air.     Bilateral gynecomastia.  Stable nonspecific 10 mm lucency within the left iliac bone (series 3, image 123).  Otherwise, no acute or aggressive osseous abnormality.        Assessment:       1. HCC (hepatocellular carcinoma)    2. Hepatic encephalopathy    3. Hepatitis C virus infection without hepatic coma, unspecified chronicity    4. Normocytic anemia    5. Thrombocytopenia    6. Hypertension, unspecified type    7. Cancer related pain    8. Insomnia, unspecified type         Plan:     HCC - The patient has HCC with a large liver mass as well as an IVC mass extending into the right atrium  -Pt following with palliative care  -Pt completed cycle 6 of pembrolizumab on 11/11/22  -Repeat staging scans on 11/18/22 showed continued increase in right hepatic mass concerning for progression  -Child Suresh score from labs 11/10/22 showed Child Suresh Score of B7  -Will change treatment to Sorafenib 400mg BID  -PT to undergo chemo teaching  -Will repeat labs today     Hepatic Encephalopathy - pt no longer with asterixis  -Improved  -Ammonia on 11/18/22 normal  -Pt not taking lactulose regularly given diarrhea  -Will monitor    Hepatitis C - Pt currently being managed by Dr David Bains in [facility] ([phone])  -PT not currently on treatment  -Will monitor    Anemia of Chronic Disease - Iron studies from 6/28/22 concerning for anemia of chrocic disease  -Hemoglobin is 12.3g/dL on 11/29/22  -Will montor    Thrombocytopenia - likely due to liver disease  -platelets are 85k on 11/29/22  -Prior peripheral smear showed no significant  findings    HTN - pt on amlodipine  -Controlled   -Will monitor    Cancer Related Pain - Pt not taking oxycodone regularly  -Pt instructed to start taking oxycodone when he feels he needs it   -Will monitor    Insomnia - will d/c trazodone and try ambien    Advance Care Planning     Date: 07/14/2022    Power of   I initiated the process of advance care planning today and explained the importance of this process to the patient.  I introduced the concept of advance directives to the patient, as well. Then the patient received detailed information about the importance of designating a Health Care Power of  (HCPOA). He was also instructed to communicate with this person about their wishes for future healthcare, should he become sick and lose decision-making capacity. The patient has previously appointed a HCPOA. After our discussion, the patient has decided to complete a HCPOA and has appointed health care agent:  Shikha Dias  & health care agent number:  [phone]  .         Route Chart for Scheduling    Med Onc Chart Routing      Follow up with physician Other. Pt will not get treatment on 12/02/22 with pembrolizumab.  The patient needs labs today with TSH, CBC, CMP, AFP and INR.  PT needs a chemo class on 12/02/22 for Sorafenib.  Pt needs an appt with me a weeka fter he has started on Sorafenib with TSH, CBC, CMP, AFP and INR prior.   Follow up with ARLYN    Infusion scheduling note    Injection scheduling note    Labs    Imaging    Pharmacy appointment    Other referrals               Srinivas Coronel MD  [facility]  Hematology and Oncology  [facility]   [address]   [place] [id number]   O: [phone]  F: [phone]

## 2022-11-29 NOTE — TELEPHONE ENCOUNTER
SW received message from Dr. Coronel regarding patient needing assistance with Ensure. Gillian PINA let RD know of above. Pt has been set up by this RD with [facility] in the past and was receiving deliveries.     RD called patient's sister, Shikha, who states they were receiving but it seems to have stopped because it has been awhile and the patient is out. RD let Shikha know she would call [facility] to find out what is going.     MELANIE LVM with [facility], awaiting return call.     Brianne López 11/29/2022

## 2022-11-30 NOTE — TELEPHONE ENCOUNTER
PA came back as not needed, just a ADOLFO needed. Approved. Case ID# [id number]. Approved until 2/28/23. Estimated copay is $0. Forwarding for initial consult.

## 2022-11-30 NOTE — TELEPHONE ENCOUNTER
RD called Shikha, patient's sister, to discuss concerns with patient not receiving Ensure delivery. Shikha did not answer but RD able to LVM. This RD was able to reach out to [facility] who states they are short staffed and behind on deliveries. The care coordinator this RD spoke to was Lilian. Lilian states his care coordinator is Eda and she will have her reach out to patient today. This RD let [facility] know patient was out and it is really important for him to have.     RD provided call back number for [facility] in .     Brianne López 11/30/2022   9:19 AM

## 2022-12-02 NOTE — PROGRESS NOTES
Subjective:      Name: Irving Linn  : 1960  MRN: [id number]    Diagnosis:   1. HCC (hepatocellular carcinoma)    2. Hepatitis C virus infection without hepatic coma, unspecified chronicity    3. Normocytic anemia    4. Thrombocytopenia    5. Hypertension, unspecified type      CC:  Education    HPI:   Irving Linn is a 62 y.o. male who presented with Cirrhosis, GERD, HTN, Hepatitis C who presents for education for HCC with his brother in law.    Prior History:   The patient initially presented to the ED on 22 with complaint of SOB.    US of the LE 22 showed a nonocclusive thrombus in the distal superficial femoral vein on the left.    CTA C/A/P on 22 showed a mass in the proximal IVC extending into the right atrium highly suspicious for malignancy and a rounded lesion in the inferior right lobe of the liver.    US of the abdomen on 22 showed a 4.8 x 4.6 for 5.2 cm heterogeneous lesion in the right hepatic lobe; echogenic lesion within the IVC measuring 4.9 x 3.3 x 4.2 cm.    MRI of the abdomen and pelvis 22 showed large heterogeneously enhancing mass within the IVC in standing and the right atrium measuring 7.6 x 5.9 x 4 cm with occlusion of the right hepatic vein; hypoenhancing mass within the inferior right hepatic lobe measuring 5.6 x 5.1 cm; several mildly enlarged re hepatic lymph nodes measuring 1.4 cm; and diffusely lobe marrow signal concerning for marrow replacement process in the bones.    22 - 22:  Keytruda, progression    Oncology History   HCC (hepatocellular carcinoma)   2022 Initial Diagnosis    HCC (hepatocellular carcinoma)     2022 - 2022 Chemotherapy    Treatment Summary   Plan Name: OP PEMBROLIZUMAB 200MG Q3W  Treatment Goal: Palliative  Status: Inactive  Start Date: 2022  End Date: 2022  Provider: Srinivas Coronel MD  Chemotherapy: [No matching medication found in this treatment plan]        Past Medical History:  "  Diagnosis Date    Cirrhosis     GERD (gastroesophageal reflux disease)     HTN (hypertension)        Past Surgical History:   Procedure Laterality Date    MANDIBLE FRACTURE SURGERY         Family History   Problem Relation Age of Onset    Lung cancer Mother     Cervical cancer Mother     Bladder Cancer Sister     Breast cancer Sister        Social History     Socioeconomic History    Marital status: Single   Tobacco Use    Smoking status: Former     Packs/day: 1.00     Years: 20.00     Pack years: 20.00     Types: Cigarettes     Quit date: 2021     Years since quittin.4    Smokeless tobacco: Former     Types: Chew   Substance and Sexual Activity    Alcohol use: No    Drug use: Yes       Review of patient's allergies indicates:  No Known Allergies    Review of Systems   Constitutional: Negative for fever.   Cardiovascular:  Negative for chest pain.   Respiratory:  Negative for cough.    All other systems reviewed and are negative.         Objective:     Vitals:    22 0857   BP: 98/70   BP Location: Right arm   Patient Position: Sitting   BP Method: Medium (Manual)   Pulse: 80   Resp: 18   Temp: 97.4 °F (36.3 °C)   TempSrc: Temporal   SpO2: 96%   Weight: 72.4 kg (159 lb 9.8 oz)   Height: 5' 8" (1.727 m)        Physical Exam  Vitals reviewed.   Constitutional:       General: He is not in acute distress.  HENT:      Head: Normocephalic.   Pulmonary:      Effort: Pulmonary effort is normal.   Musculoskeletal:         General: Normal range of motion.   Neurological:      Mental Status: He is alert and oriented to person, place, and time.   Psychiatric:         Behavior: Behavior normal.         Thought Content: Thought content normal.           Current Outpatient Medications on File Prior to Visit   Medication Sig    albuterol (PROVENTIL/VENTOLIN HFA) 90 mcg/actuation inhaler Inhale 2 puffs into the lungs every 4 (four) hours as needed for Shortness of Breath.    amLODIPine (NORVASC) 5 MG tablet Take 5 mg " by mouth every morning.    lactulose (CHRONULAC) 10 gram/15 mL solution SMARTSIG:15 Milliliter(s) By Mouth 3 Times Daily    lactulose (CHRONULAC) 20 gram/30 mL Soln Take 15 mLs (10 g total) by mouth 3 (three) times daily.    ondansetron (ZOFRAN-ODT) 8 MG TbDL Take 1 tablet (8 mg total) by mouth every 8 (eight) hours as needed (nausea).    oxyCODONE (ROXICODONE) 10 mg Tab immediate release tablet Take 1 tablet (10 mg total) by mouth every 4 (four) hours as needed for Pain.    SORAfenib (NEXAVAR) 200 mg tablet Take 2 tablets (400 mg total) by mouth 2 (two) times daily.    traZODone (DESYREL) 50 MG tablet Take 50 mg by mouth nightly as needed for Insomnia.    zolpidem (AMBIEN) 5 MG Tab Take 1 tablet (5 mg total) by mouth nightly as needed.    loperamide (IMODIUM) 2 mg capsule Take 1 capsule (2 mg total) by mouth 4 (four) times daily as needed for Diarrhea. (Patient not taking: Reported on 11/29/2022)     No current facility-administered medications on file prior to visit.     CT Chest Abdomen Pelvis With Contrast (xpd)  Narrative: EXAMINATION:  CT CHEST ABDOMEN PELVIS WITH CONTRAST (XPD)    CLINICAL HISTORY:  Metastatic disease evaluation; Liver cell carcinoma    TECHNIQUE:  Low dose axial images, sagittal and coronal reformations were obtained from the thoracic inlet to the pubic symphysis following the IV administration of 75 mL of Omnipaque 350 and the oral administration of 900 mL of Readi-Cat.    COMPARISON:  Multiple priors, most recent 09/01/2022    FINDINGS:  Heterogeneous thyroid.  Heart size is normal.  Coronary artery calcification.  The thoracic aorta and main artery are normal in caliber.    Known tumor thrombus within the right atrium is not as well evaluated on this study due to contrast bolus timing although does not appear significantly changed.  Partial filling defect with SVC, discontinuous from the right atrial thrombus likely due to mixing artifact.    No enlarged axillary, mediastinal or hilar  lymph nodes.    The central airways are clear.  Peripheral honeycombing worse at the upper lobes, similar to prior exams.  No focal consolidation, pleural effusion or pneumothorax.    Right hepatic lobe mass measures 6.0 x 6.4 cm (series 3, image 57) previously 5.7 x 5.8 cm.  Contrast bolus timing limits evaluation for new hepatic mass although no definite new mass is seen.  Additionally due to contrast bolus timing the extent of the tumor thrombus is difficult to measure although appears not significantly changed compared to prior exam measuring 12 cm in craniocaudal extent (series 601, image 74).  Evaluation of the IVC inferior to the liver and patency of the portal vein limited by contrast bolus timing.    Cholelithiasis.  No intra or extrahepatic biliary ductal dilatation.  The adrenal glands, spleen and pancreas are normal.  The kidneys enhance symmetrically without hydronephrosis.    Soft tissue attenuation extending from the re hepatis and surrounding the pancreatic head, similar to prior exam.  Stable enlarged re hepatis lymph node measuring 2.5 cm (series 3, image 60).    Paraesophageal varices.  Abdominal aorta is normal in caliber with moderate atherosclerotic calcification.  IVC filter in place.    No bowel obstruction or inflammatory change.  Normal appendix.  Mild mesenteric edema.  Bladder is decompressed.  Prostate size is normal.  No enlarged pelvic lymph nodes.  No free fluid or free air.    Bilateral gynecomastia.  Stable nonspecific 10 mm lucency within the left iliac bone (series 3, image 123).  Otherwise, no acute or aggressive osseous abnormality.  Impression: Slightly increased size of right hepatic lobe mass measuring 6.0 x 6.4 cm, previously 5.7 x 5.8 cm.    Evaluation the vasculature is limited by contrast bolus timing likely abnormal due to tumor thrombus.  However, within the confines of this limitations, the tumor thrombus extending from the intrahepatic IVC into the right atrium  appears similar in extent compared to prior exam measuring 12 cm in craniocaudally.  There is partial filling defect within the SVC although this is favored to be due to mixing artifact as it is discontinuous from the right atrial thrombus.  If there is clinical concern, a follow up CT chest with contrast or echocardiogram could be performed.    Unchanged soft tissue within the re hepatis, concerning for conglomerate/matted lymph nodes.  Unchanged re hepatis 2.5 cm lymph node.    Cholelithiasis.    Constellation of lung findings are consistent with UIP type pattern.    Electronically signed by: Trudi Marcos  Date:    11/18/2022  Time:    16:02       All pertinent labs and imaging reviewed.    Assessment:       1. HCC (hepatocellular carcinoma)         Plan:     HCC (hepatocellular carcinoma)  -     Ambulatory referral/consult to Chem[facility]  -     ONCOLOGY NAVIGATION REQUEST     1.  Treatment plan of Nexavar (Sorafenib) 400 mg oral bid discussed with patient & brother in law.  2.  Handouts provided from [employer] on Tyrosine kinase inhibitor agent.    3.  Discussed the mechanism of action, potential side effects of this treatment as well as ways to manage them at home.    4.  Dietary modifications reinforced.     5.  Discussed follow-up with the physician for toxicity monitoring throughout treatment.  (lab & OV 1 week after start)  6.  Consent signed & witnessed by brother-in-law.      Treatment/plan reviewed and approved by Dr. Coronel.

## 2022-12-02 NOTE — PROGRESS NOTES
Patient c/o of dizziness but denies any falls . States that his B/P medication has been held for the past week .  Chely

## 2022-12-12 NOTE — TELEPHONE ENCOUNTER
I called Ms. Dias and informed her the patient's bilirubin was too high to start on treatment.  I instructed her we would need to repeat his labs in order to see if patient is a candidate for treatment.  She stated the patient could undergo repeat labs and see me in clinic next week.  I instructed her we would have labs in the clinic appointment scheduled.  The patient expressed understanding.  All questions were answered to his satisfaction.    Srinivas Coronel MD  [facility]  Hematology and Oncology  [facility] [address] [place] [address]   O: [phone]  F: [phone]

## 2022-12-13 NOTE — TELEPHONE ENCOUNTER
----- Message from Srinivas Coronel MD sent at 12/12/2022  4:12 PM CST -----  Please have the patient undergo CBC, CMp and INr next week with a return appt with me.

## 2022-12-13 NOTE — TELEPHONE ENCOUNTER
"Called and spoke with pt's sister in regards to scheduling the pt for his labs and MD follow up. At this time Mrs. Trivedi is in a meeting and stated "I will call back to get him set up." I verbalized understanding.   "

## 2022-12-16 NOTE — TELEPHONE ENCOUNTER
----- Message from Emilia Flanagan sent at 12/16/2022  8:21 AM CST -----  Type: Need Medical Advice  Who Called: Shikha meier of patient  Best callback number:[phone]  Additional Info: Patients sister called to speak with a nurs about the above patients care  Please call to further assist, Thanks

## 2022-12-20 NOTE — TELEPHONE ENCOUNTER
Per MD, patient most likely going home on hospice. Will d/c Nexavar order and close out [facility] encounter